# Patient Record
Sex: FEMALE | Race: WHITE | NOT HISPANIC OR LATINO | Employment: UNEMPLOYED | URBAN - METROPOLITAN AREA
[De-identification: names, ages, dates, MRNs, and addresses within clinical notes are randomized per-mention and may not be internally consistent; named-entity substitution may affect disease eponyms.]

---

## 2017-01-30 ENCOUNTER — GENERIC CONVERSION - ENCOUNTER (OUTPATIENT)
Dept: OTHER | Facility: OTHER | Age: 52
End: 2017-01-30

## 2017-02-21 ENCOUNTER — GENERIC CONVERSION - ENCOUNTER (OUTPATIENT)
Dept: OTHER | Facility: OTHER | Age: 52
End: 2017-02-21

## 2017-05-03 ENCOUNTER — HOSPITAL ENCOUNTER (EMERGENCY)
Facility: HOSPITAL | Age: 52
Discharge: LEFT BEFORE DISCHARGE | End: 2017-05-03
Payer: COMMERCIAL

## 2017-05-03 VITALS
HEART RATE: 99 BPM | OXYGEN SATURATION: 94 % | TEMPERATURE: 97.8 F | RESPIRATION RATE: 18 BRPM | DIASTOLIC BLOOD PRESSURE: 75 MMHG | WEIGHT: 218 LBS | SYSTOLIC BLOOD PRESSURE: 175 MMHG

## 2017-05-03 RX ORDER — MONTELUKAST SODIUM 10 MG/1
10 TABLET ORAL
COMMUNITY
End: 2018-12-13 | Stop reason: SDUPTHER

## 2017-05-03 RX ORDER — SULFASALAZINE 500 MG/1
1000 TABLET ORAL 2 TIMES DAILY
Status: ON HOLD | COMMUNITY
End: 2018-01-03

## 2017-05-03 RX ORDER — ACETAMINOPHEN 325 MG/1
650 TABLET ORAL ONCE
Status: COMPLETED | OUTPATIENT
Start: 2017-05-03 | End: 2017-05-03

## 2017-05-03 RX ORDER — PREDNISONE 10 MG/1
5 TABLET ORAL 2 TIMES DAILY
COMMUNITY
End: 2018-04-18

## 2017-05-03 RX ADMIN — ACETAMINOPHEN 650 MG: 325 TABLET, FILM COATED ORAL at 12:58

## 2017-05-05 ENCOUNTER — ALLSCRIPTS OFFICE VISIT (OUTPATIENT)
Dept: OTHER | Facility: OTHER | Age: 52
End: 2017-05-05

## 2017-05-05 DIAGNOSIS — Z12.31 ENCOUNTER FOR SCREENING MAMMOGRAM FOR MALIGNANT NEOPLASM OF BREAST: ICD-10-CM

## 2017-07-26 ENCOUNTER — ALLSCRIPTS OFFICE VISIT (OUTPATIENT)
Dept: OTHER | Facility: OTHER | Age: 52
End: 2017-07-26

## 2017-07-26 DIAGNOSIS — M06.4 INFLAMMATORY POLYARTHROPATHY (HCC): ICD-10-CM

## 2017-07-26 DIAGNOSIS — R63.5 ABNORMAL WEIGHT GAIN: ICD-10-CM

## 2017-10-12 ENCOUNTER — GENERIC CONVERSION - ENCOUNTER (OUTPATIENT)
Dept: OTHER | Facility: OTHER | Age: 52
End: 2017-10-12

## 2017-10-26 ENCOUNTER — TRANSCRIBE ORDERS (OUTPATIENT)
Dept: ADMINISTRATIVE | Facility: HOSPITAL | Age: 52
End: 2017-10-26

## 2017-10-26 DIAGNOSIS — N64.4 MASTODYNIA: ICD-10-CM

## 2017-10-26 DIAGNOSIS — R10.11 RIGHT UPPER QUADRANT PAIN: ICD-10-CM

## 2017-10-26 DIAGNOSIS — Z98.84 BARIATRIC SURGERY STATUS: Primary | ICD-10-CM

## 2017-10-26 DIAGNOSIS — Z98.84 BARIATRIC SURGERY STATUS: ICD-10-CM

## 2017-10-26 DIAGNOSIS — M89.8X1 OTHER SPECIFIED DISORDERS OF BONE, SHOULDER: ICD-10-CM

## 2017-10-30 ENCOUNTER — HOSPITAL ENCOUNTER (OUTPATIENT)
Dept: RADIOLOGY | Facility: HOSPITAL | Age: 52
Discharge: HOME | End: 2017-10-30
Attending: SURGERY
Payer: COMMERCIAL

## 2017-10-30 DIAGNOSIS — Z98.84 BARIATRIC SURGERY STATUS: ICD-10-CM

## 2017-10-30 PROCEDURE — 74240 X-RAY XM UPR GI TRC 1CNTRST: CPT

## 2017-11-15 ENCOUNTER — ALLSCRIPTS OFFICE VISIT (OUTPATIENT)
Dept: OTHER | Facility: OTHER | Age: 52
End: 2017-11-15

## 2017-11-16 NOTE — PROGRESS NOTES
Assessment    1  Right upper quadrant abdominal pain (789 01) (R10 11)   2  Mastalgia (611 71) (N64 4)   3  Acute bronchitis (466 0) (J20 9)   4  Clavicle pain (733 90) (M89 8X1)    Plan  Acute bronchitis    · Benzonatate 200 MG Oral Capsule; TAKE 1 CAPSULE 3 TIMES DAILY ASNEEDED  Acute bronchitis, Right upper quadrant abdominal pain    · Azithromycin 250 MG Oral Tablet; TAKE 2 TABLETS ON DAY 1 THEN TAKE 1TABLET A DAY FOR 4 DAYS   · Ipratropium-Albuterol 0 5-2 5 (3) MG/3ML Inhalation Solution; 1 unit dose qid prn  Clavicle pain    · XR CLAVICLE RIGHT; Status:Active; Requested for:04Nxe8772;   Encounter for colorectal cancer screening    · *1 - [facility]  had no showed for appt10/12 due to school issues with son-is homeschooling son now  Status: Active Requested for: 70WCS6090  Care Summary provided  : Yes  Mastalgia    · *US BREAST LEFT LIMITED (DIAGNOSTIC); Status:Hold For - Scheduling; Requestedfor:26Njs3088;    · *US BREAST RIGHT LIMITED (DIAGNOSTIC); Status:Hold For - Scheduling; Requestedfor:82Ucn4807;    · MAMMO DIAGNOSTIC BILATERAL W CAD; Status:Hold For - Scheduling; Requestedfor:92Gxx0546;   Right upper quadrant abdominal pain    · * US ABDOMEN COMPLETE; Status:Hold For - Scheduling; Requested for:09Wnz0898;     Discussion/Summary    Await xray ,ultrasound and mammo, see how pt does on meds  Possible side effects of new medications were reviewed with the patient/guardian today  The treatment plan was reviewed with the patient/guardian  The patient/guardian understands and agrees with the treatment plan      Chief Complaint  Pt here cough with phlegm, sinus pressure, headache, sore throat  Pt also collar bone right side pain  Pt states hurts to touch  Pt also states pain under breast, vomiting, heartburn and occasional diarrhea  Pt states gallbladder issue, pain and pressure        History of Present Illness  Cold Symptoms:   Phoebe Si presents with complaints of sudden onset of constant episodes of moderate cold symptoms  Her symptoms are caused by no known event Symptoms are worsening (using old nebulizer meds)  Associated symptoms include nasal congestion,-- runny nose,-- headache-- and-- wheezing  The patient presents with complaints of productive cough (yellow mucus)   Abdominal Pain:   [facility] presents with complaints of gradual onset of constant episodes of moderate right upper quadrant abdominal pain, described as aching  Symptoms are made worse by eating and drinking  Review of Systems   Constitutional: fever-- and-- feeling poorly, but-- no chills-- and-- not feeling tired  ENT: no earache-- and-- no sore throat  Cardiovascular: no chest pain  Respiratory: cough-- and-- wheezing  Breasts: breast pain  Gastrointestinal: abdominal pain  Musculoskeletal: limb pain-- and-- clavicle pain  Active Problems  1  Acute foot pain, right (729 5) (M79 671)   2  Acute pain of right knee (719 46) (M25 561)   3  Cough (786 2) (R05)   4  Degeneration of lumbar or lumbosacral intervertebral disc (722 52) (M51 37)   5  Depression (311) (F32 9)   6  Dermatitis fungal (111 9) (B36 9)   7  Edema (782 3) (R60 9)   8  Encounter for colorectal cancer screening (V76 51) (Z12 11,Z12 12)   9  Encounter for screening mammogram for breast cancer (V76 12) (Z12 31)   10  Flu vaccine need (V04 81) (Z23)   11  Foot injury (959 7) (S99 929A)   12  Hip pain (719 45) (M25 559)   13  Hypertension (401 9) (I10)   14  Knee pain (719 46) (M25 569)   15  Left foot pain (729 5) (M79 672)   16  Left knee pain (719 46) (M25 562)   17  Left shoulder pain (719 41) (M25 512)   18  Mild persistent asthma without complication (250 53) (C71 58)   19  Nausea Chronic (787 02)   20  Obesity (278 00) (E66 9)   21  Obesity surgery status (V45 86) (Z98 84)   22  Omphalitis (771 4) (P38 9)   23  Pain of left upper extremity (729 5) (M79 602)   24  Photosensitivity due to sun (661 50) (K96 8)   76  Post-concussion syndrome (310 2) (F07 81)   26  Primary localized osteoarthritis of left knee (715 16) (M17 12)   27  Seronegative erosive rheumatoid arthritis (714 89) (M06 4)   28  Sleep apnea (780 57)   29  Status post bariatric surgery (V45 86) (Z98 84)   30  Stuttering (315 35) (F80 81)   31  Toxic effect of tobacco (989 84,E980 9) (T65 291A)   32  Vomiting (787 03) (R11 10)   33  Weight gain (783 1) (R63 5)    Past Medical History  1  History of bronchitis (V12 69) (Z87 09)  Active Problems And Past Medical History Reviewed: The active problems and past medical history were reviewed and updated today  Family History  Mother    1  Family history of Congenital Heart Disease   2  Family history of Diabetes Mellitus (V18 0)   3  Family history of Stroke Syndrome (V17 1)  Father    4  Family history of Diabetes Mellitus (V18 0)   5  Family history of asthma (V17 5) (Z82 5)   6  Family history of Hypertension (V17 49)  Son    7  Family history of seizures (V19 8) (Z84 89)  Sister    6  Family history of asthma (V17 5) (Z82 5)  Brother    5  Family history of asthma (V17 5) (Z82 5)  Maternal Aunt    10  Family history of asthma (V17 5) (Z82 5)  Family History Reviewed: The family history was reviewed and updated today  Social History     · Advance directive information unavailable   · Current every day smoker (305 1) (F17 200)   · 1 ppd   · Domestic partner   · Denied: History of Drug Use   · Denied: History of domestic violence   · Housing Details: House   · Lives with domestic partner   · One child   · Stopped Drinking Alcohol   · Unemployed, looking for work  The social history was reviewed and updated today  Surgical History    1  History of Anal Fissurectomy   2  History of  Section   3  History of Laparosc Restrictive Proc Adjustable Gastric Band Placement  Surgical History Reviewed: The surgical history was reviewed and updated today  Current Meds   1   Acetaminophen-Codeine 300-60 MG Oral Tablet; 1 PO TID prn pain; Therapy: 19MDD6534 to (Evaluate:04Jun2017); Last YW:56RPI4526 Ordered   2  BuPROPion HCl ER (SR) 150 MG Oral Tablet Extended Release 12 Hour; TAKE 1 TABLET EVERY 12 HOURS DAILY; Therapy: 11YQN9752 to (Evaluate:28Apr2018)  Requested for: 96Fvm7089; Last Rx:61Vzy0319 Ordered   3  Fluticasone Propionate 50 MCG/ACT Nasal Suspension; USE 1 SPRAY IN EACH NOSTRIL ONCE DAILY; Therapy: 60Yhh6969 to (Evaluate:15May2016)  Requested for: 15Apr2016; Last Rx:15Apr2016 Ordered   4  Levalbuterol HCl - 1 25 MG/3ML Inhalation Nebulization Solution; USE 1 UNIT DOSE IN NEBULIZER EVERY 4 TO 6 HOURS AS NEEDED  Requested for: 57AMU3903; Last Rx:01Nov2016 Ordered   5  Losartan Potassium-HCTZ 100-25 MG Oral Tablet; Take 1 tablet daily; Therapy: 61AGJ9721 to (Evaluate:22Nov2017)  Requested for: 97INL8491; Last Rx:76Pft6502 Ordered   6  Montelukast Sodium 10 MG Oral Tablet; take 1 tablet by mouth once daily  Requested for: 04NBH4320; Last Rx:67Ced7575 Ordered   7  Oxaprozin 600 MG Oral Tablet; take 2 tablets daily; Therapy: 57DGU4340 to (Evaluate:31Gsr9722)  Requested for: 23ELI9394; Last Rx:20Oct2017 Ordered   8  Spiriva Respimat 1 25 MCG/ACT Inhalation Aerosol Solution; INHALE 2 INHALATIONS BY MOUTH DAILY; Therapy: 53HZV4862 to (Evaluate:22Jan2018)  Requested for: 54Iwf1619; Last Rx:58Rao4458 Ordered   9  Spironolactone 25 MG Oral Tablet; 1 po day prn edema; Therapy: 70RBR3154 to (Evaluate:22Jan2018)  Requested for: 38Vrq4479; Last Rx:98Sow6691 Ordered   10  Symbicort 160-4 5 MCG/ACT Inhalation Aerosol; INHALE 2 PUFFS TWICE DAILY  RINSE MOUTH AFTER USE; Therapy: 20Vte2757 to (Evaluate:15Oct2016)  Requested for: 69Adm1793; Last  Rx:00Xry2719 Ordered   11  Ventolin  (90 Base) MCG/ACT Inhalation Aerosol Solution; INHALE 2 PUFFS  FOUR TIMES DAILY AS DIRECTED;   Therapy: 02Sep2016 to (Tu Gastelum)  Requested for: 36LDA8721; Last  Rx:46Epd1296 Ordered    The medication list was reviewed and updated today  Allergies  1  No Known Drug Allergies    Vitals   Recorded: 45BIF1077 02:36PM   Temperature 98 8 F, Temporal   Heart Rate 92   Respiration 18   Systolic 621   Diastolic 928   Height 5 ft    Weight 214 lb 8 oz   BMI Calculated 41 89   BSA Calculated 1 92   Pain Scale 10       Physical Exam   Constitutional  General appearance: Abnormal   acutely ill,-- uncomfortable-- and-- obese  Results/Data  PHQ-9 Adult Depression Screening 61SGJ9409 02:41PM User, Ahs     Test Name Result Flag Reference   PHQ-9 Adult Depression Score 19       Over the last two weeks, how often have you been bothered by any of the following problems? Little interest or pleasure in doing things: Nearly every day - 3 Feeling down, depressed, or hopeless: More than half the days - 2 Trouble falling or staying asleep, or sleeping too much: Nearly every day - 3 Feeling tired or having little energy: Nearly every day - 3 Poor appetite or over eating: Nearly every day - 3 Feeling bad about yourself - or that you are a failure or have let yourself or your family down: Nearly every day - 3 Trouble concentrating on things, such as reading the newspaper or watching television: More than half the days - 2 Moving or speaking so slowly that other people could have noticed  Or the opposite -  being so fidgety or restless that you have been moving around a lot more than usual: Not at all - 0 Thoughts that you would be better off dead, or of hurting yourself in some way: Not at all - 0   PHQ-9 Adult Depression Screening Positive     PHQ-9 Difficulty Level Very difficult     PHQ-9 Severity      Moderately Severe Depression       Future Appointments    Date/Time Provider Specialty Site   12/21/2017 10:30 AM CHERELLE Morrissey  General Surgery [facility]       Signatures   Electronically signed by :  Ambar Robertson MD; Nov 15 2017  3:08PM EST                       (Author)

## 2017-11-17 ENCOUNTER — HOSPITAL ENCOUNTER (OUTPATIENT)
Dept: RADIOLOGY | Age: 52
Discharge: HOME | End: 2017-11-17
Payer: COMMERCIAL

## 2017-11-17 DIAGNOSIS — R10.11 RIGHT UPPER QUADRANT PAIN: ICD-10-CM

## 2017-11-17 PROCEDURE — 76700 US EXAM ABDOM COMPLETE: CPT

## 2017-11-20 ENCOUNTER — HOSPITAL ENCOUNTER (OUTPATIENT)
Dept: ULTRASOUND IMAGING | Facility: CLINIC | Age: 52
Discharge: HOME | End: 2017-11-20
Payer: COMMERCIAL

## 2017-11-20 ENCOUNTER — HOSPITAL ENCOUNTER (OUTPATIENT)
Dept: MAMMOGRAPHY | Facility: CLINIC | Age: 52
Discharge: HOME | End: 2017-11-20
Payer: COMMERCIAL

## 2017-11-20 DIAGNOSIS — N64.4 MASTODYNIA: ICD-10-CM

## 2017-11-20 DIAGNOSIS — Z12.39 ENCOUNTER FOR SCREENING FOR MALIGNANT NEOPLASM OF BREAST: ICD-10-CM

## 2017-11-20 PROCEDURE — 77063 BREAST TOMOSYNTHESIS BI: CPT

## 2017-11-20 PROCEDURE — G0202 SCR MAMMO BI INCL CAD: HCPCS

## 2017-11-22 ENCOUNTER — GENERIC CONVERSION - ENCOUNTER (OUTPATIENT)
Dept: OTHER | Facility: OTHER | Age: 52
End: 2017-11-22

## 2017-12-21 ENCOUNTER — GENERIC CONVERSION - ENCOUNTER (OUTPATIENT)
Dept: OTHER | Facility: OTHER | Age: 52
End: 2017-12-21

## 2017-12-28 RX ORDER — ALBUTEROL SULFATE 90 UG/1
2 AEROSOL, METERED RESPIRATORY (INHALATION) 4 TIMES DAILY
Status: ON HOLD | COMMUNITY
End: 2018-01-03 | Stop reason: ALTCHOICE

## 2017-12-28 RX ORDER — ACETAMINOPHEN AND CODEINE PHOSPHATE 300; 30 MG/1; MG/1
1 TABLET ORAL EVERY 4 HOURS PRN
Status: ON HOLD | COMMUNITY
End: 2018-01-03 | Stop reason: ALTCHOICE

## 2017-12-28 RX ORDER — FLUTICASONE PROPIONATE 50 MCG
1 SPRAY, SUSPENSION (ML) NASAL AS NEEDED
COMMUNITY

## 2017-12-28 RX ORDER — BUDESONIDE AND FORMOTEROL FUMARATE DIHYDRATE 160; 4.5 UG/1; UG/1
2 AEROSOL RESPIRATORY (INHALATION) 2 TIMES DAILY
COMMUNITY

## 2017-12-28 RX ORDER — BENZONATATE 200 MG/1
200 CAPSULE ORAL 3 TIMES DAILY PRN
Status: ON HOLD | COMMUNITY
End: 2018-01-03 | Stop reason: ALTCHOICE

## 2017-12-28 RX ORDER — SPIRONOLACTONE 25 MG/1
25 TABLET ORAL AS NEEDED
COMMUNITY
End: 2022-03-14 | Stop reason: ALTCHOICE

## 2017-12-28 RX ORDER — ALBUTEROL SULFATE 2.5 MG/3ML
2.5 SOLUTION RESPIRATORY (INHALATION) EVERY 6 HOURS PRN
COMMUNITY
End: 2018-10-29 | Stop reason: SDUPTHER

## 2018-01-02 ENCOUNTER — ANESTHESIA EVENT (OUTPATIENT)
Dept: GASTROENTEROLOGY | Facility: HOSPITAL | Age: 53
End: 2018-01-02
Payer: COMMERCIAL

## 2018-01-03 ENCOUNTER — ANESTHESIA (OUTPATIENT)
Dept: GASTROENTEROLOGY | Facility: HOSPITAL | Age: 53
End: 2018-01-03
Payer: COMMERCIAL

## 2018-01-03 ENCOUNTER — HOSPITAL ENCOUNTER (OUTPATIENT)
Facility: HOSPITAL | Age: 53
Setting detail: OUTPATIENT SURGERY
Discharge: HOME | End: 2018-01-03
Attending: SURGERY | Admitting: SURGERY
Payer: COMMERCIAL

## 2018-01-03 VITALS
SYSTOLIC BLOOD PRESSURE: 145 MMHG | WEIGHT: 211 LBS | HEIGHT: 61 IN | BODY MASS INDEX: 39.84 KG/M2 | TEMPERATURE: 97 F | DIASTOLIC BLOOD PRESSURE: 72 MMHG | RESPIRATION RATE: 16 BRPM | OXYGEN SATURATION: 98 % | HEART RATE: 68 BPM

## 2018-01-03 DIAGNOSIS — Z98.84 BARIATRIC SURGERY STATUS: ICD-10-CM

## 2018-01-03 PROCEDURE — 88342 IMHCHEM/IMCYTCHM 1ST ANTB: CPT | Performed by: SURGERY

## 2018-01-03 PROCEDURE — 88305 TISSUE EXAM BY PATHOLOGIST: CPT | Performed by: SURGERY

## 2018-01-03 RX ORDER — SODIUM CHLORIDE 9 MG/ML
125 INJECTION, SOLUTION INTRAVENOUS CONTINUOUS
Status: DISCONTINUED | OUTPATIENT
Start: 2018-01-03 | End: 2018-01-03 | Stop reason: HOSPADM

## 2018-01-03 RX ORDER — PROPOFOL 10 MG/ML
INJECTION, EMULSION INTRAVENOUS AS NEEDED
Status: DISCONTINUED | OUTPATIENT
Start: 2018-01-03 | End: 2018-01-03 | Stop reason: SURG

## 2018-01-03 RX ADMIN — SODIUM CHLORIDE 125 ML/HR: 0.9 INJECTION, SOLUTION INTRAVENOUS at 06:46

## 2018-01-03 RX ADMIN — PROPOFOL 50 MG: 10 INJECTION, EMULSION INTRAVENOUS at 07:53

## 2018-01-03 RX ADMIN — PROPOFOL 150 MG: 10 INJECTION, EMULSION INTRAVENOUS at 07:51

## 2018-01-03 NOTE — OP NOTE
OPERATIVE REPORT  PATIENT NAME: Alecia Alarcon    :  1965  MRN: [id number]  Pt Location: [facility] ROOM 01    SURGERY DATE: 1/3/2018    Surgeon(s) and Role:     * Prosper Jerez MD - Primary     * Silviano Olsen MD - Assistant    Preop Diagnosis:  Bariatric surgery status [Z98 84]    Post-Op Diagnosis Codes: * Bariatric surgery status [Z98 84]    Procedure(s) (LRB):  ESOPHAGOGASTRODUODENOSCOPY (EGD) with biopsy (N/A)    Specimen(s):  ID Type Source Tests Collected by Time Destination   1 : Gastric biopsy r/o h pylori Tissue Stomach TISSUE EXAM Prosper Jerez MD 1/3/2018 8153        Estimated Blood Loss:   Minimal    Drains:       Anesthesia Type:   IV Sedation with Anesthesia    Operative Indications:  Bariatric surgery status [Z98 84]      Operative Findings:  Hiatal hernia  Esophageal hiatus LA grade A  No band erosion     Complications:   None    Procedure and Technique:  Upper endoscopy and biopsy   I was present for the entire procedure    Patient Disposition:  hemodynamically stable             Indication:   Patient suffers from morbid obesity and associated co-morbidities  and failed to achieve any meaningful or sustainable weight loss  Patient presented for a bariatric procedure and was consented for a preoperative upper endoscopy and biopsy to rule out H  Pylori  Description of the procedure: The patient was brought to the endoscopy suite and was placed in  left lateral decubitus position  A time-out was called and the patient was identified by name and by armband  The patient received IV  Propofol under closed monitoring  by the anesthesiologist and nurse anesthesist     Upper endoscopy was performed under direct visualization     Esophagus was visualized and showed normal findings   The GE junction showed a hiatal hernia   The stomach was then inspected and showed normal findings    First and second portion of duodenum were inspected and appeared to be normal  Biopsy was taken with a punch biopsy forceps from the antrum and sent to pathology to rule out H  Pylori  Retroflex view of the GE junction was obtained and showed no evidence of band erosion      The patient tolerated the procedure well and was transferred to the recovery unit in stable condition           I    SIGNATURE: Francis Webb MD  DATE: January 3, 2018  TIME: 7:56 AM

## 2018-01-03 NOTE — ANESTHESIA PREPROCEDURE EVALUATION
Review of Systems/Medical History  Patient summary reviewed  Chart reviewed  No history of anesthetic complications     Cardiovascular  Hypertension controlled,    Pulmonary  Asthma: well controlled/ stable Last rescue: today Asthma type of rescue: PRN inhaler, Sleep apnea CPAP, ,        GI/Hepatic    Bariatric surgery,             Endo/Other  Arthritis     GYN  Negative gynecology ROS          Hematology  Negative hematology ROS      Musculoskeletal  Obesity  morbid obesity, Rheumatoid arthritis ,        Neurology  Negative neurology ROS      Psychology   Anxiety, Depression , being treated for depression,            Physical Exam    Airway    Mallampati score: II  TM Distance: >3 FB  Neck ROM: full     Dental   No notable dental hx     Cardiovascular  Rhythm: regular, Rate: normal, Cardiovascular exam normal    Pulmonary  Pulmonary exam normal Breath sounds clear to auscultation,     Other Findings        Anesthesia Plan  ASA Score- 3     Anesthesia Type- IV sedation with anesthesia with ASA Monitors  Additional Monitors:   Airway Plan:         Plan Factors-Patient not instructed to abstain from smoking on day of procedure  Patient did not smoke on day of surgery  Induction- intravenous  Postoperative Plan-     Informed Consent- Anesthetic plan and risks discussed with patient  I personally reviewed this patient with the CRNA  Discussed and agreed on the Anesthesia Plan with the CRNA  Brady Zarco

## 2018-01-03 NOTE — H&P
H&P EXAM - Outpatient Endoscopy  [address] GI LAB INTRA   Shereen Govea 47 y o  female MRN: [id number]  Unit/Bed#: ENDO POOL Encounter: [id number]        Impression: Morbid obesity s/p band placement    Plan:Upper endoscopy and a biopsy to rule out H  Pylori    Chief Complaint: Morbid obesity and preoperative endoscopy    Physical Exam: Normal not in acute distress   Chest: Clear to auscultation   Heart: Normal S1 and S2

## 2018-01-03 NOTE — DISCHARGE INSTRUCTIONS
Upper Endoscopy   WHAT YOU NEED TO KNOW:   An upper endoscopy is also called an upper gastrointestinal (GI) endoscopy, or an esophagogastroduodenoscopy (EGD)  You may feel bloated, gassy, or have some abdominal discomfort after your procedure  Your throat may be sore for 24 to 36 hours  You may burp or pass gas from air that is still inside your body  DISCHARGE INSTRUCTIONS:   Call 911 for any of the following:   · You have sudden chest pain or trouble breathing  Seek care immediately if:   · You feel dizzy or faint  · You have trouble swallowing  · Your bowel movements are very dark or black  · Your abdomen is hard and firm and you have severe pain  · You vomit blood  Contact your healthcare provider if:   · You feel full or bloated and cannot burp or pass gas  · You have not had a bowel movement for 3 days after your procedure  · You have neck pain  · You have a fever or chills  · You have nausea or are vomiting  · You have a rash or hives  · You have questions or concerns about your endoscopy  Relieve a sore throat:  Suck on throat lozenges or crushed ice  Gargle with a small amount of warm salt water  Mix 1 teaspoon of salt and 1 cup of warm water to make salt water  Relieve gas and discomfort from bloating:  Lie on your right side with a heating pad on your abdomen  Take short walks to help pass gas  Eat small meals until bloating is relieved  Rest after your procedure: You have been given medicine to relax you  Do not  drive or make important decisions until the day after your procedure  Return to your normal activity as directed  You can usually return to work the day after your procedure  Follow up with your healthcare provider as directed:  Write down your questions so you remember to ask them during your visits     © 2017 [address] is for End User's use only and may not be sold, redistributed or otherwise used for commercial purposes  All illustrations and images included in CareNotes® are the copyrighted property of [email or web address]  or Azeem Dutton  The above information is an  only  It is not intended as medical advice for individual conditions or treatments  Talk to your doctor, nurse or pharmacist before following any medical regimen to see if it is safe and effective for you  Esophagitis   WHAT YOU NEED TO KNOW:   What is esophagitis? Esophagitis is inflammation or irritation of the lining of the esophagus  What causes esophagitis? The most common cause is acid reflux  This means stomach acid backs up into your esophagus  The following can also cause esophagitis:  · An infection from bacteria, a virus, or a fungus    · Vomiting or a hiatal hernia    · Medicines such as aspirin or NSAIDs    · Large pills taken without enough water or right before you go to bed    · Cancer treatment, such as radiation    · A toxic object you swallowed, such as a button battery, that gets stuck in your esophagus    · Too much caffeine or acidic or spicy foods    · Cigarette smoking  What are the signs and symptoms of esophagitis? Signs and symptoms depend on the cause of your esophagitis  You may have any of the following:  · Pain in the middle of your chest that may spread to your back    · Burning or pain in your esophagus, abdominal pain, or indigestion    · Trouble swallowing, or pain when you swallow    · A feeling that something is stuck in your esophagus    · Sore throat, a cough, or hoarseness    · Gagging, drooling, or wheezing    · Mouth sores (white patches), or a bad taste in your mouth or bad breath    · Nausea or vomiting    · Feeding problems or failure to thrive (young children)  How is esophagitis diagnosed? Your healthcare provider will ask about your symptoms and when they started  Tell him if anything makes your symptoms worse or better   You may need any of the following:  · An endoscopy  is a procedure used to look at your esophagus and stomach  Your healthcare provider will use an endoscope (tube with a camera and light on the end)  He may also take a tissue sample during the procedure  The sample may show if your esophagus was damaged by what is causing your esophagitis  · A barium swallow  is done to show if your esophagus was damaged and how badly it was damaged  X-rays are taken after you swallow barium liquid  Barium liquid is used to help damage show up on the x-ray  How is esophagitis treated? The goal of treatment is to help the lining of your esophagus heal and to prevent serious complications  Treatment will depend on what is causing your esophagitis  Symptoms caused by a toxic object such as a button battery need immediate treatment  Less severe causes may not need treatment  You may need any of the following if symptoms continue or get worse:  · Medicines  may be given to fight infection or to control stomach acid  Your healthcare provider may make changes to your medicines, such as changing it to a liquid form  · An elimination diet  may help you find foods that are causing your symptoms  You will stop eating certain foods that can cause esophagitis  Your healthcare provider will tell you to start eating them again one at a time  Each time you do not have symptoms, you will start eating another food from the list  Any food that does cause symptoms may be causing your esophagitis  · Surgery  may be needed if other treatments do not work  Part of your stomach can be wrapped to cover the valve between your stomach and esophagus  This helps prevent acid from backing up into your esophagus  What can I do to manage or prevent esophagitis? · Do not smoke  Nicotine and other chemicals in cigarettes and cigars can cause blood vessel and lung damage  Ask your healthcare provider for information if you currently smoke and need help to quit   E-cigarettes or smokeless tobacco still contain nicotine  Talk to your healthcare provider before you use these products  · Do not drink alcohol  Alcohol can irritate your esophagus  Talk to your healthcare provider if you need help to stop drinking  · Limit or do not eat foods that can lead to esophagitis  Foods such as oranges and salsa can irritate your esophagus  Caffeine and chocolate can cause acid reflux  High-fat and fried foods make your stomach digest food more slowly  This increases the amount of stomach acid your esophagus is exposed to  Eat small meals, and drink water with your meals  Soft foods such as yogurt and applesauce may help soothe your throat  Do not eat for at least 3 hours before you go to bed  · Keep batteries and similar objects out of the reach of children  Babies often put items in their mouths to explore them  Button batteries are easy to swallow and can cause serious damage  Keep the battery covers of electronic devices such as remote controls taped closed  Store all batteries and toxic materials where children cannot get to them  Use childproof locks to keep children away from dangerous materials  · Drink more liquid when you take pills  Drink a full glass of water when you take your pills  Ask your healthcare provider if you can take your pills at least an hour before you go to bed  · Prevent acid reflux  Do not bend over unless it is necessary  Acid may back up into your esophagus when you bend over  If possible, keep the head of your bed elevated while you sleep  This will help keep acid from backing up  Manage stress  Stress can make your symptoms worse or cause stomach acid to back up  Call 911 for any of the following:   · You have chest pain that does not go away within a few minutes or gets worse  When should I seek immediate care? · You feel like you have food stuck in your throat and you cannot cough it out  When should I contact my healthcare provider?    · You have new or worsening symptoms, even after treatment  · You have questions or concerns about your condition or care  CARE AGREEMENT:   You have the right to help plan your care  Learn about your health condition and how it may be treated  Discuss treatment options with your caregivers to decide what care you want to receive  You always have the right to refuse treatment  The above information is an  only  It is not intended as medical advice for individual conditions or treatments  Talk to your doctor, nurse or pharmacist before following any medical regimen to see if it is safe and effective for you  © 2017 [address] [facility] Information is for End User's use only and may not be sold, redistributed or otherwise used for commercial purposes  All illustrations and images included in CareNotes® are the copyrighted property of [employer]  or Azeem Dutton  Hiatal Hernia   WHAT YOU NEED TO KNOW:   What is a hiatal hernia? A hiatal hernia is a condition that causes part of your stomach to bulge through the hiatus (small opening) in your diaphragm  The part of the stomach may move up and down, or it may get trapped above the diaphragm  What increases my risk for a hiatal hernia? The exact cause of a hiatal hernia is not known  You may have been born with a large hiatus  The following may increase your risk of a hiatal hernia:  · Obesity    · Older age    · Medical conditions such as diverticulosis or esophagitis    · Previous surgery of the esophagus or stomach or trauma such as from a motor vehicle accident  What are the types of hiatal hernia? · Type I (sliding hiatal hernia): A portion of the stomach slides in and out of the hiatus  This type is the most common and usually causes gastroesophageal reflux disease (GERD)  GERD occurs when the esophageal sphincter does not close properly and causes acid reflux  The esophageal sphincter is the lower muscle of the esophagus       · Type II (paraesophageal hiatal hernia):  Type II hiatal hernia forms when a part of the stomach squeezes through the hiatus and lies next to the esophagus  · Type III (combined):  Type III hiatal hernia is a combination of a sliding and a paraesophageal hiatal hernia  · Type IV (complex paraesophageal hiatal hernia): The whole stomach, the small and large bowels, spleen, pancreas, or liver is pushed up into the chest   What are the signs and symptoms of a hiatal hernia? The most common symptom is heartburn  This usually occurs after meals and spreads to your neck, jaw, or shoulder  You may have no signs or symptoms, or you may have any of the following:  · Abdominal pain, especially in the area just above your navel    · Bitter or acid taste in your mouth    · Trouble swallowing    · Coughing or hoarseness    · Chest pain or shortness of breath that occurs after eating    · Frequent burping or hiccups    · Uncomfortable feeling of fullness after eating  How is a hiatal hernia diagnosed? · An upper GI series test  includes x-rays of your esophagus, stomach, and your small intestines  It is also called a barium swallow test  You will be given barium (a chalky liquid) to drink before the pictures are taken  This liquid helps your stomach and intestines show up better on the x-rays  An upper GI series can show if you have an ulcer, a blocked intestine, or other problems  · An endoscopy  uses a scope to see the inside of your digestive tract  A scope is a long, bendable tube with a light on the end of it  A camera may be hooked to the scope to take pictures  How is a hiatal hernia treated? Treatment depends on the type of hiatal hernia you have and on your symptoms  You may not need any treatment  You may need any of the following:  · Medicines  may be given to relieve heartburn symptoms  These medicines help to decrease or block stomach acid   You may also be given medicines that help to tighten the esophageal sphincter  · Surgery  may be done when medicines cannot control your symptoms, or other problems are present  Your healthcare provider may also suggest surgery depending on the type of hernia you have  Your healthcare provider can put your stomach back into its normal location  He may make the hiatus (hole) smaller and anchor your stomach in your abdomen  Fundoplication is a surgery that wraps the upper part of the stomach around the esophageal sphincter to strengthen it  How can I manage symptoms? The following nutrition and lifestyle changes may be recommended to relieve symptoms of heartburn  · Avoid foods that make your symptoms worse  These may include spicy foods, fruit juices, alcohol, caffeine, chocolate, and mint  · Eat several small meals during the day  Small meals give your stomach less food to digest     · Avoid lying down and bending forward after you eat  Do not eat meals 2 to 3 hours before bedtime  This decreases your risk for reflux  · Maintain a healthy weight  If you are overweight, weight loss may help relieve your symptoms  · Sleep with your head elevated  at least 6 inches  · Do not smoke  Smoking can increase your symptoms of heartburn  When should I seek immediate care? · You have severe abdominal pain  · You try to vomit but nothing comes out (retching)  · You have severe chest pain and sudden trouble breathing  · Your bowel movements are black or bloody  · Your vomit looks like coffee grounds or has blood in it  When should I contact my healthcare provider? · Your symptoms are getting worse  · You have nausea, and you are vomiting  · You are losing weight without trying  · You have questions or concerns about your condition or care  CARE AGREEMENT:   You have the right to help plan your care  Learn about your health condition and how it may be treated   Discuss treatment options with your caregivers to decide what care you want to receive  You always have the right to refuse treatment  The above information is an  only  It is not intended as medical advice for individual conditions or treatments  Talk to your doctor, nurse or pharmacist before following any medical regimen to see if it is safe and effective for you  © 2017 [address] Information is for End User's use only and may not be sold, redistributed or otherwise used for commercial purposes  All illustrations and images included in CareNotes® are the copyrighted property of [employer]  or Azeem Dutton

## 2018-01-11 NOTE — RESULT NOTES
Verified Results  (1) URINE CULTURE [id number] 05:15PM Princess Marie     Test Name Result Flag Reference   CLINICAL REPORT (Report)     Test:        Urine culture  Specimen Type:   Urine  Specimen Date:   6/22/2016 5:15 PM  Result Date:    6/24/2016 8:31 AM  Result Status:   Final result  Resulting Lab:   [facility] [address] [facility] [address]            Tel: [phone]      CULTURE                                       ------------------                                   >100,000 cfu/ml Escherichia coli      SUSCEPTIBILITY                                   ------------------                                                       Escherichia coli  METHOD                 SORAYA  -------------------------------------  --------------------------  AMPICILLIN ($$)             >16 00 ug/ml Resistant  AMPICILLIN + SULBACTAM ($)       16/8 ug/ml  Intermediate  AZTREONAM ($$$)             <=8 ug/ml   Susceptible  CEFAZOLIN ($)              <=8 00 ug/ml Susceptible  CIPROFLOXACIN ($)            <=1 00 ug/ml Susceptible  GENTAMICIN ($$)             <=4 ug/ml   Susceptible  LEVOFLOXACIN ($)            <=2 00 ug/ml Susceptible  NITROFURANTOIN             <=32 ug/ml  Susceptible  PIPERACILLIN + TAZOBACTAM ($$$)     <=16 ug/ml  Susceptible  TETRACYCLINE              <=4 ug/ml   Susceptible  TOBRAMYCIN ($)             <=4 ug/ml   Susceptible  TRIMETHOPRIM + SULFAMETHOXAZOLE ($$$)  <=2/38 ug/ml Susceptible

## 2018-01-11 NOTE — MISCELLANEOUS
Message   Recorded as Task   Date: 2016 10:48 AM, Created By: Juan Webster   Task Name: Intake   Assigned To: [facility],Team   Regarding Patient: Tosha Newman, Status: In Progress   Brooks Pulliam - 30 Dec 2016 10:48 AM     TASK CREATED  Please review patient has [facility]     Date: 16    Patient Name: Claudine Zurita   : 1965  Address: [address]  Home Phone: [phone]  Cell Phone: [  ]    Insurance: James Schmitt   Referral Required? No     PCP: Vinita Grijalva   Phone: [phone]    Date of Injury: [  ]  Employer: [  ]  Co: [  ]  Claim #: [  ]  Address: [  ]  Contact Name: [  ]  Phone #: [  ]    Reviewed: [  ]    Ref Phys: Vinita Grijalva   Phone #: [phone]    Reason for Appointment: DEGENERATION OF LUMBAR LUMBOSACRAL INTERVERTEBRAL [address]   Studies: Yes  XRAY   Where: [facility]   When: 16    Have you seen a pain specialist in the past?  No  (needed records) Who, Where and When? [   ]    Notes: [  ]  Appointment: Date [  ] Time: [  ]      Dr Sanjiv Soto:    Printed Forms: date [  ]  Reviewed by: [  ]   Alex Quiroz - 2017 4:49 PM     TASK REPLIED TO: Previously Assigned To Alex Quiroz                      ok to schedule consult with me   Alicia Keane - 2017 10:30 AM     TASK COMPLETED   Alicia Keane - 2017 10:31 AM     TASK REACTIVATED   Alicia Keane - 2017 10:31 AM     TASK REASSIGNED: Previously Assigned To Eulalia Jaimes - 2017 11:01 AM     TASK IN PROGRESS   Ely Haq - 2017 11:03 AM     TASK REPLIED TO: Previously Assigned To [facility],Team  Lmom for pt to Юлия Mcknight - 2017 12:23 PM     TASK EDITED  Pt is scheduled for 17 at 10:30        Active Problems    1  Acute foot pain, right (729 5) (M79 671)   2  Acute pain of right knee (719 46) (M25 561)   3   Cough (786 2) (R05) 4  Degeneration of lumbar or lumbosacral intervertebral disc (722 52) (M51 37)   5  Depression (311) (F32 9)   6  Encounter for colorectal cancer screening (V76 51) (Z12 11,Z12 12)   7  Encounter for screening mammogram for breast cancer (V76 12) (Z12 31)   8  Flu vaccine need (V04 81) (Z23)   9  Foot injury (959 7) (S99 929A)   10  Hip pain (719 45) (M25 559)   11  Hypertension (401 9) (I10)   12  Knee pain (719 46) (M25 569)   13  Left foot pain (729 5) (M79 672)   14  Left knee pain (719 46) (M25 562)   15  Mild persistent asthma without complication (374 75) (Y56 04)   16  Nausea Chronic (787 02)   17  Obesity (278 00) (E66 9)   18  Obesity surgery status (V45 86) (Z98 84)   19  Omphalitis (771 4) (P38 9)   20  Post-concussion syndrome (310 2) (F07 81)   21  Primary localized osteoarthritis of left knee (715 16) (M17 12)   22  Sleep apnea (780 57)   23  Stuttering (315 35) (F80 81)   24  Toxic effect of tobacco (989 84,E980 9) (T65 291A)   25  Vomiting (787 03) (R11 10)   26  Weight gain (783 1) (R63 5)    Current Meds   1  ALPRAZolam 1 MG Oral Tablet; 1 tab  1hr prior to procedure,my repeat immediately   prior if needed; Therapy: 05TEI1963 to (Evaluate:80Yuc2478); Last Rx:64Kxw6717 Ordered   2  BuPROPion HCl ER (SR) 150 MG Oral Tablet Extended Release 12 Hour; TAKE 1   TABLET EVERY 12 HOURS DAILY; Therapy: 57OLD9063 to (Evaluate:94Pki4658)  Requested for: 29Rlo6050; Last   Rx:41Etw7142 Ordered   3  Doxycycline Hyclate 100 MG Oral Capsule; TAKE 1 CAPSULE TWICE DAILY UNTIL   GONE;   Therapy: 42BVK8855 to (Evaluate:08Jan2017)  Requested for: 00EVG7416; Last   Rx:90Xry5506 Ordered   4  Fluticasone Propionate 50 MCG/ACT Nasal Suspension (Flonase); USE 1 SPRAY IN   EACH NOSTRIL ONCE DAILY; Therapy: 15Apr2016 to (Evaluate:15May2016)  Requested for: 15Apr2016; Last   Rx:15Apr2016 Ordered   5  Indomethacin 25 MG Oral Capsule; TAKE 1 CAPSULE 3 TIMES DAILY WITH FOOD;    Therapy: 53KHL0187 to (Evaluate:76Zqf0419) Requested for: 53JEC9289; Last   Rx:55Opm2735 Ordered   6  Indomethacin ER 75 MG Oral Capsule Extended Release; TAKE 1 CAPSULE DAILY   WITH A MEAL; Therapy: 10LNX6499 to (Evaluate:04Plo4232)  Requested for: 39GHM6235; Last   Rx:29Nov2016 Ordered   7  Levalbuterol HCl - 1 25 MG/3ML Inhalation Nebulization Solution (Xopenex); USE 1   UNIT DOSE IN NEBULIZER EVERY 4 TO 6 HOURS AS NEEDED  Requested   for: 34FMH0332; Last Rx:01Nov2016 Ordered   8  Meloxicam 15 MG Oral Tablet; take 1 tablet by mouth every day; Therapy: 26Rgn7163 to (Evaluate:24Amm6538)  Requested for: 16Agn2939; Last   Rx:28Dec2016 Ordered   9  Montelukast Sodium 10 MG Oral Tablet (Singulair); take 1 tablet by mouth once daily    Requested for: 64AMZ4160; Last Rx:53Vaj4534 Ordered   10  Oxaprozin 600 MG Oral Tablet; TAKE 2 TABLETS DAILY; Therapy: 48LKA6604 to (Reg Hopkins)  Requested for: 97FHK7313; Last    Rx:06Jan2017 Ordered   11  PredniSONE 10 MG Oral Tablet; 5/d for 3 days, 4/d for 3 days, 3d for 3 days, 2/d for 3    days, 1/d for 3 day; Therapy: 79Rny7474 to (Evaluate:06Jan2017)  Requested for: 64CSH4629; Last    Rx:23Tfo8596; Status: ACTIVE - Renewal Denied Ordered   12  Proventil  (90 Base) MCG/ACT Inhalation Aerosol Solution; 2 PUFFS INH PRN; Therapy: 06LNS1337 to (Evaluate:05Ryy7186)  Requested for: 11BJJ2914; Last    Rx:82Hht5218 Ordered   13  Symbicort 160-4 5 MCG/ACT Inhalation Aerosol; INHALE 2 PUFFS TWICE DAILY  RINSE MOUTH AFTER USE; Therapy: 67Zai2790 to (Evaluate:15Oct2016)  Requested for: 89Odc8566; Last    Rx:81Srk0067 Ordered   14  TraMADol HCl - 50 MG Oral Tablet; TAKE 1 TABLET BY MOUTH 2 TO  3 TIMES A DAY AS    NEEDED FOR PAIN;    Therapy: 99Pkf7689 to (Evaluate:76Aid9576); Last Rx:12Oct2016 Ordered   15  Ventolin  (90 Base) MCG/ACT Inhalation Aerosol Solution; INHALE 2 PUFFS BY    MOUTH AS NEEDED;     Therapy: 02Sep2016 to (Evaluate:25Mar2017)  Requested for: 28AKD6998; Last    Rx:26Sep2016 Ordered   16  Zolpidem Tartrate 5 MG Oral Tablet; Therapy: 68Gmf2172 to (Evaluate:19Oct2014) Recorded    Allergies    1  No Known Drug Allergies    Signatures   Electronically signed by :  Jovi Neff, ; Jan 30 2017 12:23PM EST                       (Author)

## 2018-01-11 NOTE — RESULT NOTES
Verified Results  [address] [facility] [address] & CAD [id number] 10:03AM Abigail Patino Order Number: [id number]    - Patient Instructions: To schedule this appointment, please contact Central Scheduling at [phone]  Do not wear any perfume, powder, lotion or deodorant on breast or underarm area  Please bring your doctors order, referral (if needed) and insurance information with you on the day of the test  Failure to bring this information may result in this test being rescheduled  Arrive 15 minutes prior to your appointment time to register  On the day of your test, please bring any prior mammogram or breast studies with you that were not performed at a [facility]  Failure to bring prior exams may result in your test needing to be rescheduled  Test Name Result Flag Reference   MAMMO SCREENING BILATERAL W 3D & CAD (Report)     Patient History:   Patient is postmenopausal and had first child at age 36  No known family history of cancer  Patient is an every day smoker, and has smoked for 1 year  Patient's BMI is 40 4  Reason for exam: screening, asymptomatic  Mammo Screening Bilateral W DBT and CAD: November 20, 2017 -    Check In #: [de-identified]   2D/3D Procedure   3D views: Bilateral MLO and CC view(s) were taken  2D views: Bilateral MLO and CC view(s) were taken  Technologist: RT South(R)(M)   The breast tissue is almost entirely fat  Bilateral digital mammography was performed as a baseline study  No dominant soft tissue mass, architectural distortion or    suspicious calcifications are noted in either breast   The skin    and nipple contours are within normal limits  ACR BI-RADSï¾® Assessments: BiRad:1 - Negative     Recommendation:   Routine screening mammogram of both breasts in 1 year  A    reminder letter will be scheduled  The patient is scheduled in a reminder system for screening    mammography       8-10% of cancers will be missed on mammography  Management of a    palpable abnormality must be based on clinical grounds  Patients    will be notified of their results via letter from our facility  Accredited by [employer] and FDA  Transcription Location: KELLY Lerma 98: [id number]     Risk Value(s):   Tyrer-Cuzick 10 Year: 2 900%, Tyrer-Cuzick Lifetime: 11 100%,    Myriad Table: 1 5%, STEVEN 5 Year: 1 5%, NCI Lifetime: 11 8%     * US ABDOMEN COMPLETE [id number] 10:06AM Michelle Quick Order Number: [id number]    - Patient Instructions: To schedule this appointment, please contact Central Scheduling at [phone]  Test Name Result Flag Reference   US ABDOMEN COMPLETE (Report)     ABDOMEN ULTRASOUND, COMPLETE WITH DOPPLER     INDICATION: Upper abdominal pain  COMPARISON: None  TECHNIQUE:  Real-time ultrasound of the abdomen was performed with a curvilinear transducer with both volumetric sweeps and still imaging techniques  FINDINGS:     PANCREAS: Visualized portions of the pancreas are within normal limits  AORTA AND IVC: Visualized portions are normal for patient age  LIVER:   Size: Within normal range  The liver measures 15 1 cm in the midclavicular line  Contour: Surface contour is smooth  Parenchyma: There is mild to moderate diffuse increased echogenicity with smooth echotexture, without significant beam attenuation or loss of periportal echogenicity  Most consistent with mild to moderate hepatic steatosis  No evidence of suspicious mass  Limited imaging of the main portal vein shows it to be patent and hepatopetal      BILIARY:   The gallbladder is normal in caliber  No wall thickening or pericholecystic fluid  No stones or sludge identified  Sonographic Candice Bruins sign is negative  No intrahepatic biliary dilatation  CBD measures 3 5 mm  No choledocholithiasis  KIDNEY:    Right kidney normal size  Within normal limits  Left kidney normal size     Within normal limits  SPLEEN:    Measures 9 3 cm  Within normal limits  ASCITES: None  IMPRESSION:     Hepatic steatosis         Workstation performed: [id number]     Signed by:   Azar Bernal MD   11/21/17

## 2018-01-12 ENCOUNTER — GENERIC CONVERSION - ENCOUNTER (OUTPATIENT)
Dept: OTHER | Facility: OTHER | Age: 53
End: 2018-01-12

## 2018-01-12 ENCOUNTER — ALLSCRIPTS OFFICE VISIT (OUTPATIENT)
Dept: OTHER | Facility: OTHER | Age: 53
End: 2018-01-12

## 2018-01-12 ENCOUNTER — APPOINTMENT (OUTPATIENT)
Dept: RADIOLOGY | Facility: CLINIC | Age: 53
End: 2018-01-12
Payer: COMMERCIAL

## 2018-01-12 DIAGNOSIS — M54.50 LOW BACK PAIN: ICD-10-CM

## 2018-01-12 DIAGNOSIS — M25.562 LEFT KNEE PAIN: ICD-10-CM

## 2018-01-12 DIAGNOSIS — M25.561 RIGHT KNEE PAIN: ICD-10-CM

## 2018-01-12 PROCEDURE — 73564 X-RAY EXAM KNEE 4 OR MORE: CPT

## 2018-01-12 NOTE — MISCELLANEOUS
Provider Comments  Provider Comments:   Dear Dawn Mcintosh,     You missed your new patient appointment with Dr Dwain Go on 10/12/2017; please contact our office to reschedule at [phone]  We understand that many situations arise that occasionally prevents patients from keeping scheduled appointments  It is the policy of [facility] that patients notify us 24 hours in advance if unable to keep a scheduled appointment  Missed appointments jeopardize strong physician-patient relationships  The appointment you missed could have easily been made available to another patient if you had contacted us to cancel  We like to accommodate all of our patients, but when patients miss an appointment it prevents us from being able to help everyone  In the future, we request at least 24 hours' notice of cancellation so we can make your appointment available to someone else in need  Sincerely,    The Physicians and Staff of [address] [place] [address] [facility]        Signatures   Electronically signed by :  Mckay Hodge, ; Oct 12 2017  3:28PM EST                       (Author)

## 2018-01-12 NOTE — MISCELLANEOUS
Signatures   Electronically signed by : Katja Roth DO; Feb 21 2017 10:56AM EST                       (Author)

## 2018-01-13 VITALS
SYSTOLIC BLOOD PRESSURE: 138 MMHG | HEART RATE: 92 BPM | RESPIRATION RATE: 18 BRPM | HEIGHT: 60 IN | BODY MASS INDEX: 42.11 KG/M2 | DIASTOLIC BLOOD PRESSURE: 100 MMHG | WEIGHT: 214.5 LBS | TEMPERATURE: 98.8 F

## 2018-01-13 VITALS
RESPIRATION RATE: 18 BRPM | HEART RATE: 86 BPM | HEIGHT: 60 IN | BODY MASS INDEX: 44.05 KG/M2 | TEMPERATURE: 98.4 F | WEIGHT: 224.38 LBS | DIASTOLIC BLOOD PRESSURE: 90 MMHG | SYSTOLIC BLOOD PRESSURE: 130 MMHG

## 2018-01-13 VITALS
HEART RATE: 84 BPM | SYSTOLIC BLOOD PRESSURE: 140 MMHG | BODY MASS INDEX: 43.04 KG/M2 | RESPIRATION RATE: 18 BRPM | DIASTOLIC BLOOD PRESSURE: 100 MMHG | HEIGHT: 60 IN | WEIGHT: 219.25 LBS

## 2018-01-13 NOTE — PROGRESS NOTES
Assessment   1  Primary localized osteoarthritis of left knee (715 16) (M17 12)   2  Primary localized osteoarthritis of right knee (715 16) (M17 11)    Plan   Knee pain    · 1 - Sara Islas DO (Orthopedic Surgery) Co-Management  *  Status: Active     Requested for: 01RGF9326  Care Summary provided  : Yes  Lumbar pain    · * XR SPINE LUMBAR 2 OR 3 VIEWS INJURY; Status:Active - Retrospective By Protocol    Authorization; Requested CFV:[id number]; Discussion/Summary      Ms Alessia De Luna has bilateral knee moderate arthritis with her left knee being worse than her right  Her right knee is more symptomatic than her left  We will obtain bilateral Synvisc injections and call her when these are available for administration  I am hoping this helps with the pain that she has from her arthritis  She will continue to work with her rheumatologist and continue to take medications as prescribed by her rheumatologist  She will follow up sooner if needed  I answered all of the patient's questions during the visit and provided education of the patient's condition during the visit  The patient verbalized understanding of the information given and agrees with the plan  This note was dictated using Arts[employer] software  It may contain errors including improperly dictated words  Please contact physician directly for any questions  Chief Complaint   presents with bilateral knee pain      History of Present Illness   HPI: 50-year-old female presents for follow-up bilateral knee pain  Since she was last seen she has been working with the rheumatologist  per the patient I think she has rheumatoid arthritis and has been treated with prednisone  She states she needs to take prednisone in order to function  At this time she has bilateral knee pain  Her right knee is worse than her left knee  Most her right knee pain is medial and lateral  She wears a brace at night which helps   She admits instability of her right knee which is worse than her left knee  She has had increased pain in her both of her knees over the past couple months  She has a fall but is unsure if it is related  Stairs are especially difficult  Her left knee is also painful but not as bad  Her left knee pain is generalized  She also has instability but not as bad as the right knee  She has steroid injection in the past but thinks only briefly help  She has a history of gastric bypass surgery is going in for further surgery in the near future  Review of Systems        Constitutional: not feeling poorly  Musculoskeletal: as noted in HPI  Active Problems   1  Acute bronchitis (466 0) (J20 9)   2  Acute foot pain, right (729 5) (M79 671)   3  Acute pain of right knee (719 46) (M25 561)   4  Clavicle pain (733 90) (M89 8X1)   5  Cough (786 2) (R05)   6  Degeneration of lumbar or lumbosacral intervertebral disc (722 52) (M51 37)   7  Depression (311) (F32 9)   8  Dermatitis fungal (111 9) (B36 9)   9  Edema (782 3) (R60 9)   10  Encounter for colorectal cancer screening (V76 51) (Z12 11,Z12 12)   11  Encounter for screening mammogram for breast cancer (V76 12) (Z12 31)   12  Flu vaccine need (V04 81) (Z23)   13  Foot injury (959 7) (S99 929A)   14  Hip pain (719 45) (M25 559)   15  Hypertension (401 9) (I10)   16  Knee pain (719 46) (M25 569)   17  Left foot pain (729 5) (M79 672)   18  Left knee pain (719 46) (M25 562)   19  Left shoulder pain (719 41) (M25 512)   20  Lumbar pain (724 2) (M54 5)   21  Mastalgia (611 71) (N64 4)   22  Mild persistent asthma without complication (440 56) (F19 63)   23  Nausea Chronic (787 02)   24  Obesity (278 00) (E66 9)   25  Obesity surgery status (V45 86) (Z98 84)   26  Omphalitis (771 4) (P38 9)   27  Pain of left upper extremity (729 5) (M79 602)   28  Photosensitivity due to sun (692 72) (L56 8)   29  Post-concussion syndrome (310 2) (F07 81)   30  Primary localized osteoarthritis of left knee (715 16) (M17 12)   31   Right upper quadrant abdominal pain (789 01) (R10 11)   32  Seronegative erosive rheumatoid arthritis (714 89) (M06 4)   33  Sleep apnea (780 57)   34  Status post bariatric surgery (V45 86) (Z98 84)   35  Stuttering (315 35) (F80 81)   36  Toxic effect of tobacco (989 84,E980 9) (T65 291A)   37  Vomiting (787 03) (R11 10)   38  Weight gain (783 1) (R63 5)    Past Medical History    · History of bronchitis (V12 69) (Z87 09)     The active problems and past medical history were reviewed and updated today  Surgical History    · History of Anal Fissurectomy   · History of  Section   · History of Laparosc Restrictive Proc Adjustable Gastric Band Placement     The surgical history was reviewed and updated today  Family History   Mother    · Family history of Congenital Heart Disease   · Family history of Diabetes Mellitus (V18 0)   · Family history of Stroke Syndrome (V17 1)  Father    · Family history of Diabetes Mellitus (V18 0)   · Family history of asthma (V17 5) (Z82 5)   · Family history of Hypertension (V17 49)  Son    · Family history of seizures (V19 8) (Z80 80)  Sister    · Family history of asthma (V17 5) (Z82 5)  Brother    · Family history of asthma (V17 5) (Z82 5)  Maternal Aunt    · Family history of asthma (V17 5) (Z82 5)     The family history was reviewed and updated today  Social History    · Advance directive information unavailable   · Current every day smoker (305 1) (F17 200)   · 1 ppd   · Domestic partner   · Denied: History of Drug Use   · Denied: History of domestic violence   · Housing Details: House   · Lives with domestic partner   · One child   · Stopped Drinking Alcohol   · Unemployed, looking for work  The social history was reviewed and updated today  Current Meds    1  Albuterol Sulfate (2 5 MG/3ML) 0 083% Inhalation Nebulization Solution; USE 1 UNIT     DOSE EVERY 4-6 HOURS AS NEEDED FOR WHEEZING ;      Therapy: 33XBJ9962 to (Evaluate:33Jsa4431)  Requested for: 31OWM3758; Last     Rx:16Nov2017 Ordered   2  Azithromycin 250 MG Oral Tablet; TAKE 2 TABLETS ON DAY 1 THEN TAKE 1 TABLET A     DAY FOR 4 DAYS; Therapy: 99OVG6377 to (Evaluate:20Nov2017)  Requested for: 79LLH2134; Last     Rx:15Nov2017 Ordered   3  Benzonatate 200 MG Oral Capsule; TAKE 1 CAPSULE 3 TIMES DAILY AS NEEDED; Therapy: 18HHJ4161 to (Evaluate:22Nov2017)  Requested for: 73AEP4633; Last     Rx:82Toi6531 Ordered   4  BuPROPion HCl ER (SR) 150 MG Oral Tablet Extended Release 12 Hour; TAKE 1     TABLET EVERY 12 HOURS DAILY; Therapy: 47NMF1961 to (Evaluate:28Apr2018)  Requested for: 94Jsd1144; Last     Rx:15Bar1594 Ordered   5  Levalbuterol HCl - 1 25 MG/3ML Inhalation Nebulization Solution; USE 1 UNIT DOSE IN     NEBULIZER EVERY 4 TO 6 HOURS AS NEEDED  Requested for: 71FML3370; Last     Rx:01Nov2016 Ordered   6  Losartan Potassium-HCTZ 100-25 MG Oral Tablet; Take 1 tablet daily; Therapy: 36RNM3664 to (Evaluate:22Nov2017)  Requested for: 21TTO0541; Last     Rx:70Yxg1324 Ordered   7  Montelukast Sodium 10 MG Oral Tablet; take 1 tablet by mouth once daily  Requested     for: 68FHF7896; Last Rx:99Lsd2462 Ordered   8  Oxaprozin 600 MG Oral Tablet; take 2 tablets daily; Therapy: 91NYX1123 to (Evaluate:76Bgl8273)  Requested for: 39AOX6044; Last     Rx:20Oct2017 Ordered   9  Spiriva Respimat 1 25 MCG/ACT Inhalation Aerosol Solution; INHALE 2 INHALATIONS     BY MOUTH DAILY; Therapy: 11RTQ9966 to (Evaluate:22Jan2018)  Requested for: 43Nlt2373; Last     Rx:98Ziu4508 Ordered   10  Symbicort 160-4 5 MCG/ACT Inhalation Aerosol; INHALE 2 PUFFS TWICE DAILY  RINSE MOUTH AFTER USE; Therapy: 17Osc2874 to (Evaluate:15Oct2016)  Requested for: 32Dae0683; Last      Rx:38Ccs0640 Ordered   11  Ventolin  (90 Base) MCG/ACT Inhalation Aerosol Solution; INHALE 2 PUFFS      FOUR TIMES DAILY AS DIRECTED;       Therapy: 04Age1030 to (Nam Frank)  Requested for: 80ZMF8499; Last      9827 1330 Ordered     The medication list was reviewed and updated today  Allergies   1  No Known Drug Allergies    Vitals   Signs   Heart Rate: 85  Systolic: 882  Diastolic: 88  Height: 5 ft 1 5 in  Weight: 211 lb 6 oz  BMI Calculated: 39 29  BSA Calculated: 1 95    Physical Exam   hearing intact    Right Knee: Appearance: Normal  Tenderness: medial joint line, but-- not over the lateral joint line,-- not the inferior pole patella,-- not on the distal patellar tendon,-- not the midportion of the patella tendon,-- not the proximal patella tendon-- and-- not the superior pole patella  Palpatory findings include crepitus-- and-- no warmth  AROM: 5-100, PROM: 3-125  Motor: Normal  Special Test: positive lateral Apley's Grind test,-- positive medial Apley's Grind test,-- positive medial Tanmay test-- and-- positive lateral Tanmay test, but-- negative patellar grind,-- negative Anterior Drawer sign,-- negative Lachman's test,-- negative Posterior Drawer sign,-- no laxity on Valgus Stress-- and-- no laxity on Varus Stress  Left Knee: Appearance: Normal  Tenderness: not over the lateral joint line,-- not over the medial joint line,-- not the inferior pole patella,-- not on the distal patellar tendon,-- not the midportion of the patella tendon,-- not the proximal patella tendon,-- not the superior pole patella-- and-- not the lateral patellar retinaculum  Palpatory findings include crepitus-- and-- no warmth  AROM: 5-100, PROM: 3-125  Motor: Normal  Special Test: positive lateral Apley's Grind test,-- positive medial Apley's Grind test-- and-- positive lateral Tanmay test, but-- negative patellar grind,-- negative medial Tanmay test,-- negative Anterior Drawer sign,-- negative Lachman test,-- negative Posterior Drawer sign,-- no laxity on Valgus Stress-- and-- no laxity on Varus Stress        Constitutional - General appearance: Normal       Musculoskeletal - Lower extremity compartments: Normal       Neurologic - Lower extremity peripheral neuro exam: Normal  Neuromuscular strength examination findings: normal bilateral foot eversion, inversion, and great toe extension  Psychiatric - Orientation to person, place, and time: Normal -- Mood and affect: Normal       Results/Data   I personally reviewed the films/images/results in the office today  My interpretation follows  X-ray Review X-ray right knee: Moderate DJD   x-ray left knee: Moderate DJD, left knee is worse than right        Future Appointments      Date/Time Provider Specialty Site   01/17/2018 11:00 AM Yvonne Pedro DO Gastroenterology Adult [facility] GASTROENTEROLOGY  [facility]   01/24/2018 08:00 AM ARSEN Rucker, [address] [facility]     Signatures    Electronically signed by : Zahra Duval DO; Jan 12 2018 10:38AM EST                       (Author)

## 2018-01-15 NOTE — RESULT NOTES
Verified Results  (1) CBC/ PLT (NO DIFF) 19Oct2016 11:43AM Denia Lenz    Order Number: [id number]     Test Name Result Flag Reference   HEMATOCRIT 38 8 %  34 8-46 1   HEMOGLOBIN 12 6 g/dL  11 5-15 4   MCHC 32 5 g/dL  31 4-37 4   MCH 29 0 pg  26 8-34 3   MCV 89 fL  82-98   PLATELET COUNT 146 Thousands/uL  149-390   RBC COUNT 4 34 Million/uL  3 81-5 12   RDW 14 6 %  11 6-15 1   WBC COUNT 8 67 Thousand/uL  4 31-10 16   MPV 10 1 fL  8 9-12 7     (1) C-REACTIVE PROTEIN 19Oct2016 11:43AM [place] Cat Order Number: [id number]     Test Name Result Flag Reference   C-REACT PROTEIN 10 1 mg/L H <3 0     (1) [facility] [id number] [facility]: [id number]     Test Name Result Flag Reference   SED RATE 39 mm/hour H 0-20

## 2018-01-17 ENCOUNTER — GENERIC CONVERSION - ENCOUNTER (OUTPATIENT)
Dept: OTHER | Facility: OTHER | Age: 53
End: 2018-01-17

## 2018-01-22 VITALS
DIASTOLIC BLOOD PRESSURE: 88 MMHG | WEIGHT: 211.38 LBS | SYSTOLIC BLOOD PRESSURE: 137 MMHG | HEART RATE: 85 BPM | BODY MASS INDEX: 38.9 KG/M2 | HEIGHT: 62 IN

## 2018-01-23 NOTE — MISCELLANEOUS
Provider Comments  Provider Comments:   Dear Marbella Kelley,    You missed your appointment with Dr Albaro Vegas on 1/17/2018; please contact our office to reschedule at [phone]  We understand that many situations arise that occasionally prevents patients from keeping scheduled appointments  It is the policy of [facility] that patients notify us 24 hours in advance if unable to keep a scheduled appointment  Missed appointments jeopardize strong physician-patient relationships  The appointment you missed could have easily been made available to another patient if you had contacted us to cancel  We like to accommodate all of our patients, but when patients miss an appointment it prevents us from being able to help everyone  In the future, we request at least 24 hours' notice of cancellation so we can make your appointment available to someone else in need       Sincerely,    The Physicians and Staff of [facility]          Signatures   Electronically signed by : Urban Pappas, ; Jan 17 2018  2:14PM EST                       (Author)    Electronically signed by : Urban Pappas, ; Jan 17 2018  2:24PM EST                       (Author)

## 2018-01-24 ENCOUNTER — OFFICE VISIT (OUTPATIENT)
Dept: BARIATRICS | Facility: CLINIC | Age: 53
End: 2018-01-24

## 2018-01-24 ENCOUNTER — TRANSCRIBE ORDERS (OUTPATIENT)
Dept: BARIATRICS | Facility: CLINIC | Age: 53
End: 2018-01-24

## 2018-01-24 VITALS
HEART RATE: 94 BPM | WEIGHT: 211 LBS | RESPIRATION RATE: 16 BRPM | SYSTOLIC BLOOD PRESSURE: 122 MMHG | DIASTOLIC BLOOD PRESSURE: 90 MMHG | BODY MASS INDEX: 38.83 KG/M2 | HEIGHT: 62 IN | TEMPERATURE: 99.6 F

## 2018-01-24 VITALS
SYSTOLIC BLOOD PRESSURE: 132 MMHG | DIASTOLIC BLOOD PRESSURE: 74 MMHG | HEART RATE: 78 BPM | TEMPERATURE: 98.2 F | BODY MASS INDEX: 39.42 KG/M2 | RESPIRATION RATE: 18 BRPM | HEIGHT: 61 IN | WEIGHT: 208.8 LBS

## 2018-01-24 DIAGNOSIS — E66.01 MORBID (SEVERE) OBESITY DUE TO EXCESS CALORIES (HCC): ICD-10-CM

## 2018-01-24 DIAGNOSIS — E66.9 ADULT-ONSET OBESITY: Primary | ICD-10-CM

## 2018-01-24 DIAGNOSIS — Z98.84 BARIATRIC SURGERY STATUS: Primary | ICD-10-CM

## 2018-01-24 PROCEDURE — 99999 PR OFFICE/OUTPT VISIT,PROCEDURE ONLY: CPT

## 2018-01-24 NOTE — PROGRESS NOTES
Bariatric Behavioral Health Evaluation    Presenting Problem    Is the patient seeking Bariatric Surgery Eval? Yes  If yes how long have you researched this surgery option  Realizes Post- Op Requirements? Yes     Pre-morbid level of function and history of present illness:     Psychiatric/Psychological Treatment Diagnosis: Patient treated for depression and anxiety by PCP  Patient had a work related injury and suffered a brain injury  Patient meet with therapist at [facility] and [facility]     Outpatient Counselor Yes  Counselor Phone    Psychiatrist No Dr Nemo Cuadra Phone None     Have you had Inpatient Treatment? Yes     Family Constellation (include relationship with each and Psych/Med HX)None reported      Mother: 68years old, Father 78years old, 2 sisters and 2 brothers and 1 child     Domestic Violence No  The patient is the:  Are they currently in the situation? No    Additional comments/stressors related to family/relationships/peer support  Positive family relationships  Stressors; current challenges with Lap band, recovery from brain injury  Physical/Psychological Assessment:     Appearance: appropriate  Sociability: average  Affect: appropriate  Mood: calm  Thought Process: coherent  Speech: normal  Content: no impairment  Orientation: person  Yes   Insight: emotional  good    Risk Assessment:     none    Recommendations: Recommended for surgery  yes    Risk of Harm to Self or Others:  No suicidal  Ideations       Observation:       Access to weapons no     Weapons secured by None     Based on the previous information, the client presents the following risk of harm to self or others: low     Note

## 2018-01-24 NOTE — PROGRESS NOTES
Pablo Ross,   1965    Bariatric Nutrition Assessment Note    Type of surgery    Adjustable gastric band  Surgery Date:   8 years  post-op  Surgeon: Dr Abdoulaye Martínez  47 y o   female   208 lbs, 12 8 oz  5'1"  Wt with BMI of 25: 134 1  Body mass index is 39 45 kg/m²  There were no vitals filed for this visit  Weight History   Onset of Obesity: Adult  Family history of obesity: Yes  Wt Loss Attempts: FAD Diets (Cabbage soup, Grapefruit, Cleanse, etc )  OTC meds/supplements      Review of History and Medications   Past Medical History:   Diagnosis Date    Anxiety     Arthritis     o a  left knee    Asthma     Brain injury ([address] )     CPAP (continuous positive airway pressure) dependence     Depression     Hypertension     Obese     Sleep apnea      Past Surgical History:   Procedure Laterality Date    ANAL FISSURECTOMY       SECTION      LAPAROSCOPIC GASTRIC BANDING      [place] EGD TRANSORAL BIOPSY SINGLE/MULTIPLE N/A 1/3/2018    Procedure: ESOPHAGOGASTRODUODENOSCOPY (EGD) with biopsy;  Surgeon: Leah Cardoso MD;  Location: [facility];   Service: Bariatrics     Social History     Social History    Marital status: Single     Spouse name: N/A    Number of children: N/A    Years of education: N/A     Social History Main Topics    Smoking status: Current Every Day Smoker     Packs/day: 0 20    Smokeless tobacco: Never Used    Alcohol use Yes      Comment: occasional    Drug use: No    Sexual activity: Not on file     Other Topics Concern    Not on file     Social History Narrative    No narrative on file       Current Outpatient Prescriptions:     albuterol (2 5 mg/3 mL) 0 083 % nebulizer solution, Take 2 5 mg by nebulization every 6 (six) hours as needed for wheezing, Disp: , Rfl:     budesonide-formoterol (SYMBICORT) 160-4 5 mcg/act inhaler, Inhale 2 puffs 2 (two) times a day, Disp: , Rfl:     fluticasone (FLONASE) 50 mcg/act nasal spray, 1 spray into each nostril daily, Disp: , Rfl:     levalbuterol (XOPENEX HFA) 45 mcg/act inhaler, Inhale 1-2 puffs every 4 (four) hours as needed for wheezing, Disp: , Rfl:     losartan 50 mg TABS 100 mg, hydrochlorothiazide 25 mg TABS 25 mg, Take 1 tablet by mouth daily  , Disp: , Rfl:     montelukast (SINGULAIR) 10 mg tablet, Take 10 mg by mouth daily at bedtime, Disp: , Rfl:     oxaprozin (DAYPRO) 600 MG tablet, Take 600 mg by mouth 2 (two) times a day, Disp: , Rfl:     predniSONE 10 mg tablet, Take 10 mg by mouth 2 (two) times a day, Disp: , Rfl:     spironolactone (ALDACTONE) 25 mg tablet, Take 25 mg by mouth as needed, Disp: , Rfl:     Tiotropium Bromide Monohydrate (SPIRIVA RESPIMAT) 1 25 MCG/ACT AERS, Inhale daily, Disp: , Rfl:   Food Intake and Lifestyle Assessment   Food Intake Assessment completed via 24 hour recall  Breakfast: none: two cups coffee with creamer  Snack: none   Lunch: none  Snack: none  Dinner: small piece boneless chicken  Snack: none  Beverage intake: water  Protein supplement: none  Estimated fluid intake per day: more than one gallon daily  Typical meal pattern: 1 meals per day and 0 snacks per day  Eating Behaviors: Emotional eating  Food allergies or intolerances: Allergies   Allergen Reactions    Other      Adhesive tape    Sulfasalazine Rash     Cultural or Christian considerations: none    Physical Assessment  Physical Activity  Types of exercise: Walking  Current physical limitations: rheumatoid arthritis causing significant pain per pt    Psychosocial Assessment   Support systems: significant other children    Nutrition Diagnosis  Diagnosis: Overweight / Obesity (NC-3 3)  Related to: Physical inactivity  As Evidenced by: BMI >25 and Unintentional weight gain     Nutrition Prescription: Recommend the following diet  Low fat, Low sugar, High protein and Soft    Interventions and Teaching   Discussed pre-op and post-op nutrition guidelines         Patient educated and handouts provided  Surgical changes to stomach / GI  Capacity of post-surgery stomach  Diet progression  Adequate hydration  Sugar and fat restriction to decrease "dumping syndrome"  Exercise  Suggestions for pre-op diet  Nutrition considerations after surgery  Protein supplements  Meal planning and preparation  Appropriate carbohydrate, protein, and fat intake, and food/fluid choices to maximize safe weight loss, nutrient intake, and tolerance   Dietary and lifestyle changes  Possible problems with poor eating habits  Techniques for self monitoring and keeping daily food journal  Potential for food intolerance after surgery, and ways to deal with them including: lactose intolerance, nausea, reflux, vomiting, diarrhea, food intolerance, appetite changes, gas  Vitamin / Mineral supplementation of pt not currently taking any vitamins or supplements  Education provided to: patient    Barriers to learning: pt has inappropriate adaptive eating behaviors due to excessive GERD/Reflux, nausea, vomitting    Readiness to change: preparation    Prior research on procedure: discussed with provider and previous wt  loss surgery    Comprehension: needs reinforcement and verbalizes understanding     Expected Compliance: good  Recommendations  Pt is an appropriate candidate for surgery   Yes    Evaluation / Monitoring  Dietitian to Monitor: Eating pattern as discussed Tolerance of nutrition prescription Body weight Lab values Physical activity Bowel pattern    Goals  Food journal, Exercise 30 minutes 5 times per week, Complete lession plans 1-6 and Eat 3 meals per day    Time Spent:   1 Hour

## 2018-01-24 NOTE — PROGRESS NOTES
Bariatric Behavioral Health Evaluation    Presenting Problem; Improve health     Is the patient seeking Bariatric Surgery Eval? Yes  If yes how long have you researched this surgery option  Patient had lap band placed in  at [facility]  She is being considered for a revision  Realizes Post- Op Requirements? 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 No    Family Constellation (include relationship with each and Psych/Med HX)    Domestic Violence No    Are they currently in the situation? No    Additional comments/stressors related to family/relationships/peer support; patient is a single mother of an autistic child, unemployed due to a work related injury and recently niece  suddenly ( 2018)  Appearance: appropriate  Sociability: average  Affect:   Mood: Thought Process:   Speech:   Content:   Orientation: person  Yes   Insight: emotional  good    Risk Assessment: No history of suicidal ideations or attempts    Recommendations: Recommended for surgery  yes    Risk of Harm to Self or Others:     Observation:       Access to weapons no     Weapons secured by  None reported     Based on the previous information, the client presents the following risk of harm to self or others: low     Note;  Met with patient to complete Behavioral Health Assessment  Patient admits to axis 1 diagnosis of anxiety and depression; not on medication but monitored by medical provider  Patient meets criteria for [facility]ariat[facility] program and is therefore referred to surgeon

## 2018-01-31 ENCOUNTER — TELEPHONE (OUTPATIENT)
Dept: OBGYN CLINIC | Facility: HOSPITAL | Age: 53
End: 2018-01-31

## 2018-01-31 ENCOUNTER — TELEPHONE (OUTPATIENT)
Dept: FAMILY MEDICINE CLINIC | Facility: CLINIC | Age: 53
End: 2018-01-31

## 2018-01-31 NOTE — TELEPHONE ENCOUNTER
Pt would like to discuss potential MRI   Please note that pt was denied Euflexxa by insurance 1/16/18

## 2018-01-31 NOTE — TELEPHONE ENCOUNTER
[facility] Dr Emani Carlson patient  - [phone]  Patient last saw dr Pooja Jack on 1/12/18  She calls today to request an MRI of the right knee  She only had xrays done and she really thinks she needs an MRI to know what is wrong with it

## 2018-02-01 DIAGNOSIS — M17.11 PRIMARY OSTEOARTHRITIS OF RIGHT KNEE: Primary | ICD-10-CM

## 2018-02-01 NOTE — TELEPHONE ENCOUNTER
I called and talked to the patient this morning  She states that she wants an MRI of her right knee done  I informed her that she has moderate arthritis in her knee so even if she has a meniscal tear we may recommend conservative treatment  She understands and agrees    She also would not like me to do a peer to peer for the [facility] approval because she wants the MRI first

## 2018-02-10 ENCOUNTER — HOSPITAL ENCOUNTER (OUTPATIENT)
Dept: MRI IMAGING | Facility: HOSPITAL | Age: 53
Discharge: HOME | End: 2018-02-10
Payer: COMMERCIAL

## 2018-02-10 DIAGNOSIS — M17.11 PRIMARY OSTEOARTHRITIS OF RIGHT KNEE: ICD-10-CM

## 2018-02-10 PROCEDURE — 73721 MRI JNT OF LWR EXTRE W/O DYE: CPT

## 2018-02-16 ENCOUNTER — OFFICE VISIT (OUTPATIENT)
Dept: OBGYN CLINIC | Facility: MEDICAL CENTER | Age: 53
End: 2018-02-16
Payer: COMMERCIAL

## 2018-02-16 VITALS
HEART RATE: 92 BPM | WEIGHT: 208.4 LBS | BODY MASS INDEX: 39.38 KG/M2 | DIASTOLIC BLOOD PRESSURE: 85 MMHG | SYSTOLIC BLOOD PRESSURE: 142 MMHG

## 2018-02-16 DIAGNOSIS — S83.241A ACUTE MENISCAL TEAR, MEDIAL, RIGHT, INITIAL ENCOUNTER: Primary | ICD-10-CM

## 2018-02-16 DIAGNOSIS — M17.11 PRIMARY OSTEOARTHRITIS OF RIGHT KNEE: ICD-10-CM

## 2018-02-16 DIAGNOSIS — S83.281A ACUTE MENISCAL TEAR, LATERAL, RIGHT, INITIAL ENCOUNTER: ICD-10-CM

## 2018-02-16 PROCEDURE — 99214 OFFICE O/P EST MOD 30 MIN: CPT | Performed by: ORTHOPAEDIC SURGERY

## 2018-02-16 NOTE — PROGRESS NOTES
Assessment/Plan:  1  Acute meniscal tear, medial, right, initial encounter    2  Acute meniscal tear, lateral, right, initial encounter    3  Primary osteoarthritis of right knee      Orders Placed This Encounter   Procedures    Brace     Ms  Ave Patino has medial and lateral meniscal tears as well as arthritis in her knee  She has a history of rheumatoid arthritis  We discussed her treatment options today  We discussed the risks and benefits of her treatment options  She has elected to move forward the surgical arthroscopy of the right knee  The risks of surgery include, but are not limited to infection, blood clot, wound healing problems, blood loss, damage to blood vessels and nerves, persistent pain and stiffness, need for additional surgery, heart attack, stroke, death  The patient understood and agreed to by oral and written consent  I answered all questions regarding surgery  She understands and knee arthroscopy may not relieve all of her symptoms  She understands she is at increased risk for wound healing problems due to being on prednisone and being a smoker  She is working on quitting smoking and has an appointment with her primary care physician regarding this  She will see her primary care physician for clearance  She will follow up 1 week from the date of surgery  She will work on range-of-motion exercises on her own  She declined physical therapy  She will wear a knee brace for comfort in the meantime  She will continue on her current pain regimen as per her rheumatologist      Return for 1 week after sx  Subjective   Chief Complaint:   Chief Complaint   Patient presents with    Right Knee - Follow-up       Freddie Perez is a 47 y o  female who presents for follow up for right knee pain  Since her last visit she obtain an MRI  She continues to have medial and lateral right knee pain  She admits instability  Swelling is also problem  Stairs are specially difficult    She is currently on prednisone and oxaprozin for her rheumatoid arthritis  She does not think the oxaprozin is helping  She admits episodes of instability with her right knee  She is having a revision of her gastric bypass surgery and repair of a hernia in mid April  Review of Systems  ROS:    See HPI for musculoskeletal review  All other systems reviewed are negative     History:  Past Medical History:   Diagnosis Date    Anxiety     Arthritis     o a  left knee    Asthma     Brain injury ([address] )     CPAP (continuous positive airway pressure) dependence     Depression     Hypertension     Obese     Sleep apnea      Past Surgical History:   Procedure Laterality Date    ANAL FISSURECTOMY       SECTION      LAPAROSCOPIC GASTRIC BANDING      [place] EGD TRANSORAL BIOPSY SINGLE/MULTIPLE N/A 1/3/2018    Procedure: ESOPHAGOGASTRODUODENOSCOPY (EGD) with biopsy;  Surgeon: Sahara Gregorio MD;  Location: [facility];   Service: Bariatrics     Social History   History   Alcohol Use    Yes     Comment: occasional     History   Drug Use No     History   Smoking Status    Current Every Day Smoker    Packs/day: 0 20   Smokeless Tobacco    Never Used     Family History:   Family History   Problem Relation Age of Onset    No Known Problems Mother     No Known Problems Father        Meds/Allergies     (Not in a hospital admission)  Allergies   Allergen Reactions    Other      Adhesive tape    Sulfasalazine Rash          Objective     /85   Pulse 92   Wt 94 5 kg (208 lb 6 4 oz)   BMI 39 38 kg/m²      PE:  AAOx 3  WDWN  Hearing intact, no drainage from eyes  no audible wheezing  Regular rate  no abdominal distension  LE compartments soft, skin intact    Ortho Exam:  right Knee:   No erythema  + swelling  + effusion  no warmth  AROM:   PROM: 5-115  Stable to varus/valgus stress    Imaging Studies: I have personally reviewed pertinent films in PACS   MRI R knee:  Medial and lateral menical tears, arthritis, effusion

## 2018-02-19 ENCOUNTER — APPOINTMENT (OUTPATIENT)
Dept: LAB | Facility: HOSPITAL | Age: 53
End: 2018-02-19
Payer: COMMERCIAL

## 2018-02-19 ENCOUNTER — HOSPITAL ENCOUNTER (OUTPATIENT)
Dept: RADIOLOGY | Facility: HOSPITAL | Age: 53
Discharge: HOME | End: 2018-02-19
Payer: COMMERCIAL

## 2018-02-19 ENCOUNTER — TRANSCRIBE ORDERS (OUTPATIENT)
Dept: LAB | Facility: CLINIC | Age: 53
End: 2018-02-19

## 2018-02-19 ENCOUNTER — OFFICE VISIT (OUTPATIENT)
Dept: FAMILY MEDICINE CLINIC | Facility: CLINIC | Age: 53
End: 2018-02-19
Payer: COMMERCIAL

## 2018-02-19 ENCOUNTER — APPOINTMENT (OUTPATIENT)
Dept: LAB | Facility: CLINIC | Age: 53
End: 2018-02-19
Payer: COMMERCIAL

## 2018-02-19 ENCOUNTER — TELEPHONE (OUTPATIENT)
Dept: FAMILY MEDICINE CLINIC | Facility: CLINIC | Age: 53
End: 2018-02-19

## 2018-02-19 VITALS
HEIGHT: 61 IN | HEART RATE: 78 BPM | RESPIRATION RATE: 16 BRPM | SYSTOLIC BLOOD PRESSURE: 130 MMHG | DIASTOLIC BLOOD PRESSURE: 80 MMHG | WEIGHT: 209 LBS | BODY MASS INDEX: 39.46 KG/M2

## 2018-02-19 DIAGNOSIS — D72.829 LEUKOCYTOSIS, UNSPECIFIED TYPE: Primary | ICD-10-CM

## 2018-02-19 DIAGNOSIS — Z01.818 PRE-OPERATIVE CLEARANCE: Primary | ICD-10-CM

## 2018-02-19 DIAGNOSIS — R94.31 ABNORMAL EKG: ICD-10-CM

## 2018-02-19 DIAGNOSIS — Z01.818 PRE-OPERATIVE CLEARANCE: ICD-10-CM

## 2018-02-19 DIAGNOSIS — I10 ESSENTIAL HYPERTENSION: ICD-10-CM

## 2018-02-19 DIAGNOSIS — J45.30 MILD PERSISTENT ASTHMA WITHOUT COMPLICATION: ICD-10-CM

## 2018-02-19 DIAGNOSIS — D72.829 LEUKOCYTOSIS, UNSPECIFIED TYPE: ICD-10-CM

## 2018-02-19 PROBLEM — M17.0 OSTEOARTHRITIS OF BOTH KNEES: Status: ACTIVE | Noted: 2018-02-16

## 2018-02-19 PROBLEM — M06.00: Status: ACTIVE | Noted: 2017-07-26

## 2018-02-19 PROBLEM — M89.8X1 CLAVICLE PAIN: Status: ACTIVE | Noted: 2017-11-15

## 2018-02-19 LAB
ALBUMIN SERPL BCP-MCNC: 3.4 G/DL (ref 3.5–5)
ALP SERPL-CCNC: 61 U/L (ref 46–116)
ALT SERPL W P-5'-P-CCNC: 15 U/L (ref 12–78)
ANION GAP SERPL CALCULATED.3IONS-SCNC: 7 MMOL/L (ref 4–13)
AST SERPL W P-5'-P-CCNC: 10 U/L (ref 5–45)
BASOPHILS # BLD AUTO: 0.04 THOUSANDS/ΜL (ref 0–0.1)
BASOPHILS NFR BLD AUTO: 0 % (ref 0–1)
BILIRUB SERPL-MCNC: 0.31 MG/DL (ref 0.2–1)
BUN SERPL-MCNC: 17 MG/DL (ref 5–25)
CALCIUM SERPL-MCNC: 9 MG/DL (ref 8.3–10.1)
CHLORIDE SERPL-SCNC: 105 MMOL/L (ref 100–108)
CO2 SERPL-SCNC: 28 MMOL/L (ref 21–32)
CREAT SERPL-MCNC: 0.78 MG/DL (ref 0.6–1.3)
CRP SERPL HS-MCNC: 39.9 MG/L
EOSINOPHIL # BLD AUTO: 0.08 THOUSAND/ΜL (ref 0–0.61)
EOSINOPHIL NFR BLD AUTO: 1 % (ref 0–6)
ERYTHROCYTE [DISTWIDTH] IN BLOOD BY AUTOMATED COUNT: 15.6 % (ref 11.6–15.1)
GFR SERPL CREATININE-BSD FRML MDRD: 87 ML/MIN/1.73SQ M
GLUCOSE P FAST SERPL-MCNC: 103 MG/DL (ref 65–99)
HCT VFR BLD AUTO: 38.6 % (ref 34.8–46.1)
HGB BLD-MCNC: 12.2 G/DL (ref 11.5–15.4)
LYMPHOCYTES # BLD AUTO: 1.27 THOUSANDS/ΜL (ref 0.6–4.47)
LYMPHOCYTES NFR BLD AUTO: 8 % (ref 14–44)
MCH RBC QN AUTO: 26.7 PG (ref 26.8–34.3)
MCHC RBC AUTO-ENTMCNC: 31.6 G/DL (ref 31.4–37.4)
MCV RBC AUTO: 85 FL (ref 82–98)
MONOCYTES # BLD AUTO: 0.82 THOUSAND/ΜL (ref 0.17–1.22)
MONOCYTES NFR BLD AUTO: 5 % (ref 4–12)
NEUTROPHILS # BLD AUTO: 14.63 THOUSANDS/ΜL (ref 1.85–7.62)
NEUTS SEG NFR BLD AUTO: 86 % (ref 43–75)
NRBC BLD AUTO-RTO: 0 /100 WBCS
PLATELET # BLD AUTO: 578 THOUSANDS/UL (ref 149–390)
PMV BLD AUTO: 9.1 FL (ref 8.9–12.7)
POTASSIUM SERPL-SCNC: 3.7 MMOL/L (ref 3.5–5.3)
PROT SERPL-MCNC: 7.4 G/DL (ref 6.4–8.2)
RBC # BLD AUTO: 4.57 MILLION/UL (ref 3.81–5.12)
SODIUM SERPL-SCNC: 140 MMOL/L (ref 136–145)
WBC # BLD AUTO: 16.89 THOUSAND/UL (ref 4.31–10.16)

## 2018-02-19 PROCEDURE — 87086 URINE CULTURE/COLONY COUNT: CPT

## 2018-02-19 PROCEDURE — 3075F SYST BP GE 130 - 139MM HG: CPT | Performed by: FAMILY MEDICINE

## 2018-02-19 PROCEDURE — 80053 COMPREHEN METABOLIC PANEL: CPT

## 2018-02-19 PROCEDURE — 93000 ELECTROCARDIOGRAM COMPLETE: CPT | Performed by: FAMILY MEDICINE

## 2018-02-19 PROCEDURE — 3079F DIAST BP 80-89 MM HG: CPT | Performed by: FAMILY MEDICINE

## 2018-02-19 PROCEDURE — 99214 OFFICE O/P EST MOD 30 MIN: CPT | Performed by: FAMILY MEDICINE

## 2018-02-19 PROCEDURE — 85025 COMPLETE CBC W/AUTO DIFF WBC: CPT

## 2018-02-19 PROCEDURE — 3725F SCREEN DEPRESSION PERFORMED: CPT | Performed by: FAMILY MEDICINE

## 2018-02-19 PROCEDURE — 86141 C-REACTIVE PROTEIN HS: CPT

## 2018-02-19 PROCEDURE — 71046 X-RAY EXAM CHEST 2 VIEWS: CPT

## 2018-02-19 PROCEDURE — 36415 COLL VENOUS BLD VENIPUNCTURE: CPT

## 2018-02-19 NOTE — PROGRESS NOTES
Assessment/Plan:    No problem-specific Assessment & Plan notes found for this encounter  There are no diagnoses linked to this encounter  Subjective:      Patient ID: Tammie Roberson is a 47 y o  female  HPI    The following portions of the patient's history were reviewed and updated as appropriate: allergies, current medications, past family history, past medical history, past social history, past surgical history and problem list     Review of Systems   Constitutional: Negative for unexpected weight change  HENT: Negative for ear pain and sore throat  Respiratory: Positive for wheezing  Negative for cough  Cardiovascular: Negative for chest pain  Gastrointestinal: Negative for abdominal pain and diarrhea  Musculoskeletal: Positive for arthralgias  Knee pain   Neurological: Negative for dizziness and headaches  Objective:      /80 (BP Location: Right arm, Patient Position: Sitting, Cuff Size: Adult)   Pulse 78   Resp 16   Ht 5' 1" (1 549 m)   Wt 94 8 kg (209 lb)   BMI 39 49 kg/m²          Physical Exam   Constitutional: She appears well-developed and well-nourished  HENT:   Right Ear: External ear normal    Left Ear: External ear normal    Mouth/Throat: Oropharynx is clear and moist  No oropharyngeal exudate  Eyes: Right eye exhibits no discharge  Left eye exhibits no discharge  Neck: No thyromegaly present  Cardiovascular: Normal rate and regular rhythm  Pulmonary/Chest: Breath sounds normal    Abdominal: Soft  Bowel sounds are normal    Musculoskeletal: She exhibits no edema  Lymphadenopathy:     She has no cervical adenopathy  Pre-Op Evaluation  Tammie Roberson is a 47 y o  female who presents to the office today for a preoperative consultation at the request of surgeon Dr Nessa Mckeon who plans on performing r knee arthroscopy on March 6   This consultation is requested for the specific conditions prompting preoperative evaluation (i e  because of potential affect on operative risk):asthma and smoking  Planned anesthesia is general  The patient has the following known anesthesia issues: none Patient has a bleeding risk of: no recent abnormal bleeding  Patient does not have objections to receiving blood products if needed      The following portions of the patient's history were reviewed and updated as appropriate: allergies, current medications, past family history, past medical history, past social history, past surgical history and problem list

## 2018-02-19 NOTE — TELEPHONE ENCOUNTER
Pt notified  ----- Message from Carine Ordoñez MD sent at 2/19/2018  5:39 PM EST -----  Chest xry nl,await urine culture

## 2018-02-19 NOTE — PATIENT INSTRUCTIONS
Check and see what is covered for stopping smoking, cleared for surgery pending cardiology eval Check HS CRP

## 2018-02-20 DIAGNOSIS — D72.829 LEUKOCYTOSIS, UNSPECIFIED TYPE: Primary | ICD-10-CM

## 2018-02-20 LAB — BACTERIA UR CULT: NORMAL

## 2018-02-21 ENCOUNTER — TELEPHONE (OUTPATIENT)
Dept: FAMILY MEDICINE CLINIC | Facility: CLINIC | Age: 53
End: 2018-02-21

## 2018-02-21 NOTE — TELEPHONE ENCOUNTER
----- Message from Carol Monroy MD sent at 2/20/2018  6:21 PM EST -----  Urine nl, rec repeat CBC next week

## 2018-02-21 NOTE — TELEPHONE ENCOUNTER
Relayed message with results  Pt  Will go next week to have cbc drawn  She asked if not yet put in the system can you put it in so they can pull it up when she goes

## 2018-02-23 ENCOUNTER — OFFICE VISIT (OUTPATIENT)
Dept: CARDIOLOGY CLINIC | Facility: CLINIC | Age: 53
End: 2018-02-23
Payer: COMMERCIAL

## 2018-02-23 VITALS
DIASTOLIC BLOOD PRESSURE: 84 MMHG | HEIGHT: 61 IN | WEIGHT: 205 LBS | SYSTOLIC BLOOD PRESSURE: 122 MMHG | BODY MASS INDEX: 38.71 KG/M2 | HEART RATE: 84 BPM

## 2018-02-23 DIAGNOSIS — Z01.810 PREOP CARDIOVASCULAR EXAM: Primary | ICD-10-CM

## 2018-02-23 DIAGNOSIS — R94.31 ABNORMAL EKG: ICD-10-CM

## 2018-02-23 PROCEDURE — 99203 OFFICE O/P NEW LOW 30 MIN: CPT | Performed by: INTERNAL MEDICINE

## 2018-02-23 NOTE — PROGRESS NOTES
Cardiology Follow Up    Cristine Marvin  1965  [id number]  [address] [facility]  [address] [place] [address] [address]    1  Preop cardiovascular exam  NM myocardial perfusion spect (stress and/or rest)   2  Abnormal EKG  NM myocardial perfusion spect (stress and/or rest)       Interval History:  Cardiology consultation  21-year-old female who has no previous cardiac history  Patient scheduled to have bariatric surgery as well as knee surgery in the near future  Preoperatively EKG was borderline abnormal, personally reviewed  Normal sinus rhythm  With nonspecific ST segment changes in the inferior leads  Patient currently asymptomatic from the cardiac point of view denies any chest pain or dyspnea  Sedentary lifestyle  She does have issues with her knee as stated above  No regular exercise  Patient Active Problem List   Diagnosis    Arthritis    Osteoarthritis of both knees    Acute meniscal tear, medial, right, initial encounter    Acute meniscal tear, lateral, right, initial encounter    DDD (degenerative disc disease), lumbosacral    Clavicle pain    Depression    Hypertension    Mild persistent asthma without complication    Nausea without vomiting    Obesity    Post-concussion syndrome    Seronegative erosive rheumatoid arthritis ([address] )    Sleep apnea    Stuttering    Toxic effect of tobacco    Abnormal EKG    Preop cardiovascular exam     Past Medical History:   Diagnosis Date    Anxiety     Arthritis     o a  left knee    Asthma     Brain injury ([address] )     CPAP (continuous positive airway pressure) dependence     Depression     Hypertension     Obese     Sleep apnea      Social History     Social History    Marital status: Single     Spouse name: N/A    Number of children: N/A    Years of education: N/A     Occupational History    Not on file       Social History Main Topics    Smoking status: Current Every Day Smoker     Packs/day: 0 20    Smokeless tobacco: Never Used    Alcohol use Yes      Comment: occasional    Drug use: No    Sexual activity: Yes     Partners: Female     Other Topics Concern    Not on file     Social History Narrative    One child    Lives in house with partner    disabled      Family History   Problem Relation Age of Onset    Diabetes type II Mother     Hypertension Mother     Arthritis Mother     Diabetes type II Father     Hypertension Father     Sudden death Other 28     niece     Past Surgical History:   Procedure Laterality Date    ANAL FISSURECTOMY       SECTION      LAPAROSCOPIC GASTRIC BANDING      AK EGD TRANSORAL BIOPSY SINGLE/MULTIPLE N/A 1/3/2018    Procedure: ESOPHAGOGASTRODUODENOSCOPY (EGD) with biopsy;  Surgeon: Lobito White MD;  Location: [facility];   Service: Bariatrics       Current Outpatient Prescriptions:     albuterol (2 5 mg/3 mL) 0 083 % nebulizer solution, Take 2 5 mg by nebulization every 6 (six) hours as needed for wheezing, Disp: , Rfl:     budesonide-formoterol (SYMBICORT) 160-4 5 mcg/act inhaler, Inhale 2 puffs 2 (two) times a day, Disp: , Rfl:     fluticasone (FLONASE) 50 mcg/act nasal spray, 1 spray into each nostril daily, Disp: , Rfl:     levalbuterol (XOPENEX HFA) 45 mcg/act inhaler, Inhale 1-2 puffs every 4 (four) hours as needed for wheezing, Disp: , Rfl:     losartan 50 mg TABS 100 mg, hydrochlorothiazide 25 mg TABS 25 mg, Take 1 tablet by mouth daily  , Disp: , Rfl:     montelukast (SINGULAIR) 10 mg tablet, Take 10 mg by mouth daily at bedtime, Disp: , Rfl:     oxaprozin (DAYPRO) 600 MG tablet, Take 600 mg by mouth 2 (two) times a day, Disp: , Rfl:     predniSONE 10 mg tablet, Take 10 mg by mouth 2 (two) times a day, Disp: , Rfl:     spironolactone (ALDACTONE) 25 mg tablet, Take 25 mg by mouth as needed, Disp: , Rfl:     Tiotropium Bromide Monohydrate (SPIRIVA RESPIMAT) 1 25 MCG/ACT AERS, Inhale daily, Disp: , Rfl:   Allergies   Allergen Reactions    Other      Adhesive tape    Sulfasalazine Rash       Labs:  Appointment on 02/19/2018   Component Date Value    Urine Culture 02/19/2018 10,000-19,000 cfu/ml     Appointment on 02/19/2018   Component Date Value    WBC 02/19/2018 16 89*    RBC 02/19/2018 4 57     Hemoglobin 02/19/2018 12 2     Hematocrit 02/19/2018 38 6     MCV 02/19/2018 85     MCH 02/19/2018 26 7*    MCHC 02/19/2018 31 6     RDW 02/19/2018 15 6*    MPV 02/19/2018 9 1     Platelets 92/73/7897 578*    nRBC 02/19/2018 0     Neutrophils Relative 02/19/2018 86*    Lymphocytes Relative 02/19/2018 8*    Monocytes Relative 02/19/2018 5     Eosinophils Relative 02/19/2018 1     Basophils Relative 02/19/2018 0     Neutrophils Absolute 02/19/2018 14 63*    Lymphocytes Absolute 02/19/2018 1 27     Monocytes Absolute 02/19/2018 0 82     Eosinophils Absolute 02/19/2018 0 08     Basophils Absolute 02/19/2018 0 04     Sodium 02/19/2018 140     Potassium 02/19/2018 3 7     Chloride 02/19/2018 105     CO2 02/19/2018 28     Anion Gap 02/19/2018 7     BUN 02/19/2018 17     Creatinine 02/19/2018 0 78     Glucose, Fasting 02/19/2018 103*    Calcium 02/19/2018 9 0     AST 02/19/2018 10     ALT 02/19/2018 15     Alkaline Phosphatase 02/19/2018 61     Total Protein 02/19/2018 7 4     Albumin 02/19/2018 3 4*    Total Bilirubin 02/19/2018 0 31     eGFR 02/19/2018 87     CRP, High Sensitivity 02/19/2018 39 90    Admission on 01/03/2018, Discharged on 01/03/2018   Component Date Value    Case Report 01/03/2018                      Value:Surgical Pathology Report                         Case: [id number]                                   Authorizing Provider:  Sahara Gregorio MD         Collected:           01/03/2018 0752              Ordering Location:     [facility]        Received:            01/03/2018 [address] [facility] Endoscopy Pathologist:           Veronica Abdi MD                                                                Specimen:    Stomach, Gastric biopsy r/o h pylori                                                       Final Diagnosis 01/03/2018                      Value: This result contains rich text formatting which cannot be displayed here   Note 01/03/2018                      Value: This result contains rich text formatting which cannot be displayed here   Additional Information 01/03/2018                      Value: This result contains rich text formatting which cannot be displayed here  Nellie Kumar Gross Description 01/03/2018                      Value: This result contains rich text formatting which cannot be displayed here   Clinical Information 01/03/2018                      Value:Bariatric surgery status     Imaging: Xr Chest Pa & Lateral    Result Date: 2/19/2018  Narrative: CHEST INDICATION: 48-year-old female, leukocytosis COMPARISON: None VIEWS:  Frontal and lateral projections; 2 images FINDINGS: The lungs are clear  No pleural effusions  The cardiomediastinal silhouette is unremarkable  Bony thorax unremarkable  Left epigastric catheter     Impression: No acute cardiopulmonary disease Workstation performed: [id number]     Mri Knee Right  Wo Contrast    Addendum Date: 2/12/2018 Addendum:   Differential for posterolateral lobulated T2 hyperintense signal includes age-indeterminate proximal plantaris rupture, chronic hematoma, or pericapsular ganglion  Result Date: 2/12/2018  Narrative: MRI RIGHT KNEE INDICATION:  M17 11: Unilateral primary osteoarthritis, right knee  History taken directly from the electronic ordering system  COMPARISON: Right knee radiographs 1/12/2018 TECHNIQUE:    MR sequences were obtained of the right knee including:  Localizer, axial T2 fat sat, coronal T1/T2 fat sat, sagittal PD/T2 fat sat    Images were acquired on a 1 5 Meenu unit   Gadolinium was not used  FINDINGS: SUBCUTANEOUS TISSUES: Normal JOINT EFFUSION: There is a moderate joint effusion with debris  Minimal capsular thickening and synovial hypertrophy  BAKER'S CYST: Trace fluid in the semimembranosus medial gastrocnemius bursa  No distended Baker's cyst  MENISCI: Longitudinal tear tibial surface posterior horn and body lateral meniscus  Complex tear posterior horn and body medial meniscus extending to the posterior root attachment and anterior junction with the anterior horn  CRUCIATE LIGAMENTS: Intact  EXTENSOR APPARATUS: Minimal distal quadriceps enthesopathy  Otherwise intact  COLLATERAL LIGAMENTS: Mild sprain medial collateral ligament  No full thickness rupture  Lateral collateral ligament complex is intact  ARTICULAR SURFACES: Mild to moderate partial-thickness chondral thinning in the medial and lateral compartments  Small focal full-thickness chondral deficiency overlying the mid to upper lateral patellar facet  Tiny trochlear chondral fissure just lateral to midline  7 x 6 x 5 mm probable degenerative osteochondral lesion median eminence of the patella  BONE MARROW: Moderate edema posterior medial and lateral tibial plateaus and anteromedial proximal fibula  Minimal edema posterior annular medial femoral condyle  No fracture or dislocation  Punctate subcortical cysts inner anterior lateral tibial plateau  Minimal and edema peripheral medial tibial plateau  MUSCULATURE:  3 3 x 1 x 3 4 cm posterior lateral lobulated T2 hyperintensity posterior lateral soft tissues lateral to the popliteal vessels  Etiology uncertain  It may represent chronic hematoma or pericapsular ganglion  Musculature otherwise intact  Impression: Complex tear posterior horn and body medial meniscus  Longitudinal tear posterior horn and body lateral meniscus  Tricompartmental arthropathy out of proportion to bony hypertrophy  This may be sequela of chronic inflammatory arthropathy  Moderate reactive edema in the posterior medial tibial plateaus, proximal fibula, and posterior medial femoral condyle  7 x 6 x 5 mm probable chronic degenerative osteochondral lesion median eminence of the patella  No secondary features of instability  Moderate joint effusion with debris, synovial hypertrophy, and capsular thickening  Mild sprain medial collateral ligament  Cruciate and collateral ligaments otherwise intact  Workstation performed: [id number]       Review of Systems:  Review of Systems   Constitutional: Negative for activity change and unexpected weight change  Respiratory: Negative for chest tightness, shortness of breath, wheezing and stridor  Cardiovascular: Negative for chest pain, palpitations and leg swelling  Gastrointestinal: Negative for abdominal pain  Musculoskeletal: Positive for arthralgias  Negative for neck pain  Skin: Negative for pallor  Allergic/Immunologic: Negative for immunocompromised state  Neurological: Negative for dizziness, syncope and weakness  Hematological: Does not bruise/bleed easily  Physical Exam:  Physical Exam   Constitutional: She is oriented to person, place, and time  She appears well-developed  No distress  HENT:   Head: Normocephalic  Eyes: No scleral icterus  Neck: No JVD present  Cardiovascular: Normal rate, regular rhythm, normal heart sounds and intact distal pulses  Exam reveals no gallop and no friction rub  No murmur heard  Pulmonary/Chest: Effort normal and breath sounds normal  No respiratory distress  She has no wheezes  She has no rales  She exhibits no tenderness  Neurological: She is alert and oriented to person, place, and time  Skin: Skin is warm and dry  She is not diaphoretic  Psychiatric: She has a normal mood and affect  Discussion/Summary:  Preoperative cardiac clearance, borderline abnormal EKG  Nonspecific  Favor stress test   The patient thinks he can do the treadmill    Clearance pending the results of the testing  Anticipate to be unrevealing

## 2018-02-27 ENCOUNTER — ANESTHESIA EVENT (OUTPATIENT)
Dept: PERIOP | Facility: HOSPITAL | Age: 53
End: 2018-02-27

## 2018-02-27 RX ORDER — SODIUM CHLORIDE 9 MG/ML
125 INJECTION, SOLUTION INTRAVENOUS CONTINUOUS
Status: CANCELLED | OUTPATIENT
Start: 2018-02-27

## 2018-02-28 ENCOUNTER — HOSPITAL ENCOUNTER (OUTPATIENT)
Dept: NON INVASIVE DIAGNOSTICS | Facility: CLINIC | Age: 53
Discharge: HOME | End: 2018-02-28
Payer: COMMERCIAL

## 2018-02-28 ENCOUNTER — LAB (OUTPATIENT)
Dept: LAB | Facility: HOSPITAL | Age: 53
End: 2018-02-28
Payer: COMMERCIAL

## 2018-02-28 ENCOUNTER — TELEPHONE (OUTPATIENT)
Dept: FAMILY MEDICINE CLINIC | Facility: CLINIC | Age: 53
End: 2018-02-28

## 2018-02-28 ENCOUNTER — APPOINTMENT (OUTPATIENT)
Dept: PREADMISSION TESTING | Facility: HOSPITAL | Age: 53
End: 2018-02-28
Payer: COMMERCIAL

## 2018-02-28 DIAGNOSIS — R94.31 ABNORMAL EKG: ICD-10-CM

## 2018-02-28 DIAGNOSIS — Z01.810 PREOP CARDIOVASCULAR EXAM: ICD-10-CM

## 2018-02-28 DIAGNOSIS — D72.829 LEUKOCYTOSIS, UNSPECIFIED TYPE: ICD-10-CM

## 2018-02-28 LAB
BASOPHILS # BLD AUTO: 0.07 THOUSANDS/ΜL (ref 0–0.1)
BASOPHILS NFR BLD AUTO: 1 % (ref 0–1)
EOSINOPHIL # BLD AUTO: 0.02 THOUSAND/ΜL (ref 0–0.61)
EOSINOPHIL NFR BLD AUTO: 0 % (ref 0–6)
ERYTHROCYTE [DISTWIDTH] IN BLOOD BY AUTOMATED COUNT: 15.3 % (ref 11.6–15.1)
HCT VFR BLD AUTO: 41.2 % (ref 34.8–46.1)
HGB BLD-MCNC: 13.6 G/DL (ref 11.5–15.4)
LYMPHOCYTES # BLD AUTO: 1.68 THOUSANDS/ΜL (ref 0.6–4.47)
LYMPHOCYTES NFR BLD AUTO: 11 % (ref 14–44)
MCH RBC QN AUTO: 27.6 PG (ref 26.8–34.3)
MCHC RBC AUTO-ENTMCNC: 33 G/DL (ref 31.4–37.4)
MCV RBC AUTO: 84 FL (ref 82–98)
MONOCYTES # BLD AUTO: 0.51 THOUSAND/ΜL (ref 0.17–1.22)
MONOCYTES NFR BLD AUTO: 3 % (ref 4–12)
NEUTROPHILS # BLD AUTO: 12.85 THOUSANDS/ΜL (ref 1.85–7.62)
NEUTS SEG NFR BLD AUTO: 85 % (ref 43–75)
NRBC BLD AUTO-RTO: 0 /100 WBCS
PLATELET # BLD AUTO: 591 THOUSANDS/UL (ref 149–390)
PMV BLD AUTO: 9.9 FL (ref 8.9–12.7)
RBC # BLD AUTO: 4.92 MILLION/UL (ref 3.81–5.12)
WBC # BLD AUTO: 15.13 THOUSAND/UL (ref 4.31–10.16)

## 2018-02-28 PROCEDURE — 93018 CV STRESS TEST I&R ONLY: CPT | Performed by: INTERNAL MEDICINE

## 2018-02-28 PROCEDURE — 85025 COMPLETE CBC W/AUTO DIFF WBC: CPT

## 2018-02-28 PROCEDURE — 93016 CV STRESS TEST SUPVJ ONLY: CPT | Performed by: INTERNAL MEDICINE

## 2018-02-28 PROCEDURE — 93017 CV STRESS TEST TRACING ONLY: CPT

## 2018-02-28 PROCEDURE — 36415 COLL VENOUS BLD VENIPUNCTURE: CPT

## 2018-02-28 RX ORDER — SODIUM CHLORIDE 9 MG/ML
125 INJECTION, SOLUTION INTRAVENOUS CONTINUOUS
Status: CANCELLED | OUTPATIENT
Start: 2018-02-28

## 2018-02-28 NOTE — ANESTHESIA PREPROCEDURE EVALUATION
Review of Systems/Medical History  Patient summary reviewed  Chart reviewed  No history of anesthetic complications     Cardiovascular  Hypertension controlled,    Pulmonary  Asthma: well controlled/ stable Last rescue: today Asthma type of rescue: PRN inhaler, Sleep apnea CPAP,        GI/Hepatic    GERD , Bariatric surgery,             Endo/Other    Obesity  morbid obesity   GYN  Negative gynecology ROS          Hematology  Negative hematology ROS      Musculoskeletal  Rheumatoid arthritis ,   Arthritis     Neurology  Negative neurology ROS      Psychology   Anxiety, Depression , being treated for depression,     Comment: 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         Physical Exam    Airway    Mallampati score: II  TM Distance: >3 FB  Neck ROM: full     Dental   No notable dental hx     Cardiovascular  Rhythm: regular, Rate: normal, Cardiovascular exam normal    Pulmonary  Pulmonary exam normal Breath sounds clear to auscultation,     Other Findings        Anesthesia Plan  ASA Score- 3     Anesthesia Type- general with ASA Monitors  Additional Monitors:   Airway Plan: ETT  Plan Factors-Patient not instructed to abstain from smoking on day of procedure  Patient did not smoke on day of surgery  Induction- intravenous  Postoperative Plan- Plan for postoperative opioid use  Informed Consent- Anesthetic plan and risks discussed with patient  I personally reviewed this patient with the CRNA  Discussed and agreed on the Anesthesia Plan with the CRNA  Omar Lind

## 2018-02-28 NOTE — TELEPHONE ENCOUNTER
Patient will like to know why her cbc is up and why endo needs to be contacted   Patient will like Dr Kiera Lassiter to give her a call at number on file       ----- Message from Boo Jones MD sent at 2/28/2018  5:44 PM EST -----  Cbc improving but still up-will contact ortho tomorrow

## 2018-03-01 ENCOUNTER — TELEPHONE (OUTPATIENT)
Dept: OBGYN CLINIC | Facility: MEDICAL CENTER | Age: 53
End: 2018-03-01

## 2018-03-01 DIAGNOSIS — D72.819 LEUKOPENIA, UNSPECIFIED TYPE: Primary | ICD-10-CM

## 2018-03-01 LAB
ARRHY DURING EX: NORMAL
CHEST PAIN STATEMENT: NORMAL
MAX DIASTOLIC BP: 90 MMHG
MAX HEART RATE: 160 BPM
MAX PREDICTED HEART RATE: 167 BPM
MAX. SYSTOLIC BP: 170 MMHG
PROTOCOL NAME: NORMAL
REASON FOR TERMINATION: NORMAL
TARGET HR FORMULA: NORMAL
TEST INDICATION: NORMAL
TIME IN EXERCISE PHASE: NORMAL

## 2018-03-01 NOTE — TELEPHONE ENCOUNTER
Notified rx was ready for   Patient stated she did not need the rx since she was going to get the labs done at a [facility] facility  I told her just to be on the safe side I suggested to it  pick  up and she said she had done this before and she never had any problems

## 2018-03-02 ENCOUNTER — TELEPHONE (OUTPATIENT)
Dept: FAMILY MEDICINE CLINIC | Facility: CLINIC | Age: 53
End: 2018-03-02

## 2018-03-02 ENCOUNTER — TELEPHONE (OUTPATIENT)
Dept: CARDIOLOGY CLINIC | Facility: CLINIC | Age: 53
End: 2018-03-02

## 2018-03-02 ENCOUNTER — APPOINTMENT (OUTPATIENT)
Dept: LAB | Facility: HOSPITAL | Age: 53
End: 2018-03-02
Payer: COMMERCIAL

## 2018-03-02 DIAGNOSIS — D72.819 LEUKOPENIA, UNSPECIFIED TYPE: ICD-10-CM

## 2018-03-02 LAB
BASOPHILS # BLD AUTO: 0.03 THOUSANDS/ΜL (ref 0–0.1)
BASOPHILS NFR BLD AUTO: 0 % (ref 0–1)
EOSINOPHIL # BLD AUTO: 0.01 THOUSAND/ΜL (ref 0–0.61)
EOSINOPHIL NFR BLD AUTO: 0 % (ref 0–6)
ERYTHROCYTE [DISTWIDTH] IN BLOOD BY AUTOMATED COUNT: 15.1 % (ref 11.6–15.1)
HCT VFR BLD AUTO: 39.3 % (ref 34.8–46.1)
HGB BLD-MCNC: 13.1 G/DL (ref 11.5–15.4)
LYMPHOCYTES # BLD AUTO: 0.87 THOUSANDS/ΜL (ref 0.6–4.47)
LYMPHOCYTES NFR BLD AUTO: 8 % (ref 14–44)
MCH RBC QN AUTO: 27.6 PG (ref 26.8–34.3)
MCHC RBC AUTO-ENTMCNC: 33.3 G/DL (ref 31.4–37.4)
MCV RBC AUTO: 83 FL (ref 82–98)
MONOCYTES # BLD AUTO: 0.62 THOUSAND/ΜL (ref 0.17–1.22)
MONOCYTES NFR BLD AUTO: 5 % (ref 4–12)
NEUTROPHILS # BLD AUTO: 10.01 THOUSANDS/ΜL (ref 1.85–7.62)
NEUTS SEG NFR BLD AUTO: 87 % (ref 43–75)
NRBC BLD AUTO-RTO: 0 /100 WBCS
PLATELET # BLD AUTO: 453 THOUSANDS/UL (ref 149–390)
PMV BLD AUTO: 9.6 FL (ref 8.9–12.7)
RBC # BLD AUTO: 4.74 MILLION/UL (ref 3.81–5.12)
WBC # BLD AUTO: 11.54 THOUSAND/UL (ref 4.31–10.16)

## 2018-03-02 PROCEDURE — 85025 COMPLETE CBC W/AUTO DIFF WBC: CPT

## 2018-03-02 PROCEDURE — 36415 COLL VENOUS BLD VENIPUNCTURE: CPT

## 2018-03-02 NOTE — TELEPHONE ENCOUNTER
NOTIFIED Pt     ----- Message from Jt Gaona MD sent at 3/2/2018  1:41 PM EST -----  Blood count almost nl but not totally-ortho might cancel surgery, she should talk to them, I would be inclined to repeat blood count Monday am and see if back to normal by then but surgery is Tuesday so I defer to ortho

## 2018-03-05 ENCOUNTER — APPOINTMENT (OUTPATIENT)
Dept: LAB | Facility: HOSPITAL | Age: 53
End: 2018-03-05
Payer: COMMERCIAL

## 2018-03-05 ENCOUNTER — TELEPHONE (OUTPATIENT)
Dept: FAMILY MEDICINE CLINIC | Facility: CLINIC | Age: 53
End: 2018-03-05

## 2018-03-05 DIAGNOSIS — D72.829 LEUKOCYTOSIS, UNSPECIFIED TYPE: Primary | ICD-10-CM

## 2018-03-05 LAB
BASOPHILS # BLD AUTO: 0.03 THOUSANDS/ΜL (ref 0–0.1)
BASOPHILS NFR BLD AUTO: 0 % (ref 0–1)
EOSINOPHIL # BLD AUTO: 0.01 THOUSAND/ΜL (ref 0–0.61)
EOSINOPHIL NFR BLD AUTO: 0 % (ref 0–6)
ERYTHROCYTE [DISTWIDTH] IN BLOOD BY AUTOMATED COUNT: 15.6 % (ref 11.6–15.1)
HCT VFR BLD AUTO: 38.2 % (ref 34.8–46.1)
HGB BLD-MCNC: 12.3 G/DL (ref 11.5–15.4)
LYMPHOCYTES # BLD AUTO: 1.43 THOUSANDS/ΜL (ref 0.6–4.47)
LYMPHOCYTES NFR BLD AUTO: 11 % (ref 14–44)
MCH RBC QN AUTO: 27.2 PG (ref 26.8–34.3)
MCHC RBC AUTO-ENTMCNC: 32.2 G/DL (ref 31.4–37.4)
MCV RBC AUTO: 84 FL (ref 82–98)
MONOCYTES # BLD AUTO: 0.4 THOUSAND/ΜL (ref 0.17–1.22)
MONOCYTES NFR BLD AUTO: 3 % (ref 4–12)
NEUTROPHILS # BLD AUTO: 11.32 THOUSANDS/ΜL (ref 1.85–7.62)
NEUTS SEG NFR BLD AUTO: 86 % (ref 43–75)
NRBC BLD AUTO-RTO: 0 /100 WBCS
PLATELET # BLD AUTO: 437 THOUSANDS/UL (ref 149–390)
PMV BLD AUTO: 9.8 FL (ref 8.9–12.7)
RBC # BLD AUTO: 4.53 MILLION/UL (ref 3.81–5.12)
WBC # BLD AUTO: 13.19 THOUSAND/UL (ref 4.31–10.16)

## 2018-03-05 PROCEDURE — 85025 COMPLETE CBC W/AUTO DIFF WBC: CPT | Performed by: FAMILY MEDICINE

## 2018-03-05 PROCEDURE — 36415 COLL VENOUS BLD VENIPUNCTURE: CPT | Performed by: FAMILY MEDICINE

## 2018-03-05 NOTE — TELEPHONE ENCOUNTER
Called susan to inform her Dr Jose Lawrence submitted the order and Kolby Colon was already notified  Patient is currently on her way back to the hospital to get her labs done

## 2018-03-05 NOTE — TELEPHONE ENCOUNTER
Kristen from [facility] called in asking if Castro Forget had her CBC rechecked  Patient is scheduled for surgery tomorrow in the morning and she needs to get this lab done  I informed her we are waiting for Dr Juanpablo Cheung to put orders in  Then we will be calling patient to ask her is she is still doing her lab today  Chucho Johns will like us to keep her posted on patient's lab

## 2018-03-06 ENCOUNTER — TELEPHONE (OUTPATIENT)
Dept: FAMILY MEDICINE CLINIC | Facility: CLINIC | Age: 53
End: 2018-03-06

## 2018-03-06 ENCOUNTER — ANESTHESIA (OUTPATIENT)
Dept: PERIOP | Facility: HOSPITAL | Age: 53
End: 2018-03-06

## 2018-03-08 ENCOUNTER — TELEPHONE (OUTPATIENT)
Dept: OBGYN CLINIC | Facility: MEDICAL CENTER | Age: 53
End: 2018-03-08

## 2018-03-08 ENCOUNTER — OFFICE VISIT (OUTPATIENT)
Dept: FAMILY MEDICINE CLINIC | Facility: CLINIC | Age: 53
End: 2018-03-08
Payer: COMMERCIAL

## 2018-03-08 VITALS
RESPIRATION RATE: 18 BRPM | HEIGHT: 61 IN | TEMPERATURE: 99.7 F | WEIGHT: 213.13 LBS | SYSTOLIC BLOOD PRESSURE: 152 MMHG | DIASTOLIC BLOOD PRESSURE: 98 MMHG | BODY MASS INDEX: 40.24 KG/M2 | HEART RATE: 108 BPM

## 2018-03-08 DIAGNOSIS — M25.512 ARTHRALGIA OF BOTH ACROMIOCLAVICULAR JOINTS: Primary | ICD-10-CM

## 2018-03-08 DIAGNOSIS — D72.829 LEUKOCYTOSIS, UNSPECIFIED TYPE: ICD-10-CM

## 2018-03-08 DIAGNOSIS — I88.9 LYMPHADENITIS: ICD-10-CM

## 2018-03-08 DIAGNOSIS — M25.511 ARTHRALGIA OF BOTH ACROMIOCLAVICULAR JOINTS: Primary | ICD-10-CM

## 2018-03-08 PROBLEM — S83.281A: Status: RESOLVED | Noted: 2018-02-16 | Resolved: 2018-03-08

## 2018-03-08 PROBLEM — M89.8X1 CLAVICLE PAIN: Status: RESOLVED | Noted: 2017-11-15 | Resolved: 2018-03-08

## 2018-03-08 PROCEDURE — 99214 OFFICE O/P EST MOD 30 MIN: CPT | Performed by: FAMILY MEDICINE

## 2018-03-08 PROCEDURE — 87070 CULTURE OTHR SPECIMN AEROBIC: CPT | Performed by: FAMILY MEDICINE

## 2018-03-08 RX ORDER — AMOXICILLIN AND CLAVULANATE POTASSIUM 875; 125 MG/1; MG/1
1 TABLET, FILM COATED ORAL EVERY 12 HOURS SCHEDULED
Qty: 20 TABLET | Refills: 0 | Status: SHIPPED | OUTPATIENT
Start: 2018-03-08 | End: 2018-03-18

## 2018-03-08 NOTE — ASSESSMENT & PLAN NOTE
Bone scan due to clavicular pain, could be prednisone effect but unable to get off meds due to pain,repeat CBC  1week

## 2018-03-08 NOTE — PROGRESS NOTES
Assessment/Plan:    Leukocytosis  Bone scan due to clavicular pain, could be prednisone effect but unable to get off meds due to pain,repeat CBC  1week       Diagnoses and all orders for this visit:    Arthralgia of both acromioclavicular joints    Lymphadenitis    Leukocytosis, unspecified type          Subjective:      Patient ID: Alex Cantu is a 47 y o  female  URI    This is a new problem  The current episode started in the past 7 days  The problem has been waxing and waning  The maximum temperature recorded prior to her arrival was 100 4 - 100 9 F  The fever has been present for 5 days or more  Associated symptoms include congestion, joint pain, rhinorrhea and sinus pain  Pertinent negatives include no chest pain or swollen glands  She has tried acetaminophen for the symptoms  The treatment provided mild relief  The following portions of the patient's history were reviewed and updated as appropriate: allergies, current medications, past family history, past medical history, past social history, past surgical history and problem list     Review of Systems   HENT: Positive for congestion, rhinorrhea and sinus pain  Respiratory: Negative for shortness of breath  Cardiovascular: Negative for chest pain  Musculoskeletal: Positive for arthralgias and joint pain  Pain in collarbones   Hematological: Negative for adenopathy  Objective:      /98 (BP Location: Right arm, Patient Position: Sitting, Cuff Size: Adult)   Pulse (!) 108   Temp 99 7 °F (37 6 °C) (Temporal)   Resp 18   Ht 5' 1" (1 549 m)   Wt 96 7 kg (213 lb 2 oz)   BMI 40 27 kg/m²          Physical Exam   Constitutional: She appears well-developed and well-nourished  HENT:   Right Ear: Tympanic membrane normal    Left Ear: Tympanic membrane normal    Nose: No mucosal edema or rhinorrhea  Right sinus exhibits no maxillary sinus tenderness and no frontal sinus tenderness   Left sinus exhibits no maxillary sinus tenderness and no frontal sinus tenderness  Mouth/Throat: Oropharyngeal exudate and posterior oropharyngeal edema present  Cardiovascular: Normal rate and regular rhythm  Pulmonary/Chest: Breath sounds normal    Lymphadenopathy:     She has cervical adenopathy

## 2018-03-08 NOTE — TELEPHONE ENCOUNTER
I spoke with the patient's PCP today  He states the patient is on 10 mg of prednisone twice a day  She states this is contributing to her increase in WBC  She states that she may not be able to get them back to normal for surgery  The might always the 11 or 12  She states the patient is also having a low-grade temperature and some swollen lymph nodes  She is doing a throat culture and putting the patient on Augmentin  She will repeat a WBC on Monday  If the patient is feeling better and afebrile she is okay with her pursuing surgery

## 2018-03-09 ENCOUNTER — TELEPHONE (OUTPATIENT)
Dept: FAMILY MEDICINE CLINIC | Facility: CLINIC | Age: 53
End: 2018-03-09

## 2018-03-10 LAB — BACTERIA THROAT CULT: NORMAL

## 2018-03-12 ENCOUNTER — TELEPHONE (OUTPATIENT)
Dept: FAMILY MEDICINE CLINIC | Facility: CLINIC | Age: 53
End: 2018-03-12

## 2018-03-12 ENCOUNTER — APPOINTMENT (OUTPATIENT)
Dept: LAB | Facility: HOSPITAL | Age: 53
End: 2018-03-12
Payer: COMMERCIAL

## 2018-03-12 ENCOUNTER — HOSPITAL ENCOUNTER (OUTPATIENT)
Dept: NUCLEAR MEDICINE | Facility: HOSPITAL | Age: 53
Discharge: HOME | End: 2018-03-12
Payer: COMMERCIAL

## 2018-03-12 DIAGNOSIS — M25.512 ARTHRALGIA OF BOTH ACROMIOCLAVICULAR JOINTS: ICD-10-CM

## 2018-03-12 DIAGNOSIS — D72.829 LEUKOCYTOSIS, UNSPECIFIED TYPE: Primary | ICD-10-CM

## 2018-03-12 DIAGNOSIS — D72.829 LEUKOCYTOSIS, UNSPECIFIED TYPE: ICD-10-CM

## 2018-03-12 DIAGNOSIS — M25.511 ARTHRALGIA OF BOTH ACROMIOCLAVICULAR JOINTS: ICD-10-CM

## 2018-03-12 DIAGNOSIS — I88.9 LYMPHADENITIS: ICD-10-CM

## 2018-03-12 LAB
BASOPHILS # BLD AUTO: 0.04 THOUSANDS/ΜL (ref 0–0.1)
BASOPHILS NFR BLD AUTO: 0 % (ref 0–1)
EOSINOPHIL # BLD AUTO: 0.07 THOUSAND/ΜL (ref 0–0.61)
EOSINOPHIL NFR BLD AUTO: 0 % (ref 0–6)
ERYTHROCYTE [DISTWIDTH] IN BLOOD BY AUTOMATED COUNT: 16 % (ref 11.6–15.1)
HCT VFR BLD AUTO: 39.6 % (ref 34.8–46.1)
HGB BLD-MCNC: 12.9 G/DL (ref 11.5–15.4)
LYMPHOCYTES # BLD AUTO: 1.03 THOUSANDS/ΜL (ref 0.6–4.47)
LYMPHOCYTES NFR BLD AUTO: 7 % (ref 14–44)
MCH RBC QN AUTO: 27.6 PG (ref 26.8–34.3)
MCHC RBC AUTO-ENTMCNC: 32.6 G/DL (ref 31.4–37.4)
MCV RBC AUTO: 85 FL (ref 82–98)
MONOCYTES # BLD AUTO: 0.6 THOUSAND/ΜL (ref 0.17–1.22)
MONOCYTES NFR BLD AUTO: 4 % (ref 4–12)
NEUTROPHILS # BLD AUTO: 13.84 THOUSANDS/ΜL (ref 1.85–7.62)
NEUTS SEG NFR BLD AUTO: 89 % (ref 43–75)
NRBC BLD AUTO-RTO: 0 /100 WBCS
PLATELET # BLD AUTO: 448 THOUSANDS/UL (ref 149–390)
PMV BLD AUTO: 9.2 FL (ref 8.9–12.7)
RBC # BLD AUTO: 4.68 MILLION/UL (ref 3.81–5.12)
WBC # BLD AUTO: 15.58 THOUSAND/UL (ref 4.31–10.16)

## 2018-03-12 PROCEDURE — 85025 COMPLETE CBC W/AUTO DIFF WBC: CPT

## 2018-03-12 PROCEDURE — A9503 TC99M MEDRONATE: HCPCS

## 2018-03-12 PROCEDURE — 36415 COLL VENOUS BLD VENIPUNCTURE: CPT

## 2018-03-12 PROCEDURE — 78320 HB BONE IMAGING (3D): CPT

## 2018-03-12 NOTE — TELEPHONE ENCOUNTER
Pt notified of results and recommendations     ----- Message from Darin Evans MD sent at 3/12/2018  5:36 PM EDT -----  No bone infection seen at [facility] or elsewhere, await hematology eval

## 2018-03-12 NOTE — TELEPHONE ENCOUNTER
NOTIFIED PT    ----- Message from Jt Gaona MD sent at 3/12/2018  1:45 PM EDT -----  White cells higher--rec hematology eval

## 2018-03-12 NOTE — TELEPHONE ENCOUNTER
----- Message from Reji Bolaños MD sent at 3/12/2018  1:45 PM EDT -----  White cells higher--rec hematology eval

## 2018-03-12 NOTE — TELEPHONE ENCOUNTER
NOTIFIED PT    ----- Message from Alvaro Dumont MD sent at 3/11/2018  2:16 PM EDT -----  Throat cx nl

## 2018-03-22 ENCOUNTER — OFFICE VISIT (OUTPATIENT)
Dept: HEMATOLOGY ONCOLOGY | Facility: CLINIC | Age: 53
End: 2018-03-22
Payer: COMMERCIAL

## 2018-03-22 VITALS
SYSTOLIC BLOOD PRESSURE: 122 MMHG | OXYGEN SATURATION: 100 % | RESPIRATION RATE: 18 BRPM | WEIGHT: 207.8 LBS | HEART RATE: 97 BPM | HEIGHT: 61 IN | DIASTOLIC BLOOD PRESSURE: 88 MMHG | BODY MASS INDEX: 39.23 KG/M2 | TEMPERATURE: 98.7 F

## 2018-03-22 DIAGNOSIS — D47.1 MYELOPROLIFERATIVE DISEASE (HCC): ICD-10-CM

## 2018-03-22 DIAGNOSIS — D72.829 LEUKOCYTOSIS, UNSPECIFIED TYPE: Primary | ICD-10-CM

## 2018-03-22 PROCEDURE — 99244 OFF/OP CNSLTJ NEW/EST MOD 40: CPT | Performed by: PHYSICIAN ASSISTANT

## 2018-03-22 NOTE — PROGRESS NOTES
Hematology/Oncology Outpatient Consult  Seb Armstrong 47 y o  female 1965 [id number]    Date:  3/22/2018      Assessment and Plan:  1  Leukocytosis, unspecified type  Will rule out underlying myeloproliferative disorder due to elevated WBC and platelets  Could also be related to inflammatory disorder and prednisone use  - CBC and differential; Future  - US abdomen complete; Future    2  Myeloproliferative disease ([address] )  Due to elevated WBC and platelets, need to rule out underlying myeloproliferative disorder  She will have labs below tomorrow and follow up in 2 weeks  - BCR/ABL, PCR; Future  - Leukemia/Lymphoma flow cytometry; Future  - LD,Blood; Future  - Uric acid; Future  - Comprehensive metabolic panel; Future  - CHARITY 2; Future      HPI:  47year old female presents for consultation for leukocytosis  This has been present since Feb 2018  Please refer to labs at end of the note  She has rheumatoid arthritis  She follows with Dr Luiz Daniel at [facility]  Mostly affects the shoulders and knees  She is on prednisone 10 mg BID for the past 2 weeks  She has been on prednisone for about a year  She was diagnosed with RA about a year ago  She saw Dr Luiz Daniel today  He plans to decrease to 5 mg PO BID, starting tomorrow  After on lower dose of prednisone, for 4 weeks he plans to recheck sed rate and CRP  He is also on Daypro for right knee pain  She has 2 meniscus tears  She was scheduled for arthroscopy in March 2018 with Dr Marla Mendes was but cancelled due to leukocytosis  She has lap band that was placed in 2010/2011  She is planning to have gastric bypass, removal of lap band, and correction of hernia with Dr Steff Lucio  Smoking history- smokes on average 2 cigarettes per day for the past 2 years  Social alcohol use      3/8/18 at PCP weight was 213, today 207  She has not been eating as much  Interval history:    Increase in fatigue for the past 1 year   Decrease in appetite for about 1 year    ROS: Review of Systems   Constitutional: Positive for fatigue  Negative for appetite change, chills, fever and unexpected weight change  Denies night sweats    HENT: Negative for mouth sores and nosebleeds  Respiratory: Negative for cough, chest tightness, shortness of breath and wheezing  Cardiovascular: Negative for chest pain, palpitations and leg swelling  Gastrointestinal: Negative for abdominal pain, blood in stool, constipation, diarrhea, nausea and vomiting  Genitourinary: Negative for difficulty urinating, dysuria and hematuria  Musculoskeletal: Positive for arthralgias (knees, shoulders)  Negative for joint swelling and myalgias  Skin: Negative  Neurological: Negative for dizziness, weakness, light-headedness, numbness and headaches  Hematological: Negative  Psychiatric/Behavioral: Negative  Past Medical History:   Diagnosis Date    Abdominal hernia     Anxiety     Arthritis     o a  left knee    Asthma     Brain injury ([facility] )     Short term memory problems; confusion; has affected depth perception    CPAP (continuous positive airway pressure) dependence     Depression     GERD (gastroesophageal reflux disease)     History of gestational diabetes     Was on insulin    Hypertension     Knee pain, right     Obese     Pneumonia     x1    [facility]     Sleep apnea     Vomiting        Past Surgical History:   Procedure Laterality Date    ANAL FISSURECTOMY       SECTION      x1    ELBOW SURGERY Left     FOOT SURGERY Bilateral     5th toes    LAPAROSCOPIC GASTRIC BANDING  2010    Dr Sylwia Allen, Dia Arzola    [place] EGD TRANSORAL BIOPSY SINGLE/MULTIPLE N/A 1/3/2018    Procedure: ESOPHAGOGASTRODUODENOSCOPY (EGD) with biopsy;  Surgeon: Baldemar Jacobson MD;  Location: [facility];   Service: Bariatrics       Social History     Social History    Marital status: Single     Spouse name: N/A    Number of children: 1    Years of education: N/A     Occupational History    Unemployed "Looking for work"     Disabled      Social History Main Topics    Smoking status: Current Every Day Smoker     Packs/day: 0 15     Years: 3 00     Types: Cigarettes    Smokeless tobacco: Never Used      Comment: 1 ppd as per allscripts    Alcohol use Yes      Comment: Holidays  // stopped drinking alcohol as per allscripts    Drug use: No    Sexual activity: Yes     Partners: Female     Other Topics Concern    Not on file     Social History Narrative    One child    Lives in house with partner    Disabled    Advanced directive information unavailable    Domestic partner    House           Family History   Problem Relation Age of Onset    Diabetes type II Mother     Hypertension Mother     Arthritis Mother     Congenital heart disease Mother     Stroke Mother     Diabetes type II Father     Hypertension Father     Asthma Father     Sudden death Other 28     niece    Asthma Sister     Asthma Brother     Seizures Son     Asthma Maternal Aunt        Allergies   Allergen Reactions    Sulfasalazine Rash    Other Rash     Adhesive tape         Current Outpatient Prescriptions:     albuterol (2 5 mg/3 mL) 0 083 % nebulizer solution, Take 2 5 mg by nebulization every 6 (six) hours as needed for wheezing, Disp: , Rfl:     albuterol (PROVENTIL HFA,VENTOLIN HFA) 90 mcg/act inhaler, Inhale 2 puffs every 6 (six) hours as needed for wheezing, Disp: , Rfl:     budesonide-formoterol (SYMBICORT) 160-4 5 mcg/act inhaler, Inhale 2 puffs 2 (two) times a day, Disp: , Rfl:     buPROPion (WELLBUTRIN SR) 150 mg 12 hr tablet, Take 150 mg by mouth 2 (two) times a day, Disp: , Rfl:     fluticasone (FLONASE) 50 mcg/act nasal spray, 1 spray into each nostril as needed  , Disp: , Rfl:     losartan 50 mg TABS 100 mg, hydrochlorothiazide 25 mg TABS 25 mg, Take 1 tablet by mouth every morning  , Disp: , Rfl:     montelukast (SINGULAIR) 10 mg tablet, Take 10 mg by mouth daily at bedtime, Disp: , Rfl:     oxaprozin (DAYPRO) 600 MG tablet, Take 600 mg by mouth 2 (two) times a day, Disp: , Rfl:     predniSONE 10 mg tablet, Take 10 mg by mouth 2 (two) times a day, Disp: , Rfl:     spironolactone (ALDACTONE) 25 mg tablet, Take 25 mg by mouth as needed, Disp: , Rfl:     Tiotropium Bromide Monohydrate (SPIRIVA RESPIMAT) 1 25 MCG/ACT AERS, Inhale every morning  , Disp: , Rfl:       Physical Exam:  There were no vitals taken for this visit  Physical Exam   Constitutional: She is oriented to person, place, and time  She appears well-developed and well-nourished  No distress  Overweight      HENT:   Head: Normocephalic and atraumatic  Eyes: Conjunctivae are normal  No scleral icterus  Neck: Normal range of motion  Neck supple  Cardiovascular: Normal rate, regular rhythm and normal heart sounds  No murmur heard  Pulmonary/Chest: Effort normal and breath sounds normal  No respiratory distress  Abdominal: Soft  There is no tenderness  Palpation of port for lap band in left upper quadrant    Musculoskeletal: Normal range of motion  She exhibits no edema or tenderness  Lymphadenopathy:     She has no cervical adenopathy  Neurological: She is alert and oriented to person, place, and time  No cranial nerve deficit  Skin: Skin is warm and dry  Psychiatric: She has a normal mood and affect  Vitals reviewed          Labs:  Component      Latest Ref Rng & Units 2/19/2018 2/28/2018 3/2/2018 3/5/2018   WBC      4 31 - 10 16 Thousand/uL 16 89 (H) 15 13 (H) 11 54 (H) 13 19 (H)   RBC      3 81 - 5 12 Million/uL 4 57 4 92 4 74 4 53   Hemoglobin      11 5 - 15 4 g/dL 12 2 13 6 13 1 12 3   Hematocrit      34 8 - 46 1 % 38 6 41 2 39 3 38 2   MCV      82 - 98 fL 85 84 83 84   MCH      26 8 - 34 3 pg 26 7 (L) 27 6 27 6 27 2   MCHC      31 4 - 37 4 g/dL 31 6 33 0 33 3 32 2   RDW      11 6 - 15 1 % 15 6 (H) 15 3 (H) 15 1 15 6 (H)   MPV      8 9 - 12 7 fL 9 1 9 9 9 6 9 8 Platelets      632 - 390 Thousands/uL 578 (H) 591 (H) 453 (H) 437 (H)   nRBC      /100 WBCs 0 0 0 0   Neutrophils Relative      43 - 75 % 86 (H) 85 (H) 87 (H) 86 (H)   Lymphocytes Relative      14 - 44 % 8 (L) 11 (L) 8 (L) 11 (L)   Monocytes Relative      4 - 12 % 5 3 (L) 5 3 (L)   Eosinophils Relative      0 - 6 % 1 0 0 0   Basophils Relative      0 - 1 % 0 1 0 0   Neutrophils Absolute      1 85 - 7 62 Thousands/µL 14 63 (H) 12 85 (H) 10 01 (H) 11 32 (H)   Lymphocytes Absolute      0 60 - 4 47 Thousands/µL 1 27 1 68 0 87 1 43   Monocytes Absolute      0 17 - 1 22 Thousand/µL 0 82 0 51 0 62 0 40   Eosinophils Absolute      0 00 - 0 61 Thousand/µL 0 08 0 02 0 01 0 01   Basophils Absolute      0 00 - 0 10 Thousands/µL 0 04 0 07 0 03 0 03     Component      Latest Ref Rng & Units 3/12/2018   WBC      4 31 - 10 16 Thousand/uL 15 58 (H)   RBC      3 81 - 5 12 Million/uL 4 68   Hemoglobin      11 5 - 15 4 g/dL 12 9   Hematocrit      34 8 - 46 1 % 39 6   MCV      82 - 98 fL 85   MCH      26 8 - 34 3 pg 27 6   MCHC      31 4 - 37 4 g/dL 32 6   RDW      11 6 - 15 1 % 16 0 (H)   MPV      8 9 - 12 7 fL 9 2   Platelets      364 - 390 Thousands/uL 448 (H)   nRBC      /100 WBCs 0   Neutrophils Relative      43 - 75 % 89 (H)   Lymphocytes Relative      14 - 44 % 7 (L)   Monocytes Relative      4 - 12 % 4   Eosinophils Relative      0 - 6 % 0   Basophils Relative      0 - 1 % 0   Neutrophils Absolute      1 85 - 7 62 Thousands/µL 13 84 (H)   Lymphocytes Absolute      0 60 - 4 47 Thousands/µL 1 03   Monocytes Absolute      0 17 - 1 22 Thousand/µL 0 60   Eosinophils Absolute      0 00 - 0 61 Thousand/µL 0 07   Basophils Absolute      0 00 - 0 10 Thousands/µL 0 04     2/19/18  CRP high sensitivity, 39 9 (elevated)  CMP normal     12/21/17  WBC 8 9,   Hemoglobin 12 6,  Platelets 632    0/38/40  WBC 8 0,   Hemoglobin 12 9,    Platelets 291    3/15/58  WBC 11 7, ANC 9 3,  Hemoglobin 13 0 ,   Platelets 336    3/71/36  WBC 8 8, Hemoglobin 13 1,   Platelets 573       4/98/64  WBC 9 3,  Hemoglobin  12 6,  Platelets 549      23/33/02   WBC 9 6,  Hemoglobin 12 7,   Platelets 456      Patient voiced understanding and agreement in the above discussion  Aware to contact our office with questions/symptoms in the interim

## 2018-03-22 NOTE — LETTER
March 22, 2018     Louisa Fulton, [address] [address]    Patient: Denisa Simms   YOB: 1965   Date of Visit: 3/22/2018       Dear Dr Blanca Case:    Thank you for referring Kiya Durham to me for evaluation  Below are my notes for this consultation  If you have questions, please do not hesitate to call me  I look forward to following your patient along with you  Sincerely,        Francisca Finney PA-C        CC: DO Hannah Carlisle MD Dollie Lean, PA-C  3/22/2018  4:43 PM  Sign at close encounter  Hematology/Oncology Outpatient [address] [facility] 47 y o  female 1965 [id number]    Date:  3/22/2018      Assessment and Plan:  1  Leukocytosis, unspecified type  Will rule out underlying myeloproliferative disorder due to elevated WBC and platelets  Could also be related to inflammatory disorder and prednisone use  - CBC and differential; Future  - US abdomen complete; Future    2  Myeloproliferative disease ([facility] )  Due to elevated WBC and platelets, need to rule out underlying myeloproliferative disorder  She will have labs below tomorrow and follow up in 2 weeks  - BCR/ABL, PCR; Future  - Leukemia/Lymphoma flow cytometry; Future  - LD,Blood; Future  - Uric acid; Future  - Comprehensive metabolic panel; Future  - CHARITY 2; Future      HPI:  47year old female presents for consultation for leukocytosis  This has been present since Feb 2018  Please refer to labs at end of the note  She has rheumatoid arthritis  She follows with Dr Merari Bagley at [facility]  Mostly affects the shoulders and knees  She is on prednisone 10 mg BID for the past 2 weeks  She has been on prednisone for about a year  She was diagnosed with RA about a year ago  She saw Dr Merari Bagley today  He plans to decrease to 5 mg PO BID, starting tomorrow  After on lower dose of prednisone, for 4 weeks he plans to recheck sed rate and CRP       He is also on Daypro for right knee pain  She has 2 meniscus tears  She was scheduled for arthroscopy in March 2018 with Dr Edwige Lee was but cancelled due to leukocytosis  She has lap band that was placed in 2010/2011  She is planning to have gastric bypass, removal of lap band, and correction of hernia with Dr Leila Long  Smoking history- smokes on average 2 cigarettes per day for the past 2 years  Social alcohol use      3/8/18 at PCP weight was 213, today 207  She has not been eating as much  Interval history:    Increase in fatigue for the past 1 year  Decrease in appetite for about 1 year    ROS: Review of Systems   Constitutional: Positive for fatigue  Negative for appetite change, chills, fever and unexpected weight change  Denies night sweats    HENT: Negative for mouth sores and nosebleeds  Respiratory: Negative for cough, chest tightness, shortness of breath and wheezing  Cardiovascular: Negative for chest pain, palpitations and leg swelling  Gastrointestinal: Negative for abdominal pain, blood in stool, constipation, diarrhea, nausea and vomiting  Genitourinary: Negative for difficulty urinating, dysuria and hematuria  Musculoskeletal: Positive for arthralgias (knees, shoulders)  Negative for joint swelling and myalgias  Skin: Negative  Neurological: Negative for dizziness, weakness, light-headedness, numbness and headaches  Hematological: Negative  Psychiatric/Behavioral: Negative          Past Medical History:   Diagnosis Date    Abdominal hernia     Anxiety     Arthritis     o a  left knee    Asthma     Brain injury ([facility] ) 2014    Short term memory problems; confusion; has affected depth perception    CPAP (continuous positive airway pressure) dependence     Depression     GERD (gastroesophageal reflux disease)     History of gestational diabetes     Was on insulin    Hypertension     Knee pain, right     Obese     Pneumonia     x1    [facility]     Sleep apnea     Vomiting        Past Surgical History:   Procedure Laterality Date    ANAL FISSURECTOMY       SECTION      x1    ELBOW SURGERY Left     FOOT SURGERY Bilateral     5th toes    LAPAROSCOPIC GASTRIC BANDING  2010    Amanda Chandler EGD TRANSORAL BIOPSY SINGLE/MULTIPLE N/A 1/3/2018    Procedure: ESOPHAGOGASTRODUODENOSCOPY (EGD) with biopsy;  Surgeon: COCO CARBAJAL MD;  Location: [facility];   Service: Bariatrics       Social History     Social History    Marital status: Single     Spouse name: N/A    Number of children: 1    Years of education: N/A     Occupational History    Unemployed "Looking for work"     Disabled      Social History Main Topics    Smoking status: Current Every Day Smoker     Packs/day: 0 15     Years: 3 00     Types: Cigarettes    Smokeless tobacco: Never Used      Comment: 1 ppd as per allscripts    Alcohol use Yes      Comment: Holidays  // stopped drinking alcohol as per allscripts    Drug use: No    Sexual activity: Yes     Partners: Female     Other Topics Concern    Not on file     Social History Narrative    One child    Lives in house with partner    Disabled    Advanced directive information unavailable    Domestic partner    House           Family History   Problem Relation Age of Onset    Diabetes type II Mother     Hypertension Mother     Arthritis Mother     Congenital heart disease Mother     Stroke Mother     Diabetes type II Father     Hypertension Father     Asthma Father     Sudden death Other 28     niece    Asthma Sister     Asthma Brother     Seizures Son     Asthma Maternal Aunt        Allergies   Allergen Reactions    Sulfasalazine Rash    Other Rash     Adhesive tape         Current Outpatient Prescriptions:     albuterol (2 5 mg/3 mL) 0 083 % nebulizer solution, Take 2 5 mg by nebulization every 6 (six) hours as needed for wheezing, Disp: , Rfl:     albuterol (PROVENTIL HFA,VENTOLIN HFA) 90 mcg/act inhaler, Inhale 2 puffs every 6 (six) hours as needed for wheezing, Disp: , Rfl:     budesonide-formoterol (SYMBICORT) 160-4 5 mcg/act inhaler, Inhale 2 puffs 2 (two) times a day, Disp: , Rfl:     buPROPion (WELLBUTRIN SR) 150 mg 12 hr tablet, Take 150 mg by mouth 2 (two) times a day, Disp: , Rfl:     fluticasone (FLONASE) 50 mcg/act nasal spray, 1 spray into each nostril as needed  , Disp: , Rfl:     losartan 50 mg TABS 100 mg, hydrochlorothiazide 25 mg TABS 25 mg, Take 1 tablet by mouth every morning  , Disp: , Rfl:     montelukast (SINGULAIR) 10 mg tablet, Take 10 mg by mouth daily at bedtime, Disp: , Rfl:     oxaprozin (DAYPRO) 600 MG tablet, Take 600 mg by mouth 2 (two) times a day, Disp: , Rfl:     predniSONE 10 mg tablet, Take 10 mg by mouth 2 (two) times a day, Disp: , Rfl:     spironolactone (ALDACTONE) 25 mg tablet, Take 25 mg by mouth as needed, Disp: , Rfl:     Tiotropium Bromide Monohydrate (SPIRIVA RESPIMAT) 1 25 MCG/ACT AERS, Inhale every morning  , Disp: , Rfl:       Physical Exam:  There were no vitals taken for this visit  Physical Exam   Constitutional: She is oriented to person, place, and time  She appears well-developed and well-nourished  No distress  Overweight      HENT:   Head: Normocephalic and atraumatic  Eyes: Conjunctivae are normal  No scleral icterus  Neck: Normal range of motion  Neck supple  Cardiovascular: Normal rate, regular rhythm and normal heart sounds  No murmur heard  Pulmonary/Chest: Effort normal and breath sounds normal  No respiratory distress  Abdominal: Soft  There is no tenderness  Palpation of port for lap band in left upper quadrant    Musculoskeletal: Normal range of motion  She exhibits no edema or tenderness  Lymphadenopathy:     She has no cervical adenopathy  Neurological: She is alert and oriented to person, place, and time  No cranial nerve deficit  Skin: Skin is warm and dry  Psychiatric: She has a normal mood and affect  Vitals reviewed          Labs:  Component      Latest Ref Rng & Units 2/19/2018 2/28/2018 3/2/2018 3/5/2018   WBC      4 31 - 10 16 Thousand/uL 16 89 (H) 15 13 (H) 11 54 (H) 13 19 (H)   RBC      3 81 - 5 12 Million/uL 4 57 4 92 4 74 4 53   Hemoglobin      11 5 - 15 4 g/dL 12 2 13 6 13 1 12 3   Hematocrit      34 8 - 46 1 % 38 6 41 2 39 3 38 2   MCV      82 - 98 fL 85 84 83 84   MCH      26 8 - 34 3 pg 26 7 (L) 27 6 27 6 27 2   MCHC      31 4 - 37 4 g/dL 31 6 33 0 33 3 32 2   RDW      11 6 - 15 1 % 15 6 (H) 15 3 (H) 15 1 15 6 (H)   MPV      8 9 - 12 7 fL 9 1 9 9 9 6 9 8   Platelets      194 - 390 Thousands/uL 578 (H) 591 (H) 453 (H) 437 (H)   nRBC      /100 WBCs 0 0 0 0   Neutrophils Relative      43 - 75 % 86 (H) 85 (H) 87 (H) 86 (H)   Lymphocytes Relative      14 - 44 % 8 (L) 11 (L) 8 (L) 11 (L)   Monocytes Relative      4 - 12 % 5 3 (L) 5 3 (L)   Eosinophils Relative      0 - 6 % 1 0 0 0   Basophils Relative      0 - 1 % 0 1 0 0   Neutrophils Absolute      1 85 - 7 62 Thousands/µL 14 63 (H) 12 85 (H) 10 01 (H) 11 32 (H)   Lymphocytes Absolute      0 60 - 4 47 Thousands/µL 1 27 1 68 0 87 1 43   Monocytes Absolute      0 17 - 1 22 Thousand/µL 0 82 0 51 0 62 0 40   Eosinophils Absolute      0 00 - 0 61 Thousand/µL 0 08 0 02 0 01 0 01   Basophils Absolute      0 00 - 0 10 Thousands/µL 0 04 0 07 0 03 0 03     Component      Latest Ref Rng & Units 3/12/2018   WBC      4 31 - 10 16 Thousand/uL 15 58 (H)   RBC      3 81 - 5 12 Million/uL 4 68   Hemoglobin      11 5 - 15 4 g/dL 12 9   Hematocrit      34 8 - 46 1 % 39 6   MCV      82 - 98 fL 85   MCH      26 8 - 34 3 pg 27 6   MCHC      31 4 - 37 4 g/dL 32 6   RDW      11 6 - 15 1 % 16 0 (H)   MPV      8 9 - 12 7 fL 9 2   Platelets      455 - 390 Thousands/uL 448 (H)   nRBC      /100 WBCs 0   Neutrophils Relative      43 - 75 % 89 (H)   Lymphocytes Relative      14 - 44 % 7 (L)   Monocytes Relative      4 - 12 % 4   Eosinophils Relative      0 - 6 % 0   Basophils Relative      0 - 1 % 0   Neutrophils Absolute      1 85 - 7 62 Thousands/µL 13 84 (H)   Lymphocytes Absolute      0 60 - 4 47 Thousands/µL 1 03   Monocytes Absolute      0 17 - 1 22 Thousand/µL 0 60   Eosinophils Absolute      0 00 - 0 61 Thousand/µL 0 07   Basophils Absolute      0 00 - 0 10 Thousands/µL 0 04     2/19/18  CRP high sensitivity, 39 9 (elevated)  CMP normal     12/21/17  WBC 8 9,   Hemoglobin 12 6,  Platelets 648    2/60/79  WBC 8 0,   Hemoglobin 12 9,    Platelets 676    3/88/62  WBC 11 7, ANC 9 3,  Hemoglobin 13 0 ,   Platelets 201    5/56/20  WBC 8 8,   Hemoglobin 13 1,   Platelets 804       3/95/05  WBC 9 3,  Hemoglobin  12 6,  Platelets 724      47/65/97   WBC 9 6,  Hemoglobin 12 7,   Platelets 490      Patient voiced understanding and agreement in the above discussion  Aware to contact our office with questions/symptoms in the interim

## 2018-03-23 ENCOUNTER — APPOINTMENT (OUTPATIENT)
Dept: LAB | Facility: HOSPITAL | Age: 53
End: 2018-03-23
Payer: COMMERCIAL

## 2018-03-23 DIAGNOSIS — D72.829 LEUKOCYTOSIS, UNSPECIFIED TYPE: ICD-10-CM

## 2018-03-23 DIAGNOSIS — D47.1 MYELOPROLIFERATIVE DISEASE (HCC): ICD-10-CM

## 2018-03-23 LAB
ALBUMIN SERPL BCP-MCNC: 3.3 G/DL (ref 3.5–5)
ALP SERPL-CCNC: 62 U/L (ref 46–116)
ALT SERPL W P-5'-P-CCNC: 17 U/L (ref 12–78)
ANION GAP SERPL CALCULATED.3IONS-SCNC: 12 MMOL/L (ref 4–13)
AST SERPL W P-5'-P-CCNC: 15 U/L (ref 5–45)
BASOPHILS # BLD AUTO: 0.03 THOUSANDS/ΜL (ref 0–0.1)
BASOPHILS NFR BLD AUTO: 0 % (ref 0–1)
BILIRUB SERPL-MCNC: 0.26 MG/DL (ref 0.2–1)
BUN SERPL-MCNC: 16 MG/DL (ref 5–25)
CALCIUM SERPL-MCNC: 9.2 MG/DL (ref 8.3–10.1)
CHLORIDE SERPL-SCNC: 104 MMOL/L (ref 100–108)
CO2 SERPL-SCNC: 27 MMOL/L (ref 21–32)
CREAT SERPL-MCNC: 0.8 MG/DL (ref 0.6–1.3)
EOSINOPHIL # BLD AUTO: 0.08 THOUSAND/ΜL (ref 0–0.61)
EOSINOPHIL NFR BLD AUTO: 1 % (ref 0–6)
ERYTHROCYTE [DISTWIDTH] IN BLOOD BY AUTOMATED COUNT: 16.1 % (ref 11.6–15.1)
GFR SERPL CREATININE-BSD FRML MDRD: 84 ML/MIN/1.73SQ M
GLUCOSE P FAST SERPL-MCNC: 76 MG/DL (ref 65–99)
HCT VFR BLD AUTO: 38.6 % (ref 34.8–46.1)
HGB BLD-MCNC: 12.3 G/DL (ref 11.5–15.4)
LDH SERPL-CCNC: 215 U/L (ref 81–234)
LYMPHOCYTES # BLD AUTO: 2.27 THOUSANDS/ΜL (ref 0.6–4.47)
LYMPHOCYTES NFR BLD AUTO: 22 % (ref 14–44)
MCH RBC QN AUTO: 27.1 PG (ref 26.8–34.3)
MCHC RBC AUTO-ENTMCNC: 31.9 G/DL (ref 31.4–37.4)
MCV RBC AUTO: 85 FL (ref 82–98)
MONOCYTES # BLD AUTO: 0.95 THOUSAND/ΜL (ref 0.17–1.22)
MONOCYTES NFR BLD AUTO: 9 % (ref 4–12)
NEUTROPHILS # BLD AUTO: 6.8 THOUSANDS/ΜL (ref 1.85–7.62)
NEUTS SEG NFR BLD AUTO: 68 % (ref 43–75)
NRBC BLD AUTO-RTO: 0 /100 WBCS
PLATELET # BLD AUTO: 462 THOUSANDS/UL (ref 149–390)
PMV BLD AUTO: 9.5 FL (ref 8.9–12.7)
POTASSIUM SERPL-SCNC: 3.5 MMOL/L (ref 3.5–5.3)
PROT SERPL-MCNC: 7.1 G/DL (ref 6.4–8.2)
RBC # BLD AUTO: 4.54 MILLION/UL (ref 3.81–5.12)
SODIUM SERPL-SCNC: 143 MMOL/L (ref 136–145)
URATE SERPL-MCNC: 3.2 MG/DL (ref 2–6.8)
WBC # BLD AUTO: 10.13 THOUSAND/UL (ref 4.31–10.16)

## 2018-03-23 PROCEDURE — 36415 COLL VENOUS BLD VENIPUNCTURE: CPT

## 2018-03-23 PROCEDURE — 88185 FLOWCYTOMETRY/TC ADD-ON: CPT

## 2018-03-23 PROCEDURE — 80053 COMPREHEN METABOLIC PANEL: CPT

## 2018-03-23 PROCEDURE — 84550 ASSAY OF BLOOD/URIC ACID: CPT

## 2018-03-23 PROCEDURE — 88374 M/PHMTRC ALYS ISHQUANT/SEMIQ: CPT

## 2018-03-23 PROCEDURE — 83615 LACTATE (LD) (LDH) ENZYME: CPT

## 2018-03-23 PROCEDURE — 81270 JAK2 GENE: CPT

## 2018-03-23 PROCEDURE — 88184 FLOWCYTOMETRY/ TC 1 MARKER: CPT

## 2018-03-23 PROCEDURE — 85025 COMPLETE CBC W/AUTO DIFF WBC: CPT

## 2018-03-29 ENCOUNTER — HOSPITAL ENCOUNTER (OUTPATIENT)
Dept: ULTRASOUND IMAGING | Facility: HOSPITAL | Age: 53
Discharge: HOME | End: 2018-03-29
Payer: COMMERCIAL

## 2018-03-29 DIAGNOSIS — D72.829 LEUKOCYTOSIS, UNSPECIFIED TYPE: ICD-10-CM

## 2018-03-29 LAB — SCAN RESULT: NORMAL

## 2018-03-29 PROCEDURE — 76700 US EXAM ABDOM COMPLETE: CPT

## 2018-04-03 LAB
SCAN RESULT: NORMAL
SCAN RESULT: NORMAL

## 2018-04-05 ENCOUNTER — OFFICE VISIT (OUTPATIENT)
Dept: HEMATOLOGY ONCOLOGY | Facility: CLINIC | Age: 53
End: 2018-04-05
Payer: COMMERCIAL

## 2018-04-05 VITALS
DIASTOLIC BLOOD PRESSURE: 72 MMHG | WEIGHT: 206.6 LBS | HEIGHT: 62 IN | BODY MASS INDEX: 38.02 KG/M2 | SYSTOLIC BLOOD PRESSURE: 124 MMHG | RESPIRATION RATE: 18 BRPM | TEMPERATURE: 97.9 F | HEART RATE: 98 BPM

## 2018-04-05 DIAGNOSIS — D72.829 LEUKOCYTOSIS, UNSPECIFIED TYPE: Primary | ICD-10-CM

## 2018-04-05 DIAGNOSIS — J45.30 MILD PERSISTENT ASTHMA WITHOUT COMPLICATION: Primary | ICD-10-CM

## 2018-04-05 DIAGNOSIS — D75.839 THROMBOCYTOSIS: ICD-10-CM

## 2018-04-05 PROCEDURE — 99214 OFFICE O/P EST MOD 30 MIN: CPT | Performed by: PHYSICIAN ASSISTANT

## 2018-04-05 RX ORDER — SENNOSIDES 8.6 MG
650 CAPSULE ORAL EVERY 8 HOURS PRN
COMMUNITY

## 2018-04-05 NOTE — LETTER
April 5, 2018     Gerri Bras, [address]  [address]    Patient: Naomi Chino   YOB: 1965   Date of Visit: 4/5/2018       Dear Dr Mitchell Aguayo:    Thank you for referring Javier Guajardo to me for evaluation  Below are my notes for this consultation  If you have questions, please do not hesitate to call me  I look forward to following your patient along with you  Sincerely,        Clayton Salinas PA-C        CC: DO Nahum Wilder MD [place], [place]  4/5/2018  2:43 PM  Sign at close encounter  Hematology/Oncology Outpatient Follow-up  Naomi Chino 47 y o  female 1965 [id number]    Date:  4/5/2018      Assessment and Plan:  1  Leukocytosis, unspecified type  - Patient was seen in consultation regarding leukocytosis and thrombocytosis  She has RA and follows with Rheumatology at [facility]  She was on 10 mg BID  Since 3/22/18 visit with Rhuematology, she decreased prednisone to 5 mg BID  - Work up from our service    CHARITY -2 negative   BCR/ABL negative    Flow cytometry for lymphoproliferative disorder is negative   LDH   215    Uric acid   3 2    CMP normal except for albumin 3 3    Repeat CBC on 3/23/18     WBC 10 13, hemoglobin 12 3, platelets 770, ANC 6 8, remainder of differential also normal   US of the abdomen shows fatty liver, spleen is normal size 8 0 x 3 0 x 10 cm   - Work up was negative  WBC returned to normal range  Discussed that previously elevated WBC was likely related to RA and chronic prednisone use  -With pre-op testing, please obtain PT, INR, PTT  If this is normal, patient is hematologically clear for surgery  If not normal, please contact us for recommendations  2  Thrombocytosis ([address] )  -CHARITY-2 negative  -Likely secondary to underlying inflammatory state with RA  - Recommend DVT prophylaxis post-op, per surgical protocol for the specific procedure       - CBC and differential       HPI:  47year old female presents for consultation for leukocytosis  This has been present since Feb 2018  Please refer to labs at end of the note       She has rheumatoid arthritis  She follows with Dr Jose Gracia at [facility]  Mostly affects the shoulders and knees  She is on prednisone 10 mg BID for the past 2 weeks       She has been on prednisone for about a year  She was diagnosed with RA about a year ago  She saw Dr Jose Gracia today  He plans to decrease to 5 mg PO BID, starting tomorrow  After on lower dose of prednisone, for 4 weeks he plans to recheck sed rate and CRP       He is also on Daypro for right knee pain  She has 2 meniscus tears  She was scheduled for arthroscopy in March 2018 with Dr Huang Alonso was but cancelled due to leukocytosis       She has lap band that was placed in 2010/2011  She is planning to have gastric bypass, removal of lap band, and correction of hernia with Dr Crista Jarrell       Smoking history- smokes on average 2 cigarettes per day for the past 2 years  Social alcohol use      3/8/18 at PCP weight was 213, today 207  She has not been eating as much  Interval history:    ROS: Review of Systems   Constitutional: Positive for fatigue  Negative for appetite change, chills, fever and unexpected weight change  HENT: Negative for mouth sores and nosebleeds  Respiratory: Negative for cough, chest tightness, shortness of breath and wheezing  Cardiovascular: Negative for chest pain, palpitations and leg swelling  Gastrointestinal: Negative for abdominal pain, blood in stool, constipation, diarrhea, nausea and vomiting  Genitourinary: Negative for difficulty urinating, dysuria and hematuria  Musculoskeletal: Positive for arthralgias  Negative for joint swelling and myalgias  Skin: Negative  Neurological: Negative for dizziness, weakness, light-headedness, numbness and headaches  Hematological: Negative  Psychiatric/Behavioral: Negative          Past Medical History:   Diagnosis Date    Abdominal hernia     Anxiety     Arthritis     o a  left knee    Asthma     [facility]     Short term memory problems; confusion; has affected depth perception    CPAP (continuous positive airway pressure) dependence     Depression     GERD (gastroesophageal reflux disease)     History of gestational diabetes     Was on insulin    Hypertension     Knee pain, right     Obese     Pneumonia     x1    RA [facility]     Sleep apnea     Vomiting        Past Surgical History:   Procedure Laterality Date    ANAL FISSURECTOMY       SECTION      x1    ELBOW SURGERY Left     FOOT SURGERY Bilateral     5th toes    LAPAROSCOPIC GASTRIC BANDING  2010    Dr Lawanda Chance, [place]    [facility] TRANSOR[facility] BIOPSY SINGLE/MULTIPLE N/A 1/3/2018    Procedure: ESOPHAGOGASTRODUODENOSCOPY (EGD) with biopsy;  Surgeon: Krunal Phelps MD;  Location: [facility];   Service: Bariatrics       Social History     Social History    Marital status: Single     Spouse name: N/A    Number of children: 1    Years of education: N/A     Occupational History    Unemployed "Looking for work"     Disabled      Social History Main Topics    Smoking status: Current Every Day Smoker     Packs/day: 0 15     Years: 3 00     Types: Cigarettes    Smokeless tobacco: Never Used      Comment: 1 ppd as per allscripts    Alcohol use Yes      Comment: Holidays  // stopped drinking alcohol as per allscripts    Drug use: No    Sexual activity: Yes     Partners: Female     Other Topics Concern    Not on file     Social History Narrative    One child    Lives in house with partner    Disabled    Advanced directive information unavailable    Domestic partner    House           Family History   Problem Relation Age of Onset    Diabetes type II Mother     Hypertension Mother     Arthritis Mother     Congenital heart disease Mother     Stroke Mother     Diabetes type II Father     Hypertension Father     Asthma Father  Sudden death Other 28     niece    Asthma Sister     Asthma Brother     Seizures Son     Asthma Maternal Aunt        Allergies   Allergen Reactions    Sulfasalazine Rash    Other Rash     Adhesive tape         Current Outpatient Prescriptions:     albuterol (2 5 mg/3 mL) 0 083 % nebulizer solution, Take 2 5 mg by nebulization every 6 (six) hours as needed for wheezing, Disp: , Rfl:     budesonide-formoterol (SYMBICORT) 160-4 5 mcg/act inhaler, Inhale 2 puffs 2 (two) times a day, Disp: , Rfl:     buPROPion (WELLBUTRIN SR) 150 mg 12 hr tablet, Take 150 mg by mouth 2 (two) times a day, Disp: , Rfl:     fluticasone (FLONASE) 50 mcg/act nasal spray, 1 spray into each nostril as needed  , Disp: , Rfl:     losartan 50 mg TABS 100 mg, hydrochlorothiazide 25 mg TABS 25 mg, Take 1 tablet by mouth every morning  , Disp: , Rfl:     montelukast (SINGULAIR) 10 mg tablet, Take 10 mg by mouth daily at bedtime, Disp: , Rfl:     oxaprozin (DAYPRO) 600 MG tablet, Take 600 mg by mouth 2 (two) times a day, Disp: , Rfl:     predniSONE 10 mg tablet, Take 10 mg by mouth 2 (two) times a day, Disp: , Rfl:     spironolactone (ALDACTONE) 25 mg tablet, Take 25 mg by mouth as needed, Disp: , Rfl:     Tiotropium Bromide Monohydrate (SPIRIVA RESPIMAT) 1 25 MCG/ACT AERS, Inhale every morning  , Disp: , Rfl:     VENTOLIN  (90 Base) MCG/ACT inhaler, INHALE 2 PUFFS FOUR TIMES DAILY AS DIRECTED , Disp: 18 Inhaler, Rfl: 3      Physical Exam:  There were no vitals taken for this visit  Physical Exam   Constitutional: She is oriented to person, place, and time  She appears well-developed and well-nourished  No distress  Overweight      HENT:   Head: Normocephalic and atraumatic  Eyes: Conjunctivae are normal  No scleral icterus  Neck: Normal range of motion  Neck supple  Cardiovascular: Normal rate, regular rhythm and normal heart sounds  No murmur heard    Pulmonary/Chest: Effort normal and breath sounds normal  No respiratory distress  Abdominal: Soft  There is no tenderness  Musculoskeletal: Normal range of motion  She exhibits no edema or tenderness  Lymphadenopathy:     She has no cervical adenopathy  Neurological: She is alert and oriented to person, place, and time  No cranial nerve deficit  Skin: Skin is warm and dry  Psychiatric: She has a normal mood and affect  Vitals reviewed        Labs:  Lab Results   Component Value Date    WBC 10 13 03/23/2018    HGB 12 3 03/23/2018    HCT 38 6 03/23/2018    MCV 85 03/23/2018     (H) 03/23/2018     Lab Results   Component Value Date     03/23/2018    K 3 5 03/23/2018     03/23/2018    CO2 27 03/23/2018    ANIONGAP 12 03/23/2018    BUN 16 03/23/2018    CREATININE 0 80 03/23/2018    GLUCOSE 85 08/14/2015    GLUF 76 03/23/2018    CALCIUM 9 2 03/23/2018    AST 15 03/23/2018    ALT 17 03/23/2018    ALKPHOS 62 03/23/2018    PROT 7 1 03/23/2018    BILITOT 0 26 03/23/2018    EGFR 84 03/23/2018       Component      Latest Ref Rng & Units 10/19/2016 2/19/2018 2/28/2018 3/2/2018   WBC      4 31 - 10 16 Thousand/uL 8 67 16 89 (H) 15 13 (H) 11 54 (H)   RBC      3 81 - 5 12 Million/uL 4 34 4 57 4 92 4 74   Hemoglobin      11 5 - 15 4 g/dL 12 6 12 2 13 6 13 1   Hematocrit      34 8 - 46 1 % 38 8 38 6 41 2 39 3   MCV      82 - 98 fL 89 85 84 83   MCH      26 8 - 34 3 pg 29 0 26 7 (L) 27 6 27 6   MCHC      31 4 - 37 4 g/dL 32 5 31 6 33 0 33 3   RDW      11 6 - 15 1 % 14 6 15 6 (H) 15 3 (H) 15 1   MPV      8 9 - 12 7 fL 10 1 9 1 9 9 9 6   Platelets      289 - 390 Thousands/uL 373 578 (H) 591 (H) 453 (H)   nRBC      /100 WBCs  0 0 0   Neutrophils Relative      43 - 75 %  86 (H) 85 (H) 87 (H)   Lymphocytes Relative      14 - 44 %  8 (L) 11 (L) 8 (L)   Monocytes Relative      4 - 12 %  5 3 (L) 5   Eosinophils Relative      0 - 6 %  1 0 0   Basophils Relative      0 - 1 %  0 1 0   Neutrophils Absolute      1 85 - 7 62 Thousands/µL  14 63 (H) 12 85 (H) 10 01 (H) Lymphocytes Absolute      0 60 - 4 47 Thousands/µL  1 27 1 68 0 87   Monocytes Absolute      0 17 - 1 22 Thousand/µL  0 82 0 51 0 62   Eosinophils Absolute      0 00 - 0 61 Thousand/µL  0 08 0 02 0 01   Basophils Absolute      0 00 - 0 10 Thousands/µL  0 04 0 07 0 03     Component      Latest Ref Rng & Units 3/5/2018 3/12/2018 3/23/2018   WBC      4 31 - 10 16 Thousand/uL 13 19 (H) 15 58 (H) 10 13   RBC      3 81 - 5 12 Million/uL 4 53 4 68 4 54   Hemoglobin      11 5 - 15 4 g/dL 12 3 12 9 12 3   Hematocrit      34 8 - 46 1 % 38 2 39 6 38 6   MCV      82 - 98 fL 84 85 85   MCH      26 8 - 34 3 pg 27 2 27 6 27 1   MCHC      31 4 - 37 4 g/dL 32 2 32 6 31 9   RDW      11 6 - 15 1 % 15 6 (H) 16 0 (H) 16 1 (H)   MPV      8 9 - 12 7 fL 9 8 9 2 9 5   Platelets      002 - 390 Thousands/uL 437 (H) 448 (H) 462 (H)   nRBC      /100 WBCs 0 0 0   Neutrophils Relative      43 - 75 % 86 (H) 89 (H) 68   Lymphocytes Relative      14 - 44 % 11 (L) 7 (L) 22   Monocytes Relative      4 - 12 % 3 (L) 4 9   Eosinophils Relative      0 - 6 % 0 0 1   Basophils Relative      0 - 1 % 0 0 0   Neutrophils Absolute      1 85 - 7 62 Thousands/µL 11 32 (H) 13 84 (H) 6 80   Lymphocytes Absolute      0 60 - 4 47 Thousands/µL 1 43 1 03 2 27   Monocytes Absolute      0 17 - 1 22 Thousand/µL 0 40 0 60 0 95   Eosinophils Absolute      0 00 - 0 61 Thousand/µL 0 01 0 07 0 08   Basophils Absolute      0 00 - 0 10 Thousands/µL 0 03 0 04 0 03     2/19/18  CRP high sensitivity, 39 9 (elevated)  CMP normal      12/21/17  WBC 8 9,   Hemoglobin 12 6,  Platelets 365     7/09/16  WBC 8 0,   Hemoglobin 12 9,    Platelets 319     5/53/56  WBC 11 7, ANC 9 3,  Hemoglobin 13 0 ,   Platelets 823     7/98/73  WBC 8 8,   Hemoglobin 13 1,   Platelets 649      4/99/09  WBC 9 3,  Hemoglobin  12 6,  Platelets 970     88/69/93   WBC 9 6,  Hemoglobin 12 7,   Platelets 974     Patient voiced understanding and agreement in the above discussion   Aware to contact our office with questions/symptoms in the interim

## 2018-04-05 NOTE — PROGRESS NOTES
Hematology/Oncology Outpatient Follow-up  She Hardy 47 y o  female 1965 [id number]    Date:  4/5/2018      Assessment and Plan:  1  Leukocytosis, unspecified type  - Patient was seen in consultation regarding leukocytosis and thrombocytosis  She has RA and follows with Rheumatology at [facility]  She was on 10 mg BID  Since 3/22/18 visit with Rhuematology, she decreased prednisone to 5 mg BID  - Work up from our service    CHARITY -2 negative   BCR/ABL negative    Flow cytometry for lymphoproliferative disorder is negative   LDH   215    Uric acid   3 2    CMP normal except for albumin 3 3    Repeat CBC on 3/23/18     WBC 10 13, hemoglobin 12 3, platelets 962, ANC 6 8, remainder of differential also normal   US of the abdomen shows fatty liver, spleen is normal size 8 0 x 3 0 x 10 cm   - Work up was negative  WBC returned to normal range  Discussed that previously elevated WBC was likely related to RA and chronic prednisone use  -With pre-op testing, please obtain PT, INR, PTT  If this is normal, patient is hematologically clear for surgery  If not normal, please contact us for recommendations  2  Thrombocytosis ([address] )  -CHARITY-2 negative  -Likely secondary to underlying inflammatory state with RA  - Recommend DVT prophylaxis post-op, per surgical protocol for the specific procedure  - CBC and differential       HPI:  47year old female presents for consultation for leukocytosis  This has been present since Feb 2018  Please refer to labs at end of the note       She has rheumatoid arthritis  She follows with Dr Sana Gonzalez at [facility]  Mostly affects the shoulders and knees  She is on prednisone 10 mg BID for the past 2 weeks       She has been on prednisone for about a year  She was diagnosed with RA about a year ago  She saw Dr Sana Gonzalez today  He plans to decrease to 5 mg PO BID, starting tomorrow   After on lower dose of prednisone, for 4 weeks he plans to recheck sed rate and CRP       He is also on Daypro for right knee pain  She has 2 meniscus tears  She was scheduled for arthroscopy in 2018 with Dr John Vergara was but cancelled due to leukocytosis       She has lap band that was placed in   She is planning to have gastric bypass, removal of lap band, and correction of hernia with Dr Ventura Hardin       Smoking history- smokes on average 2 cigarettes per day for the past 2 years  Social alcohol use      3/8/18 at PCP weight was 213, today 207  She has not been eating as much  Interval history:    ROS: Review of Systems   Constitutional: Positive for fatigue  Negative for appetite change, chills, fever and unexpected weight change  HENT: Negative for mouth sores and nosebleeds  Respiratory: Negative for cough, chest tightness, shortness of breath and wheezing  Cardiovascular: Negative for chest pain, palpitations and leg swelling  Gastrointestinal: Negative for abdominal pain, blood in stool, constipation, diarrhea, nausea and vomiting  Genitourinary: Negative for difficulty urinating, dysuria and hematuria  Musculoskeletal: Positive for arthralgias  Negative for joint swelling and myalgias  Skin: Negative  Neurological: Negative for dizziness, weakness, light-headedness, numbness and headaches  Hematological: Negative  Psychiatric/Behavioral: Negative          Past Medical History:   Diagnosis Date    Abdominal hernia     Anxiety     Arthritis     o a  left knee    Asthma     [facility]     Short term memory problems; confusion; has affected depth perception    CPAP (continuous positive airway pressure) dependence     Depression     GERD (gastroesophageal reflux disease)     History of gestational diabetes     Was on insulin    Hypertension     Knee pain, right     Obese     Pneumonia     x1    RA (rheumatoid [facility]) [facility] )     Sleep apnea     Vomiting        Past Surgical History:   Procedure Laterality Date    ANAL FISSURECTOMY       SECTION x1    ELBOW SURGERY Left     FOOT SURGERY Bilateral     5th toes    LAPAROSCOPIC GASTRIC BANDING  11/23/2010    Dr Ijeoma Trujillo, [place]    [facility] E[facility] TRANSORAL BIOPSY SINGLE/MULTIPLE N/A 1/3/2018    Procedure: ESOPHAGOGASTRODUODENOSCOPY (EGD) with biopsy;  Surgeon: Suma Smith MD;  Location: [facility];   Service: Bariatrics       Social History     Social History    Marital status: Single     Spouse name: N/A    Number of children: 1    Years of education: N/A     Occupational History    Unemployed "Looking for work"     Disabled      Social History Main Topics    Smoking status: Current Every Day Smoker     Packs/day: 0 15     Years: 3 00     Types: Cigarettes    Smokeless tobacco: Never Used      Comment: 1 ppd as per allscripts    Alcohol use Yes      Comment: Holidays  // stopped drinking alcohol as per allscripts    Drug use: No    Sexual activity: Yes     Partners: Female     Other Topics Concern    Not on file     Social History Narrative    One child    Lives in house with partner    Disabled    Advanced directive information unavailable    Domestic partner    House           Family History   Problem Relation Age of Onset    Diabetes type II Mother     Hypertension Mother     Arthritis Mother     Congenital heart disease Mother     Stroke Mother     Diabetes type II Father     Hypertension Father     Asthma Father     Sudden death Other 28     niece    Asthma Sister     Asthma Brother     Seizures Son     Asthma Maternal Aunt        Allergies   Allergen Reactions    Sulfasalazine Rash    Other Rash     Adhesive tape         Current Outpatient Prescriptions:     albuterol (2 5 mg/3 mL) 0 083 % nebulizer solution, Take 2 5 mg by nebulization every 6 (six) hours as needed for wheezing, Disp: , Rfl:     budesonide-formoterol (SYMBICORT) 160-4 5 mcg/act inhaler, Inhale 2 puffs 2 (two) times a day, Disp: , Rfl:     buPROPion (WELLBUTRIN SR) 150 mg 12 hr tablet, Take 150 mg by mouth 2 (two) times a day, Disp: , Rfl:     fluticasone (FLONASE) 50 mcg/act nasal spray, 1 spray into each nostril as needed  , Disp: , Rfl:     losartan 50 mg TABS 100 mg, hydrochlorothiazide 25 mg TABS 25 mg, Take 1 tablet by mouth every morning  , Disp: , Rfl:     montelukast (SINGULAIR) 10 mg tablet, Take 10 mg by mouth daily at bedtime, Disp: , Rfl:     oxaprozin (DAYPRO) 600 MG tablet, Take 600 mg by mouth 2 (two) times a day, Disp: , Rfl:     predniSONE 10 mg tablet, Take 10 mg by mouth 2 (two) times a day, Disp: , Rfl:     spironolactone (ALDACTONE) 25 mg tablet, Take 25 mg by mouth as needed, Disp: , Rfl:     Tiotropium Bromide Monohydrate (SPIRIVA RESPIMAT) 1 25 MCG/ACT AERS, Inhale every morning  , Disp: , Rfl:     VENTOLIN  (90 Base) MCG/ACT inhaler, INHALE 2 PUFFS FOUR TIMES DAILY AS DIRECTED , Disp: 18 Inhaler, Rfl: 3      Physical Exam:  There were no vitals taken for this visit  Physical Exam   Constitutional: She is oriented to person, place, and time  She appears well-developed and well-nourished  No distress  Overweight      HENT:   Head: Normocephalic and atraumatic  Eyes: Conjunctivae are normal  No scleral icterus  Neck: Normal range of motion  Neck supple  Cardiovascular: Normal rate, regular rhythm and normal heart sounds  No murmur heard  Pulmonary/Chest: Effort normal and breath sounds normal  No respiratory distress  Abdominal: Soft  There is no tenderness  Musculoskeletal: Normal range of motion  She exhibits no edema or tenderness  Lymphadenopathy:     She has no cervical adenopathy  Neurological: She is alert and oriented to person, place, and time  No cranial nerve deficit  Skin: Skin is warm and dry  Psychiatric: She has a normal mood and affect  Vitals reviewed        Labs:  Lab Results   Component Value Date    WBC 10 13 03/23/2018    HGB 12 3 03/23/2018    HCT 38 6 03/23/2018    MCV 85 03/23/2018     (H) 03/23/2018     Lab Results Component Value Date     03/23/2018    K 3 5 03/23/2018     03/23/2018    CO2 27 03/23/2018    ANIONGAP 12 03/23/2018    BUN 16 03/23/2018    CREATININE 0 80 03/23/2018    GLUCOSE 85 08/14/2015    GLUF 76 03/23/2018    CALCIUM 9 2 03/23/2018    AST 15 03/23/2018    ALT 17 03/23/2018    ALKPHOS 62 03/23/2018    PROT 7 1 03/23/2018    BILITOT 0 26 03/23/2018    EGFR 84 03/23/2018       Component      Latest Ref Rng & Units 10/19/2016 2/19/2018 2/28/2018 3/2/2018   WBC      4 31 - 10 16 Thousand/uL 8 67 16 89 (H) 15 13 (H) 11 54 (H)   RBC      3 81 - 5 12 Million/uL 4 34 4 57 4 92 4 74   Hemoglobin      11 5 - 15 4 g/dL 12 6 12 2 13 6 13 1   Hematocrit      34 8 - 46 1 % 38 8 38 6 41 2 39 3   MCV      82 - 98 fL 89 85 84 83   MCH      26 8 - 34 3 pg 29 0 26 7 (L) 27 6 27 6   MCHC      31 4 - 37 4 g/dL 32 5 31 6 33 0 33 3   RDW      11 6 - 15 1 % 14 6 15 6 (H) 15 3 (H) 15 1   MPV      8 9 - 12 7 fL 10 1 9 1 9 9 9 6   Platelets      013 - 390 Thousands/uL 373 578 (H) 591 (H) 453 (H)   nRBC      /100 WBCs  0 0 0   Neutrophils Relative      43 - 75 %  86 (H) 85 (H) 87 (H)   Lymphocytes Relative      14 - 44 %  8 (L) 11 (L) 8 (L)   Monocytes Relative      4 - 12 %  5 3 (L) 5   Eosinophils Relative      0 - 6 %  1 0 0   Basophils Relative      0 - 1 %  0 1 0   Neutrophils Absolute      1 85 - 7 62 Thousands/µL  14 63 (H) 12 85 (H) 10 01 (H)   Lymphocytes Absolute      0 60 - 4 47 Thousands/µL  1 27 1 68 0 87   Monocytes Absolute      0 17 - 1 22 Thousand/µL  0 82 0 51 0 62   Eosinophils Absolute      0 00 - 0 61 Thousand/µL  0 08 0 02 0 01   Basophils Absolute      0 00 - 0 10 Thousands/µL  0 04 0 07 0 03     Component      Latest Ref Rng & Units 3/5/2018 3/12/2018 3/23/2018   WBC      4 31 - 10 16 Thousand/uL 13 19 (H) 15 58 (H) 10 13   RBC      3 81 - 5 12 Million/uL 4 53 4 68 4 54   Hemoglobin      11 5 - 15 4 g/dL 12 3 12 9 12 3   Hematocrit      34 8 - 46 1 % 38 2 39 6 38 6   MCV      82 - 98 fL 84 85 85 MCH      26 8 - 34 3 pg 27 2 27 6 27 1   MCHC      31 4 - 37 4 g/dL 32 2 32 6 31 9   RDW      11 6 - 15 1 % 15 6 (H) 16 0 (H) 16 1 (H)   MPV      8 9 - 12 7 fL 9 8 9 2 9 5   Platelets      786 - 390 Thousands/uL 437 (H) 448 (H) 462 (H)   nRBC      /100 WBCs 0 0 0   Neutrophils Relative      43 - 75 % 86 (H) 89 (H) 68   Lymphocytes Relative      14 - 44 % 11 (L) 7 (L) 22   Monocytes Relative      4 - 12 % 3 (L) 4 9   Eosinophils Relative      0 - 6 % 0 0 1   Basophils Relative      0 - 1 % 0 0 0   Neutrophils Absolute      1 85 - 7 62 Thousands/µL 11 32 (H) 13 84 (H) 6 80   Lymphocytes Absolute      0 60 - 4 47 Thousands/µL 1 43 1 03 2 27   Monocytes Absolute      0 17 - 1 22 Thousand/µL 0 40 0 60 0 95   Eosinophils Absolute      0 00 - 0 61 Thousand/µL 0 01 0 07 0 08   Basophils Absolute      0 00 - 0 10 Thousands/µL 0 03 0 04 0 03     2/19/18  CRP high sensitivity, 39 9 (elevated)  CMP normal      12/21/17  WBC 8 9,   Hemoglobin 12 6,  Platelets 737     5/44/60  WBC 8 0,   Hemoglobin 12 9,    Platelets 224     6/40/78  WBC 11 7, ANC 9 3,  Hemoglobin 13 0 ,   Platelets 645     3/80/31  WBC 8 8,   Hemoglobin 13 1,   Platelets 783      2/05/27  WBC 9 3,  Hemoglobin  12 6,  Platelets 565     08/44/91   WBC 9 6,  Hemoglobin 12 7,   Platelets 641     Patient voiced understanding and agreement in the above discussion  Aware to contact our office with questions/symptoms in the interim

## 2018-04-13 ENCOUNTER — TELEPHONE (OUTPATIENT)
Dept: HEMATOLOGY ONCOLOGY | Facility: CLINIC | Age: 53
End: 2018-04-13

## 2018-04-13 ENCOUNTER — APPOINTMENT (OUTPATIENT)
Dept: RADIOLOGY | Facility: CLINIC | Age: 53
End: 2018-04-13
Payer: COMMERCIAL

## 2018-04-13 ENCOUNTER — OFFICE VISIT (OUTPATIENT)
Dept: OBGYN CLINIC | Facility: MEDICAL CENTER | Age: 53
End: 2018-04-13
Payer: COMMERCIAL

## 2018-04-13 VITALS
WEIGHT: 201.8 LBS | HEIGHT: 61 IN | DIASTOLIC BLOOD PRESSURE: 88 MMHG | BODY MASS INDEX: 38.1 KG/M2 | HEART RATE: 108 BPM | SYSTOLIC BLOOD PRESSURE: 125 MMHG

## 2018-04-13 DIAGNOSIS — M25.511 RIGHT SHOULDER PAIN, UNSPECIFIED CHRONICITY: Primary | ICD-10-CM

## 2018-04-13 DIAGNOSIS — M25.512 LEFT SHOULDER PAIN, UNSPECIFIED CHRONICITY: ICD-10-CM

## 2018-04-13 DIAGNOSIS — M25.511 RIGHT SHOULDER PAIN, UNSPECIFIED CHRONICITY: ICD-10-CM

## 2018-04-13 DIAGNOSIS — S83.241A ACUTE MENISCAL TEAR, MEDIAL, RIGHT, INITIAL ENCOUNTER: ICD-10-CM

## 2018-04-13 DIAGNOSIS — M25.512 ACUTE PAIN OF LEFT SHOULDER: ICD-10-CM

## 2018-04-13 DIAGNOSIS — M25.511 ACUTE PAIN OF RIGHT SHOULDER: ICD-10-CM

## 2018-04-13 PROCEDURE — 73030 X-RAY EXAM OF SHOULDER: CPT

## 2018-04-13 PROCEDURE — 99214 OFFICE O/P EST MOD 30 MIN: CPT | Performed by: ORTHOPAEDIC SURGERY

## 2018-04-13 RX ORDER — TRAMADOL HYDROCHLORIDE 50 MG/1
50 TABLET ORAL EVERY 6 HOURS PRN
Qty: 60 TABLET | Refills: 0 | Status: SHIPPED | OUTPATIENT
Start: 2018-04-13 | End: 2018-12-21 | Stop reason: ALTCHOICE

## 2018-04-13 NOTE — TELEPHONE ENCOUNTER
Called and LVM to inform Aleks Rohit that her appt that was scheduled for 07/16/18 at 2:00 pm needed to be R/S  Her appt needed to be R/S due to [address] [facility] at 2:00 pm  Please R/S her if date and/or time does not work for her scheduled

## 2018-04-13 NOTE — PROGRESS NOTES
Assessment/Plan:  1  Right shoulder pain, unspecified chronicity    2  Left shoulder pain, unspecified chronicity    3  Acute pain of right shoulder    4  Acute pain of left shoulder    5  Acute meniscal tear, medial, right, initial encounter      Orders Placed This Encounter   Procedures    XR shoulder 2+ vw left    XR shoulder 2+ vw right    MRI shoulder right wo contrast    MRI shoulder left wo contrast    Ambulatory referral to Physical Therapy     Will schedule [facility] on 5/9  Will obtain preop labs  Will obtain bilateral shoulder MRIs to rule out RTC tears  PT bilateral shoulders  Tylenol, tramadol prn pain  Return for f/u 1 week after surgery, f/u with Madison Drape after bilateral shoulder MRIs  Subjective   Chief Complaint:   Chief Complaint   Patient presents with    Right Knee - Follow-up       Paige Gramajo is a 47 y o  female who presents for follow up to reschedule a surgical arthroscopy of the right knee  She continues to have R knee pain  She is limited in her activities and has tried and failed conservative treatments  She since her last visit she has undergone further workup for her elevated white blood cell count  She would like to schedule surgery for her meniscal tear for some time in the near future  She also has surgery scheduled for gastric bypass at the end of May  She also notes having severe bilateral shoulder pain since a fall about a month ago  She fell onto outstretched hands  Her pain is over her bilateral shoulders and radiates into her upper arms  Denies radiation of pain past her elbows  She admits some intermittent numbness in her thumb, index, and middle fingers on the right but that is intermittent and infrequent  Review of Systems  ROS:    See HPI for musculoskeletal review     All other systems reviewed are negative     History:  Past Medical History:   Diagnosis Date    Abdominal hernia     Anxiety     Arthritis     o a  left knee    Asthma     Brain injury [facility])     Short term memory problems; confusion; has affected depth perception    CPAP (continuous positive airway pressure) dependence     Depression     GERD (gastroesophageal reflux disease)     History of gestational diabetes     Was on insulin    Hypertension     Knee pain, right     Obese     Pneumonia     x1    RA (rheumatoid arthritis) ([address] )     Sleep apnea     Vomiting      Past Surgical History:   Procedure Laterality Date    ANAL FISSURECTOMY       SECTION      x1    ELBOW SURGERY Left     FOOT SURGERY Bilateral     5th toes    LAPAROSCOPIC GASTRIC BANDING  2010    Dr Lawanda Chance, Raza Walters    ND EGD TRANSORAL BIOPSY SINGLE/MULTIPLE N/A 1/3/2018    Procedure: ESOPHAGOGASTRODUODENOSCOPY (EGD) with biopsy;  Surgeon: Krunal Phelps MD;  Location: [facility];   Service: Bariatrics     Social History   History   Alcohol Use    Yes     Comment: Holidays  // stopped drinking alcohol as per allscripts     History   Drug Use No     History   Smoking Status    Current Every Day Smoker    Packs/day: 0 15    Years: 3 00    Types: Cigarettes   Smokeless Tobacco    Never Used     Comment: 1 ppd as per allscripts     Family History:   Family History   Problem Relation Age of Onset    Diabetes type II Mother     Hypertension Mother     Arthritis Mother     Congenital heart disease Mother     Stroke Mother     Diabetes type II Father     Hypertension Father     Asthma Father     Sudden death Other 28     niece    Asthma Sister     Asthma Brother     Seizures Son     Asthma Maternal Aunt        Meds/Allergies     (Not in a hospital admission)  Allergies   Allergen Reactions    Sulfasalazine Rash    Other Rash     Adhesive tape          Objective     /88   Pulse (!) 108   Ht 5' 1" (1 549 m)   Wt 91 5 kg (201 lb 12 8 oz)   BMI 38 13 kg/m²      PE:  AAOx 3  WDWN  Hearing intact, no drainage from eyes  no audible wheezing  no abdominal distension  LE compartments soft, skin intact    Ortho Exam:  right Knee:   No erythema  no swelling  no effusion  no warmth  AROM: full  Stable to varus/valgus stress    Bilateral Shoulder Exam  Alignment / Posture:  n/a  Inspection:  No deformity  Palpation:  + tenderness at superior, lateral, and anterior aspect of shoulder  ROM:  Shoulder FE 0-40  Shoulder ER 0-20  Shoulder IR to iliac crest   Strength:  5/5 wrist flexors and wrist extensors  Stability:  Not tested  Tests: (+) Wheeler  (+) Neer  (+) Drop arm  (+) Painful arc  (+) Cross-body adduction  Neurovascular:  Sensation intact in all digital nerve distributions  Palpable radial pulse  B/l shoulder exams otherwise limited by pain    Imaging Studies: I have personally reviewed pertinent films in PACS  XR R shoulder:  Mild GH arthritis, no acute osseous abnormality  XR L shoulder:  No acute osseous abnormality

## 2018-04-16 ENCOUNTER — TRANSCRIBE ORDERS (OUTPATIENT)
Dept: OBGYN CLINIC | Facility: MEDICAL CENTER | Age: 53
End: 2018-04-16

## 2018-04-18 ENCOUNTER — HOSPITAL ENCOUNTER (OUTPATIENT)
Dept: NON INVASIVE DIAGNOSTICS | Facility: HOSPITAL | Age: 53
Discharge: HOME | End: 2018-04-18
Attending: ORTHOPAEDIC SURGERY
Payer: COMMERCIAL

## 2018-04-18 ENCOUNTER — APPOINTMENT (OUTPATIENT)
Dept: LAB | Facility: HOSPITAL | Age: 53
End: 2018-04-18
Attending: ORTHOPAEDIC SURGERY
Payer: COMMERCIAL

## 2018-04-18 ENCOUNTER — APPOINTMENT (OUTPATIENT)
Dept: PREADMISSION TESTING | Facility: HOSPITAL | Age: 53
End: 2018-04-18
Payer: COMMERCIAL

## 2018-04-18 ENCOUNTER — ANESTHESIA EVENT (OUTPATIENT)
Dept: PERIOP | Facility: HOSPITAL | Age: 53
End: 2018-04-18

## 2018-04-18 DIAGNOSIS — S83.241A ACUTE MENISCAL TEAR, MEDIAL, RIGHT, INITIAL ENCOUNTER: ICD-10-CM

## 2018-04-18 LAB
ANION GAP SERPL CALCULATED.3IONS-SCNC: 9 MMOL/L (ref 4–13)
APTT PPP: 41 SECONDS (ref 23–35)
ATRIAL RATE: 92 BPM
BASOPHILS # BLD AUTO: 0.03 THOUSANDS/ΜL (ref 0–0.1)
BASOPHILS NFR BLD AUTO: 0 % (ref 0–1)
BUN SERPL-MCNC: 10 MG/DL (ref 5–25)
CALCIUM SERPL-MCNC: 9.1 MG/DL (ref 8.3–10.1)
CHLORIDE SERPL-SCNC: 101 MMOL/L (ref 100–108)
CO2 SERPL-SCNC: 29 MMOL/L (ref 21–32)
CREAT SERPL-MCNC: 0.72 MG/DL (ref 0.6–1.3)
EOSINOPHIL # BLD AUTO: 0.08 THOUSAND/ΜL (ref 0–0.61)
EOSINOPHIL NFR BLD AUTO: 1 % (ref 0–6)
ERYTHROCYTE [DISTWIDTH] IN BLOOD BY AUTOMATED COUNT: 15.1 % (ref 11.6–15.1)
GFR SERPL CREATININE-BSD FRML MDRD: 96 ML/MIN/1.73SQ M
GLUCOSE SERPL-MCNC: 93 MG/DL (ref 65–140)
HCT VFR BLD AUTO: 37.2 % (ref 34.8–46.1)
HGB BLD-MCNC: 12.2 G/DL (ref 11.5–15.4)
INR PPP: 0.99 (ref 0.86–1.16)
LYMPHOCYTES # BLD AUTO: 0.9 THOUSANDS/ΜL (ref 0.6–4.47)
LYMPHOCYTES NFR BLD AUTO: 10 % (ref 14–44)
MCH RBC QN AUTO: 27.9 PG (ref 26.8–34.3)
MCHC RBC AUTO-ENTMCNC: 32.8 G/DL (ref 31.4–37.4)
MCV RBC AUTO: 85 FL (ref 82–98)
MONOCYTES # BLD AUTO: 0.74 THOUSAND/ΜL (ref 0.17–1.22)
MONOCYTES NFR BLD AUTO: 8 % (ref 4–12)
NEUTROPHILS # BLD AUTO: 7.28 THOUSANDS/ΜL (ref 1.85–7.62)
NEUTS SEG NFR BLD AUTO: 81 % (ref 43–75)
NRBC BLD AUTO-RTO: 0 /100 WBCS
P AXIS: 49 DEGREES
PLATELET # BLD AUTO: 440 THOUSANDS/UL (ref 149–390)
PMV BLD AUTO: 9.9 FL (ref 8.9–12.7)
POTASSIUM SERPL-SCNC: 4.3 MMOL/L (ref 3.5–5.3)
PR INTERVAL: 138 MS
PROTHROMBIN TIME: 13.1 SECONDS (ref 12.1–14.4)
QRS AXIS: 53 DEGREES
QRSD INTERVAL: 82 MS
QT INTERVAL: 364 MS
QTC INTERVAL: 450 MS
RBC # BLD AUTO: 4.38 MILLION/UL (ref 3.81–5.12)
SODIUM SERPL-SCNC: 139 MMOL/L (ref 136–145)
T WAVE AXIS: 36 DEGREES
VENTRICULAR RATE: 92 BPM
WBC # BLD AUTO: 9.03 THOUSAND/UL (ref 4.31–10.16)

## 2018-04-18 PROCEDURE — 93010 ELECTROCARDIOGRAM REPORT: CPT | Performed by: INTERNAL MEDICINE

## 2018-04-18 PROCEDURE — 80048 BASIC METABOLIC PNL TOTAL CA: CPT

## 2018-04-18 PROCEDURE — 93005 ELECTROCARDIOGRAM TRACING: CPT

## 2018-04-18 PROCEDURE — 36415 COLL VENOUS BLD VENIPUNCTURE: CPT | Performed by: PHYSICIAN ASSISTANT

## 2018-04-18 PROCEDURE — 85025 COMPLETE CBC W/AUTO DIFF WBC: CPT | Performed by: PHYSICIAN ASSISTANT

## 2018-04-18 PROCEDURE — 85610 PROTHROMBIN TIME: CPT

## 2018-04-18 PROCEDURE — 85730 THROMBOPLASTIN TIME PARTIAL: CPT

## 2018-04-18 RX ORDER — SODIUM CHLORIDE 9 MG/ML
125 INJECTION, SOLUTION INTRAVENOUS CONTINUOUS
Status: CANCELLED | OUTPATIENT
Start: 2018-04-18

## 2018-04-18 NOTE — ANESTHESIA PREPROCEDURE EVALUATION
Review of Systems/Medical History  Patient summary reviewed        Cardiovascular  Negative cardio ROS Hypertension controlled,    Pulmonary  Negative pulmonary ROS Smoker cigarette smoker  , Tobacco cessation counseling given Cumulative Pack Years: 3, COPD , Asthma: PRN med  controlled , Sleep apnea ,        GI/Hepatic  Negative GI/hepatic ROS   GERD , Liver disease, ,        Negative  ROS        Endo/Other  Negative endo/other ROS   Obesity    GYN  Negative gynecology ROS          Hematology  Negative hematology ROS      Musculoskeletal  Negative musculoskeletal ROS Rheumatoid arthritis ,   Arthritis     Neurology  Negative neurology ROS      Psychology   Negative psychology ROS Anxiety, Depression ,              Physical Exam    Airway    Mallampati score: II  TM Distance: >3 FB  Neck ROM: full     Dental   No notable dental hx     Cardiovascular  Comment: Negative ROS, Rhythm: regular, Rate: normal, Cardiovascular exam normal    Pulmonary  Pulmonary exam normal Breath sounds clear to auscultation,     Other Findings        Anesthesia Plan  ASA Score- 3     Anesthesia Type- general and regional with ASA Monitors  Additional Monitors:   Airway Plan:     Comment: BLOCK FOR POST OP PAIN MANAGEMENT REQUESTED BY SURGEON   Intra articular   Plan Factors-    Induction- intravenous  Postoperative Plan-     Informed Consent- Anesthetic plan and risks discussed with patient

## 2018-04-19 ENCOUNTER — TELEPHONE (OUTPATIENT)
Dept: HEMATOLOGY ONCOLOGY | Facility: CLINIC | Age: 53
End: 2018-04-19

## 2018-04-20 ENCOUNTER — APPOINTMENT (OUTPATIENT)
Dept: LAB | Facility: HOSPITAL | Age: 53
End: 2018-04-20
Payer: COMMERCIAL

## 2018-04-20 ENCOUNTER — TELEPHONE (OUTPATIENT)
Dept: FAMILY MEDICINE CLINIC | Facility: CLINIC | Age: 53
End: 2018-04-20

## 2018-04-20 DIAGNOSIS — R79.1 ELEVATED PARTIAL THROMBOPLASTIN TIME (PTT): Primary | ICD-10-CM

## 2018-04-20 DIAGNOSIS — F40.240 CLAUSTROPHOBIA: Primary | ICD-10-CM

## 2018-04-20 DIAGNOSIS — R79.1 ELEVATED PARTIAL THROMBOPLASTIN TIME (PTT): ICD-10-CM

## 2018-04-20 PROCEDURE — 85732 THROMBOPLASTIN TIME PARTIAL: CPT

## 2018-04-20 PROCEDURE — 36415 COLL VENOUS BLD VENIPUNCTURE: CPT

## 2018-04-20 RX ORDER — ALPRAZOLAM 0.5 MG/1
0.5 TABLET ORAL
Qty: 2 TABLET | Refills: 0 | Status: SHIPPED | OUTPATIENT
Start: 2018-04-20 | End: 2018-12-21 | Stop reason: ALTCHOICE

## 2018-04-20 NOTE — TELEPHONE ENCOUNTER
Pt called in and stated she is scheduled to get an MRI done on 04/24 she will like to know if you can send in a rx to help her since she is Claustrophobic  Please advise

## 2018-04-21 LAB
APTT 1H NP PPP: 41 SECONDS (ref 24–36)
APTT IMM NP PPP: 32 SECONDS (ref 24–36)
APTT PPP: 39 SECONDS (ref 23–35)

## 2018-04-23 ENCOUNTER — TELEPHONE (OUTPATIENT)
Dept: OBGYN CLINIC | Facility: HOSPITAL | Age: 53
End: 2018-04-23

## 2018-04-23 ENCOUNTER — TELEPHONE (OUTPATIENT)
Dept: HEMATOLOGY ONCOLOGY | Facility: CLINIC | Age: 53
End: 2018-04-23

## 2018-04-23 DIAGNOSIS — R79.1 ELEVATED PARTIAL THROMBOPLASTIN TIME (PTT): Primary | ICD-10-CM

## 2018-04-23 NOTE — TELEPHONE ENCOUNTER
[address] [facility]   [phone]  Patient sees Dr Rico Vaughn was asked to call us to let us know they are still filling out the paperwork, labs will be done this week & they should have an answer soon for the procedure scheduled on 5/9/18

## 2018-04-23 NOTE — TELEPHONE ENCOUNTER
Called patient and let her know information below per PA Michael Gutierrez said that she is going to get her labs done tomorrow

## 2018-04-23 NOTE — TELEPHONE ENCOUNTER
Sammi Robertson called stating that she got her labs completed on Friday and seen on MyChart that some of her results are flagged for High and Low  She wanted to know to know what her next steps are from here and what the lab mean  Please let me know what you would like me to tell her

## 2018-04-23 NOTE — TELEPHONE ENCOUNTER
Please call patient and let her know we now need to do further blood tests  There is an abnormality in the blood clotting pathway and we need to know exactly which part of the pathway is affected  Please have labs done this week at hospital  Labs are not fasting  Also, please call Dr Bill Miller office and let them know we are still completing work up for patient  We should have an answer prior to her procedure scheduled for 5/9/18

## 2018-04-24 ENCOUNTER — HOSPITAL ENCOUNTER (OUTPATIENT)
Dept: RADIOLOGY | Facility: HOSPITAL | Age: 53
Discharge: HOME | End: 2018-04-24
Attending: ORTHOPAEDIC SURGERY
Payer: COMMERCIAL

## 2018-04-24 ENCOUNTER — APPOINTMENT (OUTPATIENT)
Dept: LAB | Facility: HOSPITAL | Age: 53
End: 2018-04-24
Payer: COMMERCIAL

## 2018-04-24 ENCOUNTER — OFFICE VISIT (OUTPATIENT)
Dept: OBGYN CLINIC | Facility: MEDICAL CENTER | Age: 53
End: 2018-04-24
Payer: COMMERCIAL

## 2018-04-24 ENCOUNTER — APPOINTMENT (OUTPATIENT)
Dept: RADIOLOGY | Facility: CLINIC | Age: 53
End: 2018-04-24
Payer: COMMERCIAL

## 2018-04-24 VITALS — HEART RATE: 94 BPM | SYSTOLIC BLOOD PRESSURE: 114 MMHG | DIASTOLIC BLOOD PRESSURE: 79 MMHG

## 2018-04-24 DIAGNOSIS — M25.512 LEFT SHOULDER PAIN, UNSPECIFIED CHRONICITY: Primary | ICD-10-CM

## 2018-04-24 DIAGNOSIS — M25.512 LEFT SHOULDER PAIN, UNSPECIFIED CHRONICITY: ICD-10-CM

## 2018-04-24 DIAGNOSIS — R79.1 ELEVATED PARTIAL THROMBOPLASTIN TIME (PTT): ICD-10-CM

## 2018-04-24 DIAGNOSIS — M25.511 ACUTE PAIN OF RIGHT SHOULDER: ICD-10-CM

## 2018-04-24 DIAGNOSIS — M25.511 RIGHT SHOULDER PAIN, UNSPECIFIED CHRONICITY: ICD-10-CM

## 2018-04-24 DIAGNOSIS — M54.12 RADICULOPATHY, CERVICAL REGION: ICD-10-CM

## 2018-04-24 DIAGNOSIS — M25.512 ACUTE PAIN OF LEFT SHOULDER: ICD-10-CM

## 2018-04-24 PROCEDURE — 99214 OFFICE O/P EST MOD 30 MIN: CPT | Performed by: ORTHOPAEDIC SURGERY

## 2018-04-24 PROCEDURE — 86146 BETA-2 GLYCOPROTEIN ANTIBODY: CPT

## 2018-04-24 PROCEDURE — 86147 CARDIOLIPIN ANTIBODY EA IG: CPT

## 2018-04-24 PROCEDURE — 85705 THROMBOPLASTIN INHIBITION: CPT

## 2018-04-24 PROCEDURE — 36415 COLL VENOUS BLD VENIPUNCTURE: CPT

## 2018-04-24 PROCEDURE — 73221 MRI JOINT UPR EXTREM W/O DYE: CPT

## 2018-04-24 PROCEDURE — 85670 THROMBIN TIME PLASMA: CPT

## 2018-04-24 PROCEDURE — 72040 X-RAY EXAM NECK SPINE 2-3 VW: CPT

## 2018-04-24 PROCEDURE — 85732 THROMBOPLASTIN TIME PARTIAL: CPT

## 2018-04-24 PROCEDURE — 85240 CLOT FACTOR VIII AHG 1 STAGE: CPT

## 2018-04-24 PROCEDURE — 85613 RUSSELL VIPER VENOM DILUTED: CPT

## 2018-04-24 NOTE — PROGRESS NOTES
Orthopaedic Surgery - Office Note  Alissa Casas (58 y o  female)   : 1965   MRN: [id number]  Encounter Date: 2018    Chief Complaint   Patient presents with    Left Shoulder - Pain    Right Shoulder - Pain       Assessment / Plan  Cervical Radiculopathy     · EMG of bilateral upper  · Referral to spine specialist    · Reviewed all imaging with patient, due to B/L pain to shoulders and negative rotator cuff tear noted on MRI, pain most likely due to neck rather than shoulder   Return if symptoms worsen or fail to improve  History of Present Illness  Alissa Casas is a 47 y o  female who presents for bilateral shoulder pain  She states that about a month ago she had a fall because her knee gave out  She is being treated by Dr Vi Joyce for her knees  Bilateral xray and MRIs were ordered for possible rotator cuff tears, which were done this AM  She complains of severe pain with inability to dress/eat/raise hands  She is unable to sleep on her back or sides due to the pain  She was given a physical therapy referral but has not gone yet  She complains of pain radiating without any numbness/tingling  Review of Systems  Pertinent items are noted in HPI  All other systems were reviewed and are negative  Physical Exam  /79   Pulse 94   Cons: Appears well  No apparent distress  Psych: Alert  Oriented x3  Mood and affect normal   Eyes: PERRLA, EOMI  Resp: Normal effort  No audible wheezing or stridor  CV: Palpable pulse  No discernable arrhythmia  No LE edema  Lymph:  No palpable cervical, axillary, or inguinal lymphadenopathy  Skin: Warm  No palpable masses  No visible lesions  Neuro: Normal muscle tone  Normal and symmetric DTR's  Bilateral Shoulder Exam  Alignment / Posture:  Normal cervical alignment  Normal shoulder posture  Inspection:  No swelling  No edema  No erythema  No ecchymosis  No muscle atrophy  No deformity    Palpation:  moderate tenderness at B/L shoulder joints  No effusion  No warmth  ROM:  Shoulder FE 40  Shoulder ER 20  Shoulder IR hip  Strength:  Supraspinatus 4/5  Infraspinatus 4/5  Subscapularis 44/5  Stability:  No objective shoulder instability  Tests: (+) Wheeler  (+) Neer  (+) Drop arm  (+) Painful arc  (+) Belly press  (+) Cross-body adduction  Neurovascular:  Sensation intact in Ax/R/M/U nerve distributions  2+ radial pulse  Studies Reviewed  XR of cervical spine - C5-C6 impingement      XR of bilateral shoulder - No acute osseous abnormality  Right = Mild osteoarthritis as above  MRI of bilateral shoulder - No full-thickness rotator cuff tear  Acute nondisplaced impaction fracture posterior humeral head at the junction with the greater tuberosity  Procedures  No procedures today  Medical, Surgical, Family, and Social History  The patient's medical history, family history, and social history, were reviewed and updated as appropriate      Past Medical History:   Diagnosis Date    Abdominal hernia     Anxiety     Arthritis     o a  left knee    Asthma     [facility] [address] )     Short term memory problems; confusion; has affected depth perception," injury at work"    COPD (chronic obstructive pulmonary disease) ([address] )     CPAP (continuous positive airway pressure) dependence     Depression     Fatty liver     GERD (gastroesophageal reflux disease)     History of gestational diabetes     Was on insulin    History of laparoscopic adjustable gastric banding     History of recent fall     3/2018    Hypertension     Knee pain, right     Obese     Pneumonia     x1    RA (rheumatoid arthritis) ([address] )     Risk for falls     Shortness of breath     "sometimes with asthma"    Shoulder pain, bilateral     pain started after a fall in     Sleep apnea     Vomiting        Past Surgical History:   Procedure Laterality Date    ANAL FISSURECTOMY       SECTION      x1    ELBOW SURGERY Left     FOOT SURGERY Bilateral     5th toes    LAPAROSCOPIC GASTRIC BANDING  11/23/2010    Kiel Travis    CA EGD TRANSORAL BIOPSY SINGLE/MULTIPLE N/A 1/3/2018    Procedure: ESOPHAGOGASTRODUODENOSCOPY (EGD) with biopsy;  Surgeon: Suma Smith MD;  Location: [facility];   Service: Bariatrics       Family History   Problem Relation Age of Onset    Diabetes type II Mother     Hypertension Mother     Arthritis Mother     Congenital heart disease Mother     Stroke Mother     Diabetes type II Father     Hypertension Father     Asthma Father     Sudden death Other 28     niece    Asthma Sister     Asthma Brother     Seizures Son     Asthma Maternal Aunt        Social History     Occupational History    Unemployed "Looking for work"     Disabled      Social History Main Topics    Smoking status: Current Some Day Smoker     Packs/day: 0 15     Years: 3 00     Types: Cigarettes    Smokeless tobacco: Never Used      Comment: pt reports has cut back    Alcohol use Yes      Comment: social    Drug use: No    Sexual activity: Not on file       Allergies   Allergen Reactions    Sulfasalazine Rash    Other Rash     Adhesive tape and adhesive bandaids         Current Outpatient Prescriptions:     acetaminophen (TYLENOL) 650 mg CR tablet, Take 650 mg by mouth every 12 (twelve) hours, Disp: , Rfl:     albuterol (2 5 mg/3 mL) 0 083 % nebulizer solution, Take 2 5 mg by nebulization every 6 (six) hours as needed for wheezing, Disp: , Rfl:     ALPRAZolam (XANAX) 0 5 mg tablet, Take 1 tablet (0 5 mg total) by mouth 60 minutes pre-procedure 1 tab 1 hour before MRI, may repeat immediately prior if needed, Disp: 2 tablet, Rfl: 0    budesonide-formoterol (SYMBICORT) 160-4 5 mcg/act inhaler, Inhale 2 puffs 2 (two) times a day, Disp: , Rfl:     buPROPion (WELLBUTRIN SR) 150 mg 12 hr tablet, Take 150 mg by mouth 2 (two) times a day, Disp: , Rfl:     fluticasone (FLONASE) 50 mcg/act nasal spray, 1 spray into each nostril as needed  , Disp: , Rfl:     losartan 50 mg TABS 100 mg, hydrochlorothiazide 25 mg TABS 25 mg, Take 1 tablet by mouth every morning  , Disp: , Rfl:     montelukast (SINGULAIR) 10 mg tablet, Take 10 mg by mouth daily at bedtime, Disp: , Rfl:     spironolactone (ALDACTONE) 25 mg tablet, Take 25 mg by mouth as needed, Disp: , Rfl:     Tiotropium Bromide Monohydrate (SPIRIVA RESPIMAT) 1 25 MCG/ACT AERS, Inhale 2 Act every morning  , Disp: , Rfl:     traMADol (ULTRAM) 50 mg tablet, Take 1 tablet (50 mg total) by mouth every 6 (six) hours as needed for moderate pain, Disp: 60 tablet, Rfl: 0    VENTOLIN  (90 Base) MCG/ACT inhaler, INHALE 2 PUFFS FOUR TIMES DAILY AS DIRECTED , Disp: 18 Inhaler, Rfl: 3      Margie Ghosh DPM    Scribe Attestation    I,:    am acting as a scribe while in the presence of the attending physician :        I,:    personally performed the services described in this documentation    as scribed in my presence :

## 2018-04-25 LAB
CARDIOLIPIN IGA SER IA-ACNC: <9 APL U/ML (ref 0–11)
CARDIOLIPIN IGG SER IA-ACNC: <9 GPL U/ML (ref 0–14)
CARDIOLIPIN IGM SER IA-ACNC: 15 MPL U/ML (ref 0–12)

## 2018-04-26 ENCOUNTER — OFFICE VISIT (OUTPATIENT)
Dept: FAMILY MEDICINE CLINIC | Facility: CLINIC | Age: 53
End: 2018-04-26
Payer: COMMERCIAL

## 2018-04-26 VITALS
DIASTOLIC BLOOD PRESSURE: 110 MMHG | TEMPERATURE: 98.3 F | WEIGHT: 200.5 LBS | SYSTOLIC BLOOD PRESSURE: 130 MMHG | HEART RATE: 94 BPM | HEIGHT: 60 IN | RESPIRATION RATE: 16 BRPM | BODY MASS INDEX: 39.37 KG/M2

## 2018-04-26 DIAGNOSIS — I10 ESSENTIAL HYPERTENSION: Primary | ICD-10-CM

## 2018-04-26 DIAGNOSIS — B37.89 CANDIDIASIS OF BREAST: ICD-10-CM

## 2018-04-26 DIAGNOSIS — Z12.11 SCREENING FOR COLON CANCER: ICD-10-CM

## 2018-04-26 DIAGNOSIS — M17.0 OSTEOARTHRITIS OF BOTH KNEES, UNSPECIFIED OSTEOARTHRITIS TYPE: Primary | ICD-10-CM

## 2018-04-26 PROBLEM — Z01.810 PREOP CARDIOVASCULAR EXAM: Status: RESOLVED | Noted: 2018-02-23 | Resolved: 2018-04-26

## 2018-04-26 PROBLEM — D72.829 LEUKOCYTOSIS: Status: RESOLVED | Noted: 2018-03-08 | Resolved: 2018-04-26

## 2018-04-26 LAB
APTT SCREEN TO CONFIRM RATIO: 1.09 RATIO (ref 0–1.4)
CONFIRM APTT/NORMAL: 45.4 SEC (ref 0–55)
FACT XIIIA PPP-ACNC: 130 % (ref 57–163)
LA PPP-IMP: NORMAL
SCREEN APTT: 48.4 SEC (ref 0–51.9)
SCREEN DRVVT: 45.2 SEC (ref 0–47)
THROMBIN TIME: 16.5 SEC (ref 0–23)

## 2018-04-26 PROCEDURE — 3008F BODY MASS INDEX DOCD: CPT | Performed by: FAMILY MEDICINE

## 2018-04-26 PROCEDURE — 99214 OFFICE O/P EST MOD 30 MIN: CPT | Performed by: FAMILY MEDICINE

## 2018-04-26 RX ORDER — FOLIC ACID 1 MG/1
1000 TABLET ORAL DAILY
Refills: 3 | COMMUNITY
Start: 2018-03-30 | End: 2018-12-21 | Stop reason: ALTCHOICE

## 2018-04-26 RX ORDER — LOSARTAN POTASSIUM AND HYDROCHLOROTHIAZIDE 25; 100 MG/1; MG/1
1 TABLET ORAL DAILY
Qty: 30 TABLET | Refills: 5 | Status: SHIPPED | OUTPATIENT
Start: 2018-04-26 | End: 2018-11-25 | Stop reason: SDUPTHER

## 2018-04-26 RX ORDER — PREDNISONE 1 MG/1
5 TABLET ORAL
COMMUNITY
Start: 2018-04-11 | End: 2018-04-26 | Stop reason: CLARIF

## 2018-04-26 RX ORDER — NYSTATIN 500000 [USP'U]/1
1 TABLET, COATED ORAL EVERY 8 HOURS SCHEDULED
Qty: 30 TABLET | Refills: 0 | Status: SHIPPED | OUTPATIENT
Start: 2018-04-26 | End: 2018-05-06

## 2018-04-26 NOTE — PROGRESS NOTES
Assessment/Plan:    No problem-specific Assessment & Plan notes found for this encounter  Diagnoses and all orders for this visit:    Osteoarthritis of both knees, unspecified osteoarthritis type  -     Walker    Candidiasis of breast  -     nystatin (MYCOSTATIN) 500,000 units tablet; Take 1 tablet (500,000 Units total) by mouth every 8 (eight) hours for 10 days  -     econazole nitrate 1 % cream; Apply topically daily    Screening for colon cancer  -     Occult Bloood,Fecal Immunochemical; Future          Subjective:      Patient ID: Bard Merrill is a 47 y o  female  Rash   This is a recurrent problem  The current episode started more than 1 year ago  The problem has been gradually worsening since onset  The affected locations include the chest and torso  The rash is characterized by redness and itchiness  She was exposed to nothing  Pertinent negatives include no fever or shortness of breath  Treatments tried: nystatin  The treatment provided no relief  The following portions of the patient's history were reviewed and updated as appropriate: allergies, current medications, past family history, past medical history, past social history, past surgical history and problem list     Review of Systems   Constitutional: Positive for chills  Negative for fever  Respiratory: Negative for shortness of breath  Cardiovascular: Negative for chest pain  Musculoskeletal: Positive for arthralgias and neck pain  Shoulder and neck pain   Skin: Positive for rash  Hematological: Negative for adenopathy  Bruises/bleeds easily  Psychiatric/Behavioral: The patient is nervous/anxious            Objective:      BP (!) 130/110 (BP Location: Right arm, Patient Position: Sitting, Cuff Size: Adult)   Pulse 94   Temp 98 3 °F (36 8 °C) (Temporal)   Resp 16   Ht 5' 0 09" (1 526 m)   Wt 90 9 kg (200 lb 8 oz)   BMI 39 04 kg/m²          Physical Exam   Constitutional: She appears well-developed and well-nourished  Neck: No thyromegaly present  Cardiovascular: Normal rate, regular rhythm and normal heart sounds  Pulmonary/Chest: Breath sounds normal    Musculoskeletal: She exhibits no edema  Lymphadenopathy:     She has no cervical adenopathy  Skin: Rash noted  Rash is macular  Vitals reviewed

## 2018-04-27 ENCOUNTER — TELEPHONE (OUTPATIENT)
Dept: HEMATOLOGY ONCOLOGY | Facility: CLINIC | Age: 53
End: 2018-04-27

## 2018-04-27 DIAGNOSIS — R79.1 ELEVATED PARTIAL THROMBOPLASTIN TIME (PTT): Primary | ICD-10-CM

## 2018-04-27 LAB
B2 GLYCOPROT1 IGA SER-ACNC: <9 GPI IGA UNITS (ref 0–25)
B2 GLYCOPROT1 IGG SER-ACNC: <9 GPI IGG UNITS (ref 0–20)
B2 GLYCOPROT1 IGM SER-ACNC: <9 GPI IGM UNITS (ref 0–32)

## 2018-04-27 NOTE — TELEPHONE ENCOUNTER
Patient states Naeem Zuniga had labs run again to see if results changed  Patient had them done at [facility] 04/24/18  Please call patient with results    [phone]

## 2018-04-27 NOTE — TELEPHONE ENCOUNTER
Reviewed with Dr Lainez [facility]  Labs came back normal    He wants to repeat one more blood test, PTT mixing, since the other testing was negative  He will then be giving recommendations next week to patient and ortho regarding procedure  She needs to have performed at a hospital lab

## 2018-04-28 ENCOUNTER — APPOINTMENT (OUTPATIENT)
Dept: LAB | Facility: HOSPITAL | Age: 53
End: 2018-04-28
Payer: COMMERCIAL

## 2018-04-28 DIAGNOSIS — R79.1 ELEVATED PARTIAL THROMBOPLASTIN TIME (PTT): ICD-10-CM

## 2018-04-28 LAB
APTT 1H NP PPP: 36 SECONDS (ref 24–36)
APTT IMM NP PPP: 37 SECONDS (ref 24–36)
APTT PPP: 42 SECONDS (ref 23–35)

## 2018-04-28 PROCEDURE — 36415 COLL VENOUS BLD VENIPUNCTURE: CPT

## 2018-04-28 PROCEDURE — 85732 THROMBOPLASTIN TIME PARTIAL: CPT

## 2018-04-30 ENCOUNTER — TELEPHONE (OUTPATIENT)
Dept: HEMATOLOGY ONCOLOGY | Facility: CLINIC | Age: 53
End: 2018-04-30

## 2018-04-30 NOTE — TELEPHONE ENCOUNTER
Please call patient and let her know that she will need 2 bags of plasma prior to her procedure on 5/9/18  She will receive the morning of 5/9/18 prior to surgery  I have placed orders in [facility]  Please let orthopedics office know  She is having done with Dr Marleen Maciel

## 2018-04-30 NOTE — TELEPHONE ENCOUNTER
Called patient to make her aware that she will receive 2 bags of plasma prior to her procedure on 5/9/18  Patient is questioning why she needs the plasma and would like a call back from a doctor  Patient made aware that she needs to come to the [facility] office on Thursday or Friday to sign a blood consent  Patient verbally understood  Called and spoke to Dr Reese Castro office and made them aware that the patient will require platelet's prior to her procedure

## 2018-04-30 NOTE — TELEPHONE ENCOUNTER
Patient is coming in to the office at 12 noon on Friday 5/4/18  Dr Adelita Ritter will place orders for plasma after patient signs consent with him  She would like to discuss with Dr Adelita Ritter as well

## 2018-05-01 DIAGNOSIS — B37.89 CANDIDIASIS OF BREAST: ICD-10-CM

## 2018-05-02 ENCOUNTER — HOSPITAL ENCOUNTER (OUTPATIENT)
Dept: RADIOLOGY | Facility: HOSPITAL | Age: 53
Discharge: HOME | End: 2018-05-02
Payer: COMMERCIAL

## 2018-05-02 ENCOUNTER — OFFICE VISIT (OUTPATIENT)
Dept: OBGYN CLINIC | Facility: MEDICAL CENTER | Age: 53
End: 2018-05-02
Payer: COMMERCIAL

## 2018-05-02 VITALS
WEIGHT: 200 LBS | HEIGHT: 61 IN | HEART RATE: 102 BPM | DIASTOLIC BLOOD PRESSURE: 89 MMHG | BODY MASS INDEX: 37.76 KG/M2 | SYSTOLIC BLOOD PRESSURE: 128 MMHG

## 2018-05-02 DIAGNOSIS — G89.4 CHRONIC PAIN SYNDROME: Primary | ICD-10-CM

## 2018-05-02 DIAGNOSIS — M50.30 DDD (DEGENERATIVE DISC DISEASE), CERVICAL: ICD-10-CM

## 2018-05-02 DIAGNOSIS — M54.12 RADICULOPATHY, CERVICAL REGION: ICD-10-CM

## 2018-05-02 PROCEDURE — 99213 OFFICE O/P EST LOW 20 MIN: CPT | Performed by: ORTHOPAEDIC SURGERY

## 2018-05-02 PROCEDURE — 72141 MRI NECK SPINE W/O DYE: CPT

## 2018-05-02 RX ORDER — LORAZEPAM 1 MG/1
1 TABLET ORAL ONCE
Qty: 1 TABLET | Refills: 0 | Status: SHIPPED | OUTPATIENT
Start: 2018-05-02 | End: 2019-10-01 | Stop reason: ALTCHOICE

## 2018-05-02 NOTE — PROGRESS NOTES
Assessment/Plan:      Patient seen examined by Dr Gm Chicas and myself  Recent x-ray of the cervical spine reveals multilevel cervical degenerative disc disease  Her symptoms and physical examination could be multifactorial however she does have  Significant upper extremity weakness with a positive Martinez sign  We will order an MRI of the cervical spine without contrast ASAP for further evaluation  Follow-up in the office as soon as the study is completed for review  For chronic Pain Center we will refer her to the Pain Management team   She was instructed to call the office with any questions  Problem List Items Addressed This Visit     Radiculopathy, cervical region    DDD (degenerative disc disease), cervical    Relevant Medications    LORazepam (ATIVAN) 1 mg tablet    Other Relevant Orders    MRI cervical spine wo contrast      Other Visit Diagnoses     Chronic pain syndrome    -  Primary    Relevant Orders    Ambulatory referral to Pain Management            Subjective:      Patient ID: Kat Warner is a 47 y o  female  HPI     The patient is a 79-year-old female who presents for initial evaluation with Dr Gm Chicas  She was referred by Dr Mosetta Scheuermann for bilateral shoulder pain and chronic upper extremity weakness  Today the patient states that she has had chronic upper extremity weakness and shoulder pain for several years however this has been slowly getting worse over the last month  X-ray of the cervical spine showed multi-level cervical DDD  She states she has minimal neck pain however her neck range of motion is limited  She has occasional numbness of the ulnar digits both sides however this is rare and intermittent  She denies any bowel or bladder incontinence no saddle anesthesia  She stated she states that she has had several falls in the past and that her walking is off balance however she attributes this to her knee problems      Of note the patient has a history of traumatic brain injury 2014  He also has a history of falls however none recently  She denies any history of neck or lower back surgery  She follows with Rheumatology and Hematology as well  The following portions of the patient's history were reviewed and updated as appropriate: allergies, current medications, past family history, past medical history, past social history, past surgical history and problem list     Review of Systems   Constitutional: Negative for chills, diaphoresis, fatigue and fever  Respiratory: Negative for chest tightness, shortness of breath and wheezing  Cardiovascular: Negative for chest pain, palpitations and leg swelling  Gastrointestinal: Negative for abdominal pain, constipation and vomiting  Genitourinary: Negative for decreased urine volume and difficulty urinating  Musculoskeletal: Positive for arthralgias, back pain, gait problem, neck pain and neck stiffness  Skin: Negative for pallor, rash and wound  Neurological: Positive for weakness and numbness  Objective:      /89   Pulse 102   Ht 5' 1" (1 549 m)   Wt 90 7 kg (200 lb)   BMI 37 79 kg/m²          Physical Exam   Constitutional: She is oriented to person, place, and time  She appears well-developed and well-nourished  No distress  HENT:   Head: Normocephalic and atraumatic  Pulmonary/Chest: Effort normal  No respiratory distress  She has no wheezes  Musculoskeletal: She exhibits tenderness  Neurological: She is alert and oriented to person, place, and time  Skin: Skin is warm and dry  No rash noted  She is not diaphoretic  No erythema  No pallor  Psychiatric: She has a normal mood and affect  Nursing note and vitals reviewed  No acute distress  Gait is slow and wide-based  Most of the examination was performed in the seated position in the wheelchair  Inspection no open wound or erythema  She is tender to palpation trapezius muscles bilaterally   She has limited cervical range of motion  There is discomfort with Spurling's testing bilaterally  She has a positive Zane sign on the left  There is no sustained clonus no hyperreflexia noted  Babinski reflex down going toes  Upper extremity strength is 3/5 deltoid,  biceps triceps wrist extension and hand intrinsics  Sensation is equal and intact  Positive radial Pulses bilaterally

## 2018-05-03 ENCOUNTER — TELEPHONE (OUTPATIENT)
Dept: OBGYN CLINIC | Facility: HOSPITAL | Age: 53
End: 2018-05-03

## 2018-05-04 ENCOUNTER — OFFICE VISIT (OUTPATIENT)
Dept: OBGYN CLINIC | Facility: HOSPITAL | Age: 53
End: 2018-05-04
Payer: COMMERCIAL

## 2018-05-04 ENCOUNTER — APPOINTMENT (OUTPATIENT)
Dept: LAB | Facility: HOSPITAL | Age: 53
End: 2018-05-04
Payer: COMMERCIAL

## 2018-05-04 ENCOUNTER — TELEPHONE (OUTPATIENT)
Dept: OBGYN CLINIC | Facility: HOSPITAL | Age: 53
End: 2018-05-04

## 2018-05-04 ENCOUNTER — TRANSCRIBE ORDERS (OUTPATIENT)
Dept: LAB | Facility: HOSPITAL | Age: 53
End: 2018-05-04

## 2018-05-04 VITALS — HEART RATE: 90 BPM | DIASTOLIC BLOOD PRESSURE: 80 MMHG | SYSTOLIC BLOOD PRESSURE: 112 MMHG

## 2018-05-04 DIAGNOSIS — D69.6 THROMBOCYTOPENIA (HCC): Primary | ICD-10-CM

## 2018-05-04 DIAGNOSIS — M54.12 BRACHIAL NEURITIS: Primary | ICD-10-CM

## 2018-05-04 DIAGNOSIS — D69.6 THROMBOCYTOPENIA (HCC): ICD-10-CM

## 2018-05-04 DIAGNOSIS — M54.12 RADICULOPATHY, CERVICAL REGION: ICD-10-CM

## 2018-05-04 DIAGNOSIS — M54.12 RADICULOPATHY, CERVICAL REGION: Primary | ICD-10-CM

## 2018-05-04 DIAGNOSIS — Z01.818 PREOPERATIVE CLEARANCE: ICD-10-CM

## 2018-05-04 DIAGNOSIS — M50.30 DDD (DEGENERATIVE DISC DISEASE), CERVICAL: ICD-10-CM

## 2018-05-04 LAB
ABO GROUP BLD: NORMAL
ALBUMIN SERPL BCP-MCNC: 3.2 G/DL (ref 3.5–5)
ALP SERPL-CCNC: 74 U/L (ref 46–116)
ALT SERPL W P-5'-P-CCNC: 12 U/L (ref 12–78)
ANION GAP SERPL CALCULATED.3IONS-SCNC: 9 MMOL/L (ref 4–13)
APTT 1H NP PPP: 34 SECONDS (ref 24–36)
APTT IMM NP PPP: 34 SECONDS (ref 24–36)
APTT PPP: 36 SECONDS (ref 23–35)
APTT PPP: 38 SECONDS (ref 23–35)
AST SERPL W P-5'-P-CCNC: 11 U/L (ref 5–45)
BACTERIA UR QL AUTO: ABNORMAL /HPF
BASOPHILS # BLD AUTO: 0.04 THOUSANDS/ΜL (ref 0–0.1)
BASOPHILS NFR BLD AUTO: 1 % (ref 0–1)
BILIRUB SERPL-MCNC: 0.3 MG/DL (ref 0.2–1)
BILIRUB UR QL STRIP: NEGATIVE
BLD GP AB SCN SERPL QL: NEGATIVE
BUN SERPL-MCNC: 7 MG/DL (ref 5–25)
CALCIUM SERPL-MCNC: 9.7 MG/DL (ref 8.3–10.1)
CHLORIDE SERPL-SCNC: 101 MMOL/L (ref 100–108)
CLARITY UR: ABNORMAL
CO2 SERPL-SCNC: 27 MMOL/L (ref 21–32)
COLOR UR: YELLOW
CREAT SERPL-MCNC: 0.62 MG/DL (ref 0.6–1.3)
EOSINOPHIL # BLD AUTO: 0.05 THOUSAND/ΜL (ref 0–0.61)
EOSINOPHIL NFR BLD AUTO: 1 % (ref 0–6)
ERYTHROCYTE [DISTWIDTH] IN BLOOD BY AUTOMATED COUNT: 14.9 % (ref 11.6–15.1)
GFR SERPL CREATININE-BSD FRML MDRD: 103 ML/MIN/1.73SQ M
GLUCOSE SERPL-MCNC: 76 MG/DL (ref 65–140)
GLUCOSE UR STRIP-MCNC: NEGATIVE MG/DL
HCG SERPL QL: NEGATIVE
HCT VFR BLD AUTO: 37.2 % (ref 34.8–46.1)
HGB BLD-MCNC: 12 G/DL (ref 11.5–15.4)
HGB UR QL STRIP.AUTO: ABNORMAL
HYALINE CASTS #/AREA URNS LPF: ABNORMAL /LPF
INR PPP: 1.01 (ref 0.86–1.16)
KETONES UR STRIP-MCNC: NEGATIVE MG/DL
LEUKOCYTE ESTERASE UR QL STRIP: ABNORMAL
LYMPHOCYTES # BLD AUTO: 2.01 THOUSANDS/ΜL (ref 0.6–4.47)
LYMPHOCYTES NFR BLD AUTO: 29 % (ref 14–44)
MCH RBC QN AUTO: 26.5 PG (ref 26.8–34.3)
MCHC RBC AUTO-ENTMCNC: 32.3 G/DL (ref 31.4–37.4)
MCV RBC AUTO: 82 FL (ref 82–98)
MONOCYTES # BLD AUTO: 0.76 THOUSAND/ΜL (ref 0.17–1.22)
MONOCYTES NFR BLD AUTO: 11 % (ref 4–12)
NEUTROPHILS # BLD AUTO: 4.02 THOUSANDS/ΜL (ref 1.85–7.62)
NEUTS SEG NFR BLD AUTO: 58 % (ref 43–75)
NITRITE UR QL STRIP: POSITIVE
NON-SQ EPI CELLS URNS QL MICRO: ABNORMAL /HPF
NRBC BLD AUTO-RTO: 0 /100 WBCS
PH UR STRIP.AUTO: 5 [PH] (ref 4.5–8)
PLATELET # BLD AUTO: 656 THOUSANDS/UL (ref 149–390)
PMV BLD AUTO: 9 FL (ref 8.9–12.7)
POTASSIUM SERPL-SCNC: 3.3 MMOL/L (ref 3.5–5.3)
PROT SERPL-MCNC: 8.3 G/DL (ref 6.4–8.2)
PROT UR STRIP-MCNC: NEGATIVE MG/DL
PROTHROMBIN TIME: 13.3 SECONDS (ref 12.1–14.4)
RBC # BLD AUTO: 4.52 MILLION/UL (ref 3.81–5.12)
RBC #/AREA URNS AUTO: ABNORMAL /HPF
RH BLD: POSITIVE
SODIUM SERPL-SCNC: 137 MMOL/L (ref 136–145)
SP GR UR STRIP.AUTO: 1.02 (ref 1–1.03)
SPECIMEN EXPIRATION DATE: NORMAL
UROBILINOGEN UR QL STRIP.AUTO: 0.2 E.U./DL
WBC # BLD AUTO: 6.89 THOUSAND/UL (ref 4.31–10.16)
WBC #/AREA URNS AUTO: ABNORMAL /HPF

## 2018-05-04 PROCEDURE — 99214 OFFICE O/P EST MOD 30 MIN: CPT | Performed by: ORTHOPAEDIC SURGERY

## 2018-05-04 PROCEDURE — 85280 CLOT FACTOR XII HAGEMAN: CPT

## 2018-05-04 PROCEDURE — 86850 RBC ANTIBODY SCREEN: CPT

## 2018-05-04 PROCEDURE — 85730 THROMBOPLASTIN TIME PARTIAL: CPT

## 2018-05-04 PROCEDURE — 85610 PROTHROMBIN TIME: CPT

## 2018-05-04 PROCEDURE — 85025 COMPLETE CBC W/AUTO DIFF WBC: CPT

## 2018-05-04 PROCEDURE — 81001 URINALYSIS AUTO W/SCOPE: CPT | Performed by: ORTHOPAEDIC SURGERY

## 2018-05-04 PROCEDURE — 86901 BLOOD TYPING SEROLOGIC RH(D): CPT

## 2018-05-04 PROCEDURE — 86038 ANTINUCLEAR ANTIBODIES: CPT

## 2018-05-04 PROCEDURE — 36415 COLL VENOUS BLD VENIPUNCTURE: CPT

## 2018-05-04 PROCEDURE — 86900 BLOOD TYPING SEROLOGIC ABO: CPT

## 2018-05-04 PROCEDURE — 85260 CLOT FACTOR X STUART-POWER: CPT

## 2018-05-04 PROCEDURE — 80053 COMPREHEN METABOLIC PANEL: CPT

## 2018-05-04 PROCEDURE — 85732 THROMBOPLASTIN TIME PARTIAL: CPT

## 2018-05-04 PROCEDURE — 85250 CLOT FACTOR IX PTC/CHRSTMAS: CPT

## 2018-05-04 PROCEDURE — 85270 CLOT FACTOR XI PTA: CPT

## 2018-05-04 PROCEDURE — 84703 CHORIONIC GONADOTROPIN ASSAY: CPT

## 2018-05-04 NOTE — TELEPHONE ENCOUNTER
I did discuss the fractures in the shoulders with the patient and how they should heal on their own  She may me physical therapy for strengthening once there is healing and she does have a cervical radiculopathy issue at this time which also might cause some pain into her shoulders  The patient was under good understanding at this time  She did request something for pain and I did recommend anti-inflammatories at this time

## 2018-05-04 NOTE — TELEPHONE ENCOUNTER
Dr Oseas Moctezuma    Patient was in to see Dr Shannon Patterson today  Had a MRI done  Patient is asking if you were aware of the multiple FX in her shoulders she said that showed up today  She is asking for a call because she does not know what to do  Dr Shannon Patterson she stated mentioned surgery but she states she needs SX for rotator cuff SX also  She is thinking that the FX are also the reason she can not raise her arms  She doesn't know what to do about that either  Please call her @ [phone] to discuss  She does have a FU appt with you on 5 8 18 that I just scheduled for her

## 2018-05-04 NOTE — PROGRESS NOTES
Assessment/Plan:        Patient seen examined by Dr Naye Petersen and myself  MRI of the cervical spine without contrast reviewed and reveals multi-level degenerative disc disease with moderate to severe bilateral foraminal narrowing and moderate central canal stenosis most severe at levels C5-6 and C6-7  She is a candidate for ACDF C5-6 and C6-7  Risks and benefits explained to the patient, and she was made aware that the goal of surgery is not to take all her pain away, but to prevent worsening myelopathy  She will need pre-op medical clearance by PCP, as well as Heme/Onc and Rheumatology given her ongoing work-up  She will also need pre-op lab work and CXR/EKG  Follow-up in the office 2 weeks after surgery  She knows to call the office with any questions  Problem List Items Addressed This Visit     Radiculopathy, cervical region - Primary    DDD (degenerative disc disease), cervical            Subjective:      Patient ID: Daniela Robin is a 47 y o  female  HPI       The patient is a 65-year-old female who presents for MRI review  She was last seen earlier this week for chronic upper extremity weakness and shoulder pain which has slowly been getting worse  She was sent for an MRI of the cervical spine is here to review the study  She reports no new symptoms since last visit no bowel or bladder incontinence no saddle anesthesia  The following portions of the patient's history were reviewed and updated as appropriate: allergies, current medications, past family history, past medical history, past social history, past surgical history and problem list     Review of Systems   Constitutional: Negative for chills, diaphoresis, fatigue and fever  Respiratory: Negative for shortness of breath and wheezing  Cardiovascular: Negative for chest pain, palpitations and leg swelling  Gastrointestinal: Negative for abdominal pain, diarrhea, nausea and vomiting     Genitourinary: Negative for decreased urine volume and difficulty urinating  Musculoskeletal: Positive for arthralgias, back pain, gait problem, neck pain and neck stiffness  Skin: Negative for pallor, rash and wound  Neurological: Positive for weakness and numbness  Objective:      /80   Pulse 90          Physical Exam   Constitutional: She is oriented to person, place, and time  She appears well-developed  No distress  HENT:   Head: Normocephalic and atraumatic  Cardiovascular: Intact distal pulses  Pulmonary/Chest: Effort normal  No respiratory distress  She has no wheezes  She has no rales  Abdominal: Soft  Musculoskeletal: She exhibits no edema  Neurological: She is alert and oriented to person, place, and time  Skin: Skin is warm and dry  No rash noted  She is not diaphoretic  No erythema  No pallor  Psychiatric: She has a normal mood and affect  Nursing note and vitals reviewed  No acute distress  Gait is slow and wide-based  Most of the examination was performed in the seated position with her in a wheelchair  Inspection no open wounds or erythema  She is tender to palpation trapezius muscles bilaterally  She has limited cervical range of motion  There is discomfort with Spurling's test bilaterally  Positive Zane sign on left  There is no sustained clonus no hyperreflexia noted  Babinski reflex shows downgoing toes  Upper extremity strength is 3/5 deltoid biceps triceps wrist extension and hand intrinsics  Sensation is equal and intact  Positive radial pulses bilaterally

## 2018-05-06 ENCOUNTER — TELEPHONE (OUTPATIENT)
Dept: FAMILY MEDICINE CLINIC | Facility: CLINIC | Age: 53
End: 2018-05-06

## 2018-05-07 ENCOUNTER — TELEPHONE (OUTPATIENT)
Dept: OBGYN CLINIC | Facility: MEDICAL CENTER | Age: 53
End: 2018-05-07

## 2018-05-07 ENCOUNTER — TELEPHONE (OUTPATIENT)
Dept: FAMILY MEDICINE CLINIC | Facility: CLINIC | Age: 53
End: 2018-05-07

## 2018-05-07 DIAGNOSIS — F32.A DEPRESSION, UNSPECIFIED DEPRESSION TYPE: Primary | ICD-10-CM

## 2018-05-07 LAB
FACT IX ACT/NOR PPP: 131 % (ref 60–177)
FACT X ACT/NOR PPP: 146 % (ref 76–183)
FACT XI ACT/NOR PPP: 92 % (ref 60–150)
FACT XII ACT/NOR PPP: 81 % (ref 50–150)
RYE IGE QN: NEGATIVE

## 2018-05-07 RX ORDER — BUPROPION HYDROCHLORIDE 150 MG/1
TABLET, EXTENDED RELEASE ORAL
Qty: 180 TABLET | Refills: 2 | Status: SHIPPED | OUTPATIENT
Start: 2018-05-07 | End: 2019-02-13 | Stop reason: SDUPTHER

## 2018-05-07 NOTE — TELEPHONE ENCOUNTER
I spoke with Dr Donna Goodson regarding Maira Brown  He states that she has an increased bleeding time  I let him know that her knee arthroscopy is canceled for now but that we would likely proceed in the future  He let me know that further intervention is not needed for this type of surgery  If would more substantial surgery may be required at some point transfusion of FFP prior to the start of surgery is recommended

## 2018-05-07 NOTE — TELEPHONE ENCOUNTER
Pt called in and asked if you can give her a call back she will like to speak with you regarding a few concerns

## 2018-05-07 NOTE — TELEPHONE ENCOUNTER
Apparently has tears/fractures in both shoulders, also issues with neck and knee as well as blood clotting issues, shoulder injuries were in January, really having trouble getting around- and trouble with ADLs-seeing you tomorrow, any suggestions?

## 2018-05-08 ENCOUNTER — OFFICE VISIT (OUTPATIENT)
Dept: OBGYN CLINIC | Facility: MEDICAL CENTER | Age: 53
End: 2018-05-08
Payer: COMMERCIAL

## 2018-05-08 ENCOUNTER — DOCUMENTATION (OUTPATIENT)
Dept: HEMATOLOGY ONCOLOGY | Facility: CLINIC | Age: 53
End: 2018-05-08

## 2018-05-08 VITALS
BODY MASS INDEX: 36.47 KG/M2 | HEART RATE: 92 BPM | SYSTOLIC BLOOD PRESSURE: 125 MMHG | DIASTOLIC BLOOD PRESSURE: 85 MMHG | WEIGHT: 193 LBS

## 2018-05-08 DIAGNOSIS — M25.511 ACUTE PAIN OF BOTH SHOULDERS: Primary | ICD-10-CM

## 2018-05-08 DIAGNOSIS — M25.512 ACUTE PAIN OF BOTH SHOULDERS: Primary | ICD-10-CM

## 2018-05-08 PROCEDURE — 99213 OFFICE O/P EST LOW 20 MIN: CPT | Performed by: ORTHOPAEDIC SURGERY

## 2018-05-08 RX ORDER — TRAMADOL HYDROCHLORIDE 50 MG/1
50 TABLET ORAL EVERY 8 HOURS PRN
Qty: 30 TABLET | Refills: 0 | Status: SHIPPED | OUTPATIENT
Start: 2018-05-08 | End: 2018-12-21 | Stop reason: SDUPTHER

## 2018-05-08 NOTE — TELEPHONE ENCOUNTER
The patient is scheduling for cervical surgery in the next 4-6 weeks with Dr Ray Austin did saw her again today and reiterated that treatment for shoulders at this time should be to use them self-limiting  She has no rotator cuff tears and basically bone bruises on her proximal humerus that will heal on their own without treatment  Our recommendation at this time is to go ahead with cervical surgery then proceed with therapy for the neck and shoulder postoperatively and see how she progresses as we believe she will improve with the cervical treatment  She was given a prescription for tramadol for pain today

## 2018-05-08 NOTE — PROGRESS NOTES
I spoke with patient on 05/07/ 18 and gave her results of PTT and mixing study done on 05/04/ 18  PTT came back 36 and 38 with  Full correction to 34 on mixing study  She may not have an inhibitor or she could have a very weak inhibitor  I had spoken with patient's orthopedic surgeon and she did not feel patient's procedure will require fresh frozen plasma because she was going to have arthroscopy only and  That too was being postponed  I did not have results of factor levels at that time  All the 4 factors, 12 11 10 and 9 have come back within normal range  Negative CORNEL  Factor 8 was checked previously and that was    within normal range

## 2018-05-08 NOTE — PROGRESS NOTES
Orthopaedic Surgery - Office Note  Naomi Chino (80 y o  female)   : 1965   MRN: [id number]  Encounter Date: 2018    Chief Complaint   Patient presents with    Left Shoulder - Follow-up    Right Shoulder - Follow-up       Assessment / Plan  Cervical Radiculopathy with bilateral shoulder humeral head impacted fractures or bruises    · The patient has been evaluated with spine and Dr Garcia who is planning on doing surgery on her cervical spine in the next 4-6 weeks  · We did discuss in depth how her shoulder injuries are considered to be self-limiting at this time and that after she goes to next surgery if she has no improvements we will re-evaluate her shoulders  · Continue with ice and analgesics as needed for the shoulders  Patient was given a prescription for tramadol today  Return for Patient should call following spine surgery if no improvement in shoulders with time  History of Present Illness  Naomi Chino is a 47 y o  female who presents for re-evaluation of bilateral shoulder pain  She states that about 6 weeks ago she had a fall because her knee gave out  She is being treated by Dr Tc Sharp for her knees and more recently has seen Dr Martina Mace for her neck  She is scheduled for spine surgery in the next 4-6 weeks  She complains of continued pain in both shoulders at this time  She is unable to sleep on her back or sides due to the pain  Last visit it was discussed with her that these shoulder injuries would be self-limiting and we felt the majority of her pain was coming from her neck  Review of Systems  Pertinent items are noted in HPI  All other systems were reviewed and are negative  Physical Exam  /85   Pulse 92   Wt 87 5 kg (193 lb)   BMI 36 47 kg/m²   Cons: Appears well  No apparent distress  Psych: Alert  Oriented x3  Mood and affect normal   Eyes: PERRLA, EOMI  Resp: Normal effort  No audible wheezing or stridor  CV: Palpable pulse    No discernable arrhythmia  No LE edema  Lymph:  No palpable cervical, axillary, or inguinal lymphadenopathy  Skin: Warm  No palpable masses  No visible lesions  Neuro: Normal muscle tone  Normal and symmetric DTR's  Bilateral Shoulder Exam  Alignment / Posture:  Normal cervical alignment  Normal shoulder posture  Inspection:  No swelling  No edema  No erythema  No ecchymosis  No muscle atrophy  No deformity  Palpation:  moderate tenderness at B/L shoulder joints  No effusion  No warmth  ROM:  Shoulder FE 40  Shoulder ER 20  Shoulder IR hip  Strength:  Supraspinatus 4/5  Infraspinatus 4/5  Subscapularis 44/5  Stability:  No objective shoulder instability  Tests: (+) Wheeler  (+) Neer  (+) Drop arm  (+) Painful arc  (+) Belly press  (+) Cross-body adduction  Neurovascular:  Sensation intact in Ax/R/M/U nerve distributions  2+ radial pulse  Studies Reviewed  XR of cervical spine - C5-C6 impingement      XR of bilateral shoulder - No acute osseous abnormality  Right = Mild osteoarthritis as above  MRI of bilateral shoulder - No full-thickness rotator cuff tear  Acute nondisplaced impaction fracture posterior humeral head at the junction with the greater tuberosity  Procedures  No procedures today  Medical, Surgical, Family, and Social History  The patient's medical history, family history, and social history, were reviewed and updated as appropriate      Past Medical History:   Diagnosis Date    Abdominal hernia     Anxiety     Arthritis     o a  left knee    Asthma     Brain injury ([address] ) 2014    Short term memory problems; confusion; has affected depth perception," injury at work"    COPD (chronic obstructive pulmonary disease) ([address] )     CPAP (continuous positive airway pressure) dependence     Depression     Fatty liver     GERD (gastroesophageal reflux disease)     History of gestational diabetes     Was on insulin    History of laparoscopic adjustable gastric banding     History of recent fall     3/2018    Hypertension     Knee pain, right     Obese     Pneumonia     x1    RA [facility]oid arthritis) [facility] [address] [facility]     Risk for falls     Shortness of breath     "sometimes with asthma"    Shoulder pain, bilateral     pain started after a fall in     Sleep apnea     Vomiting        Past Surgical History:   Procedure Laterality Date    ANAL FISSURECTOMY       SECTION      x1    ELBOW SURGERY Left     FOOT SURGERY Bilateral     5th toes    LAPAROSCOPIC GASTRIC BANDING  2010    Dr Eleanor Woods, [facility] [place]    DE EGD TRANSORAL BIOPSY SINGLE/MULTIPLE N/A 1/3/2018    Procedure: ESOPHAGOGASTRODUODENOSCOPY (EGD) with biopsy;  Surgeon: Cherri Mar MD;  Location: [facility];   Service: Bariatrics       Family History   Problem Relation Age of Onset    Diabetes type II Mother     Hypertension Mother     Arthritis Mother     Congenital heart disease Mother     Stroke Mother     Diabetes type II Father     Hypertension Father     Asthma Father     Sudden death Other 28     niece    Asthma Sister     Asthma Brother     Seizures Son     Asthma Maternal Aunt        Social History     Occupational History    Unemployed "Looking for work"     Disabled      Social History Main Topics    Smoking status: Current Some Day Smoker     Packs/day: 0 15     Years: 3 00     Types: Cigarettes    Smokeless tobacco: Never Used      Comment: pt reports has cut back    Alcohol use Yes      Comment: social    Drug use: No    Sexual activity: Not on file       Allergies   Allergen Reactions    Sulfasalazine Rash    Other Rash     Adhesive tape and adhesive bandaids         Current Outpatient Prescriptions:     acetaminophen (TYLENOL) 650 mg CR tablet, Take 650 mg by mouth every 12 (twelve) hours, Disp: , Rfl:     albuterol (2 5 mg/3 mL) 0 083 % nebulizer solution, Take 2 5 mg by nebulization every 6 (six) hours as needed for wheezing, Disp: , Rfl:     ALPRAZolam [place]) 0 5 mg tablet, Take 1 tablet (0 5 mg total) by mouth 60 minutes pre-procedure 1 tab 1 hour before MRI, may repeat immediately prior if needed, Disp: 2 tablet, Rfl: 0    budesonide-formoterol (SYMBICORT) 160-4 5 mcg/act inhaler, Inhale 2 puffs 2 (two) times a day, Disp: , Rfl:     buPROPion (WELLBUTRIN SR) 150 mg 12 hr tablet, TAKE 1 TABLET EVERY 12 HOURS DAILY  , Disp: 180 tablet, Rfl: 2    econazole nitrate 1 % cream, Apply topically daily, Disp: 30 g, Rfl: 0    fluticasone (FLONASE) 50 mcg/act nasal spray, 1 spray into each nostril as needed  , Disp: , Rfl:     folic acid (FOLVITE) 1 mg tablet, Take 1,000 mcg by mouth daily, Disp: , Rfl: 3    losartan-hydrochlorothiazide (HYZAAR) 100-25 MG per tablet, Take 1 tablet by mouth daily, Disp: 30 tablet, Rfl: 5    montelukast (SINGULAIR) 10 mg tablet, Take 10 mg by mouth daily at bedtime, Disp: , Rfl:     spironolactone (ALDACTONE) 25 mg tablet, Take 25 mg by mouth as needed, Disp: , Rfl:     Tiotropium Bromide Monohydrate (SPIRIVA RESPIMAT) 1 25 MCG/ACT AERS, Inhale 2 Act every morning  , Disp: , Rfl:     traMADol (ULTRAM) 50 mg tablet, Take 1 tablet (50 mg total) by mouth every 6 (six) hours as needed for moderate pain, Disp: 60 tablet, Rfl: 0    VENTOLIN  (90 Base) MCG/ACT inhaler, INHALE 2 PUFFS FOUR TIMES DAILY AS DIRECTED , Disp: 18 Inhaler, Rfl: 3    LORazepam (ATIVAN) 1 mg tablet, Take 1 tablet (1 mg total) by mouth once for 1 dose Take 1 mg tablet one hour prior to MRI study  , Disp: 1 tablet, Rfl: 0    traMADol (ULTRAM) 50 mg tablet, Take 1 tablet (50 mg total) by mouth every 8 (eight) hours as needed for moderate pain, Disp: 30 tablet, Rfl: 0      Cyn Cain PA-C    Scribe Attestation    I,:    am acting as a scribe while in the presence of the attending physician :        I,:    personally performed the services described in this documentation    as scribed in my presence :

## 2018-05-09 ENCOUNTER — ANESTHESIA (OUTPATIENT)
Dept: PERIOP | Facility: HOSPITAL | Age: 53
End: 2018-05-09

## 2018-05-20 RX ORDER — TIOTROPIUM BROMIDE INHALATION SPRAY 1.56 UG/1
SPRAY, METERED RESPIRATORY (INHALATION)
Refills: 5 | OUTPATIENT
Start: 2018-05-20

## 2018-07-02 ENCOUNTER — APPOINTMENT (OUTPATIENT)
Dept: LAB | Facility: HOSPITAL | Age: 53
End: 2018-07-02
Payer: COMMERCIAL

## 2018-07-02 DIAGNOSIS — D72.829 LEUKOCYTOSIS, UNSPECIFIED TYPE: Primary | ICD-10-CM

## 2018-07-02 LAB
BASOPHILS # BLD AUTO: 0.05 THOUSANDS/ΜL (ref 0–0.1)
BASOPHILS NFR BLD AUTO: 0 % (ref 0–1)
EOSINOPHIL # BLD AUTO: 0.16 THOUSAND/ΜL (ref 0–0.61)
EOSINOPHIL NFR BLD AUTO: 1 % (ref 0–6)
ERYTHROCYTE [DISTWIDTH] IN BLOOD BY AUTOMATED COUNT: 18 % (ref 11.6–15.1)
HCT VFR BLD AUTO: 40.8 % (ref 34.8–46.1)
HGB BLD-MCNC: 13 G/DL (ref 11.5–15.4)
LYMPHOCYTES # BLD AUTO: 2.42 THOUSANDS/ΜL (ref 0.6–4.47)
LYMPHOCYTES NFR BLD AUTO: 20 % (ref 14–44)
MCH RBC QN AUTO: 28.4 PG (ref 26.8–34.3)
MCHC RBC AUTO-ENTMCNC: 31.9 G/DL (ref 31.4–37.4)
MCV RBC AUTO: 89 FL (ref 82–98)
MONOCYTES # BLD AUTO: 0.65 THOUSAND/ΜL (ref 0.17–1.22)
MONOCYTES NFR BLD AUTO: 5 % (ref 4–12)
NEUTROPHILS # BLD AUTO: 8.69 THOUSANDS/ΜL (ref 1.85–7.62)
NEUTS SEG NFR BLD AUTO: 73 % (ref 43–75)
NRBC BLD AUTO-RTO: 0 /100 WBCS
PLATELET # BLD AUTO: 391 THOUSANDS/UL (ref 149–390)
PMV BLD AUTO: 9.7 FL (ref 8.9–12.7)
RBC # BLD AUTO: 4.57 MILLION/UL (ref 3.81–5.12)
WBC # BLD AUTO: 11.97 THOUSAND/UL (ref 4.31–10.16)

## 2018-07-02 PROCEDURE — 85025 COMPLETE CBC W/AUTO DIFF WBC: CPT

## 2018-07-02 PROCEDURE — 36415 COLL VENOUS BLD VENIPUNCTURE: CPT

## 2018-07-05 ENCOUNTER — TELEPHONE (OUTPATIENT)
Dept: HEMATOLOGY ONCOLOGY | Facility: CLINIC | Age: 53
End: 2018-07-05

## 2018-07-05 DIAGNOSIS — D68.9 COAGULOPATHY (HCC): Primary | ICD-10-CM

## 2018-07-05 NOTE — TELEPHONE ENCOUNTER
Call from Gen Mckay at Dr [facility]) requesting notes and labs   Faxed to

## 2018-07-05 NOTE — TELEPHONE ENCOUNTER
Dr Jennifer Duncan spoke with the patient  Patient is being referred to [facility] Hematology Department for coagulopathy

## 2018-07-05 NOTE — TELEPHONE ENCOUNTER
[facility] calls to request recent notes (from 2018) on this patient that Dr Tim Langley referred to them

## 2018-07-05 NOTE — TELEPHONE ENCOUNTER
Merlinda Igo calls today regarding her abnormal lab work  She said Dr Neli Knowles mentioned some special tests that would be done in [facility]  She has been delaying surgeries (2 discs from her back/neck; and a lap band surgery) due to the abnormal labs  She has a f/u visit on the 23rd  Can something be done before that?

## 2018-07-06 ENCOUNTER — TELEPHONE (OUTPATIENT)
Dept: HEMATOLOGY ONCOLOGY | Facility: CLINIC | Age: 53
End: 2018-07-06

## 2018-07-06 NOTE — TELEPHONE ENCOUNTER
Call from [facility] with [facility]  Pt was incorrectly scheduled with Dr Juancho linares transplant MD  [facility] rescheduled pt with Dr Houston Parsons on 7/9/18 at 4:00 pm  [facility] attempted to reach out to pt about the change  Pt does not answer on have VM  Provided emergency contact's phone #  [facility] did receive pt records

## 2018-07-16 DIAGNOSIS — J45.30 MILD PERSISTENT ASTHMA WITHOUT COMPLICATION: ICD-10-CM

## 2018-10-29 DIAGNOSIS — J45.30 MILD PERSISTENT ASTHMA WITHOUT COMPLICATION: ICD-10-CM

## 2018-10-29 RX ORDER — ALBUTEROL SULFATE 2.5 MG/3ML
2.5 SOLUTION RESPIRATORY (INHALATION) EVERY 6 HOURS PRN
Qty: 60 VIAL | Refills: 2 | Status: SHIPPED | OUTPATIENT
Start: 2018-10-29 | End: 2019-07-12 | Stop reason: SDUPTHER

## 2018-10-29 RX ORDER — ALBUTEROL SULFATE 90 UG/1
2 AEROSOL, METERED RESPIRATORY (INHALATION) 4 TIMES DAILY
Qty: 3 INHALER | Refills: 1 | Status: SHIPPED | OUTPATIENT
Start: 2018-10-29 | End: 2019-03-14 | Stop reason: SDUPTHER

## 2018-10-29 NOTE — TELEPHONE ENCOUNTER
Patient called in for refill on  VENTOLIN  (90 Base) MCG/ACT inhaler  albuterol (2 5 mg/3 mL) 0 083 % nebulizer solution  Sent to [facility] on file   Last ov was 04/26/18

## 2018-11-18 RX ORDER — TIOTROPIUM BROMIDE INHALATION SPRAY 1.56 UG/1
SPRAY, METERED RESPIRATORY (INHALATION)
Refills: 5 | OUTPATIENT
Start: 2018-11-18

## 2018-11-25 DIAGNOSIS — I10 ESSENTIAL HYPERTENSION: ICD-10-CM

## 2018-11-26 RX ORDER — LOSARTAN POTASSIUM AND HYDROCHLOROTHIAZIDE 25; 100 MG/1; MG/1
1 TABLET ORAL DAILY
Qty: 30 TABLET | Refills: 5 | Status: SHIPPED | OUTPATIENT
Start: 2018-11-26 | End: 2019-06-09 | Stop reason: SDUPTHER

## 2018-12-12 RX ORDER — PREDNISONE 20 MG/1
TABLET ORAL
Refills: 0 | COMMUNITY
Start: 2018-12-08 | End: 2019-09-16 | Stop reason: SDUPTHER

## 2018-12-13 ENCOUNTER — OFFICE VISIT (OUTPATIENT)
Dept: FAMILY MEDICINE CLINIC | Facility: CLINIC | Age: 53
End: 2018-12-13
Payer: COMMERCIAL

## 2018-12-13 VITALS
OXYGEN SATURATION: 97 % | WEIGHT: 210 LBS | DIASTOLIC BLOOD PRESSURE: 82 MMHG | RESPIRATION RATE: 20 BRPM | TEMPERATURE: 98 F | BODY MASS INDEX: 39.65 KG/M2 | HEART RATE: 84 BPM | HEIGHT: 61 IN | SYSTOLIC BLOOD PRESSURE: 118 MMHG

## 2018-12-13 DIAGNOSIS — J02.9 ACUTE PHARYNGITIS, UNSPECIFIED ETIOLOGY: ICD-10-CM

## 2018-12-13 DIAGNOSIS — J45.30 MILD PERSISTENT ASTHMA WITHOUT COMPLICATION: ICD-10-CM

## 2018-12-13 DIAGNOSIS — B36.9 FUNGAL SKIN INFECTION: ICD-10-CM

## 2018-12-13 DIAGNOSIS — J20.9 ACUTE BRONCHITIS, UNSPECIFIED ORGANISM: Primary | ICD-10-CM

## 2018-12-13 DIAGNOSIS — Z12.39 SCREENING FOR BREAST CANCER: ICD-10-CM

## 2018-12-13 PROCEDURE — 4004F PT TOBACCO SCREEN RCVD TLK: CPT | Performed by: NURSE PRACTITIONER

## 2018-12-13 PROCEDURE — 99213 OFFICE O/P EST LOW 20 MIN: CPT | Performed by: NURSE PRACTITIONER

## 2018-12-13 PROCEDURE — 3008F BODY MASS INDEX DOCD: CPT | Performed by: NURSE PRACTITIONER

## 2018-12-13 RX ORDER — AMOXICILLIN AND CLAVULANATE POTASSIUM 875; 125 MG/1; MG/1
1 TABLET, FILM COATED ORAL EVERY 12 HOURS SCHEDULED
Qty: 20 TABLET | Refills: 0 | Status: SHIPPED | OUTPATIENT
Start: 2018-12-13 | End: 2018-12-23

## 2018-12-13 RX ORDER — PROMETHAZINE HYDROCHLORIDE AND CODEINE PHOSPHATE 6.25; 1 MG/5ML; MG/5ML
5 SYRUP ORAL
Qty: 120 ML | Refills: 0 | Status: SHIPPED | OUTPATIENT
Start: 2018-12-13 | End: 2019-08-20 | Stop reason: ALTCHOICE

## 2018-12-13 RX ORDER — NYSTATIN AND TRIAMCINOLONE ACETONIDE 100000; 1 [USP'U]/G; MG/G
OINTMENT TOPICAL 2 TIMES DAILY
Qty: 60 G | Refills: 0 | Status: SHIPPED | OUTPATIENT
Start: 2018-12-13 | End: 2019-08-20 | Stop reason: CLARIF

## 2018-12-13 RX ORDER — MONTELUKAST SODIUM 10 MG/1
10 TABLET ORAL
Qty: 90 TABLET | Refills: 1 | Status: SHIPPED | OUTPATIENT
Start: 2018-12-13 | End: 2020-03-13 | Stop reason: SDUPTHER

## 2018-12-13 NOTE — PROGRESS NOTES
Chief Complaint   Patient presents with    Cough     pt  c/o persistent dry cough x1 week p seeing the E/R Pt was prescribed a high dose of Prednisone       Assessment/Plan:     Diagnoses and all orders for this visit:    Acute bronchitis, unspecified organism  -     amoxicillin-clavulanate (AUGMENTIN) 875-125 mg per tablet; Take 1 tablet by mouth every 12 (twelve) hours for 10 days  -     promethazine-codeine (PHENERGAN WITH CODEINE) 6 25-10 mg/5 mL syrup; Take 5 mL by mouth daily at bedtime as needed for cough    Acute pharyngitis, unspecified etiology  -     amoxicillin-clavulanate (AUGMENTIN) 875-125 mg per tablet; Take 1 tablet by mouth every 12 (twelve) hours for 10 days  -     promethazine-codeine (PHENERGAN WITH CODEINE) 6 25-10 mg/5 mL syrup; Take 5 mL by mouth daily at bedtime as needed for cough    Screening for breast cancer  -     Mammo screening bilateral w cad; Future    Mild persistent asthma without complication  -     montelukast (SINGULAIR) 10 mg tablet; Take 1 tablet (10 mg total) by mouth daily at bedtime    Fungal skin infection  -     nystatin-triamcinolone (MYCOLOG-II) ointment; Apply topically 2 (two) times a day          Subjective:      Patient ID: Tereza Saldaña is a 47 y o  female  Patient states she went to urgent care and they increased her prednisone but it has not really helped  She had to increase her nebulizer use because she is feeling Short of breath  Patient states she was being treat for a circular lesion under her breast and the cream, is not working and it is bigger  Cough   This is a new problem  The current episode started 1 to 4 weeks ago  The problem has been gradually worsening  Associated symptoms include headaches, nasal congestion, postnasal drip, rhinorrhea, a sore throat, shortness of breath and wheezing  Pertinent negatives include no chills, ear congestion or ear pain  Nothing aggravates the symptoms   She has tried prescription cough suppressant, oral steroids and a beta-agonist inhaler for the symptoms  The treatment provided mild relief  The following portions of the patient's history were reviewed and updated as appropriate: allergies, current medications, past family history, past medical history, past social history, past surgical history and problem list     Review of Systems   Constitutional: Negative for chills  HENT: Positive for postnasal drip, rhinorrhea and sore throat  Negative for ear pain  Respiratory: Positive for cough, shortness of breath and wheezing  Neurological: Positive for headaches  Objective:      /82 (BP Location: Left arm, Patient Position: Sitting, Cuff Size: Large)   Pulse 84   Temp 98 °F (36 7 °C) (Temporal)   Resp 20   Ht 5' 1" (1 549 m)   Wt 95 3 kg (210 lb)   SpO2 97%   BMI 39 68 kg/m²          Physical Exam   Constitutional: She is oriented to person, place, and time  She appears well-developed and well-nourished  She appears ill  HENT:   Head: Normocephalic and atraumatic  Right Ear: Tympanic membrane normal    Left Ear: Tympanic membrane normal    Nose: Mucosal edema present  Mouth/Throat: Posterior oropharyngeal erythema present  No oropharyngeal exudate or posterior oropharyngeal edema  Cardiovascular: Normal rate, regular rhythm and normal heart sounds  Pulmonary/Chest: Effort normal  She has no decreased breath sounds  She has wheezes in the right upper field and the right middle field  hacking cough   Lymphadenopathy:     She has cervical adenopathy  Neurological: She is alert and oriented to person, place, and time  Skin:   Right breast fungal dermatitis    Nursing note and vitals reviewed

## 2018-12-18 DIAGNOSIS — Z12.11 SCREENING FOR COLON CANCER: Primary | ICD-10-CM

## 2018-12-21 ENCOUNTER — OFFICE VISIT (OUTPATIENT)
Dept: FAMILY MEDICINE CLINIC | Facility: CLINIC | Age: 53
End: 2018-12-21
Payer: COMMERCIAL

## 2018-12-21 VITALS
OXYGEN SATURATION: 96 % | DIASTOLIC BLOOD PRESSURE: 92 MMHG | WEIGHT: 213 LBS | TEMPERATURE: 98.3 F | BODY MASS INDEX: 40.22 KG/M2 | RESPIRATION RATE: 18 BRPM | HEIGHT: 61 IN | SYSTOLIC BLOOD PRESSURE: 140 MMHG | HEART RATE: 88 BPM

## 2018-12-21 DIAGNOSIS — R07.9 CHEST PAIN, UNSPECIFIED TYPE: ICD-10-CM

## 2018-12-21 DIAGNOSIS — J45.30 MILD PERSISTENT ASTHMA WITHOUT COMPLICATION: Primary | ICD-10-CM

## 2018-12-21 DIAGNOSIS — S29.019A THORACIC MYOFASCIAL STRAIN, INITIAL ENCOUNTER: ICD-10-CM

## 2018-12-21 PROBLEM — D68.9 COAGULOPATHY (HCC): Status: ACTIVE | Noted: 2018-08-08

## 2018-12-21 PROBLEM — D47.3 ESSENTIAL THROMBOCYTOSIS (HCC): Status: ACTIVE | Noted: 2018-04-05

## 2018-12-21 PROCEDURE — 4004F PT TOBACCO SCREEN RCVD TLK: CPT | Performed by: FAMILY MEDICINE

## 2018-12-21 PROCEDURE — 99213 OFFICE O/P EST LOW 20 MIN: CPT | Performed by: FAMILY MEDICINE

## 2018-12-21 PROCEDURE — 3008F BODY MASS INDEX DOCD: CPT | Performed by: FAMILY MEDICINE

## 2018-12-21 RX ORDER — CYCLOBENZAPRINE HCL 10 MG
10 TABLET ORAL 3 TIMES DAILY PRN
Qty: 30 TABLET | Refills: 1 | Status: SHIPPED | OUTPATIENT
Start: 2018-12-21 | End: 2019-10-29 | Stop reason: ALTCHOICE

## 2018-12-21 NOTE — PROGRESS NOTES
Assessment/Plan:    No problem-specific Assessment & Plan notes found for this encounter  There are no diagnoses linked to this encounter  Subjective:      Patient ID: Tereza Saldaña is a 46 y o  female  Chest Pain    This is a new problem  The current episode started 1 to 4 weeks ago  The onset quality is undetermined  The problem occurs intermittently  The problem has been unchanged  The pain is present in the lateral region  The pain is at a severity of 4/10  The pain is mild  The quality of the pain is described as burning and stabbing (trouble catching breath)  The pain radiates to the right shoulder and left shoulder  Associated symptoms include back pain and shortness of breath  Pertinent negatives include no headaches, palpitations or sputum production  The pain is aggravated by breathing  She has tried acetaminophen for the symptoms  The treatment provided mild relief  Risk factors include smoking/tobacco exposure  The following portions of the patient's history were reviewed and updated as appropriate: allergies, current medications, past family history, past medical history, past social history, past surgical history and problem list       Review of Systems   Constitutional: Negative for fatigue  Respiratory: Positive for shortness of breath  Negative for sputum production  Cardiovascular: Positive for chest pain  Negative for palpitations  Musculoskeletal: Positive for arthralgias and back pain  Shoulder pain   Neurological: Negative for headaches  Objective:      /92 (BP Location: Left arm, Patient Position: Sitting, Cuff Size: Large)   Pulse 88   Temp 98 3 °F (36 8 °C) (Temporal)   Resp 18   Ht 5' 1" (1 549 m)   Wt 96 6 kg (213 lb)   SpO2 96%   BMI 40 25 kg/m²          Physical Exam   Constitutional: She appears well-developed and well-nourished  Neck: No thyromegaly present  Cardiovascular: Normal rate, regular rhythm and normal heart sounds  Pulmonary/Chest: Breath sounds normal    Musculoskeletal: She exhibits tenderness  Pain over thoracic spine   Lymphadenopathy:     She has no cervical adenopathy  Vitals reviewed

## 2018-12-21 NOTE — PATIENT INSTRUCTIONS
rec muscle relaxant and pt eval for presumed thoracic strain, wean down on prednisone, take 40 mg/day for 3 days (2 tabs)   30 mg/day for 3 days (20 mg and two 5's), 20 mg/day for 3 days (1 tab), then back down to 10 mg for a week (2 of 5's) and then 7 5 mg/day ( 1 1/2 tabs of 5 mg)

## 2019-02-13 DIAGNOSIS — F32.A DEPRESSION, UNSPECIFIED DEPRESSION TYPE: ICD-10-CM

## 2019-02-13 RX ORDER — BUPROPION HYDROCHLORIDE 150 MG/1
TABLET, EXTENDED RELEASE ORAL
Qty: 180 TABLET | Refills: 1 | Status: SHIPPED | OUTPATIENT
Start: 2019-02-13 | End: 2019-08-17 | Stop reason: SDUPTHER

## 2019-03-13 DIAGNOSIS — J45.30 MILD PERSISTENT ASTHMA WITHOUT COMPLICATION: Primary | ICD-10-CM

## 2019-03-13 RX ORDER — TIOTROPIUM BROMIDE INHALATION SPRAY 1.56 UG/1
SPRAY, METERED RESPIRATORY (INHALATION)
Qty: 1 INHALER | Refills: 5 | Status: SHIPPED | OUTPATIENT
Start: 2019-03-13 | End: 2019-09-17 | Stop reason: SDUPTHER

## 2019-03-14 DIAGNOSIS — J45.30 MILD PERSISTENT ASTHMA WITHOUT COMPLICATION: ICD-10-CM

## 2019-03-14 RX ORDER — ALBUTEROL SULFATE 90 UG/1
2 AEROSOL, METERED RESPIRATORY (INHALATION) 4 TIMES DAILY
Qty: 3 INHALER | Refills: 1 | Status: SHIPPED | OUTPATIENT
Start: 2019-03-14 | End: 2019-08-20 | Stop reason: CLARIF

## 2019-06-08 DIAGNOSIS — I10 ESSENTIAL HYPERTENSION: ICD-10-CM

## 2019-06-09 DIAGNOSIS — I10 ESSENTIAL HYPERTENSION: ICD-10-CM

## 2019-06-09 RX ORDER — LOSARTAN POTASSIUM AND HYDROCHLOROTHIAZIDE 25; 100 MG/1; MG/1
1 TABLET ORAL DAILY
Qty: 30 TABLET | Refills: 5 | OUTPATIENT
Start: 2019-06-09

## 2019-06-09 RX ORDER — LOSARTAN POTASSIUM AND HYDROCHLOROTHIAZIDE 25; 100 MG/1; MG/1
1 TABLET ORAL DAILY
Qty: 30 TABLET | Refills: 0 | Status: SHIPPED | OUTPATIENT
Start: 2019-06-09 | End: 2019-07-17 | Stop reason: SDUPTHER

## 2019-07-07 DIAGNOSIS — J45.30 MILD PERSISTENT ASTHMA WITHOUT COMPLICATION: ICD-10-CM

## 2019-07-08 RX ORDER — MONTELUKAST SODIUM 10 MG/1
10 TABLET ORAL
Qty: 90 TABLET | Refills: 1 | OUTPATIENT
Start: 2019-07-08

## 2019-07-12 DIAGNOSIS — J45.30 MILD PERSISTENT ASTHMA WITHOUT COMPLICATION: ICD-10-CM

## 2019-07-12 RX ORDER — ALBUTEROL SULFATE 2.5 MG/3ML
2.5 SOLUTION RESPIRATORY (INHALATION) EVERY 6 HOURS PRN
Qty: 60 VIAL | Refills: 0 | Status: SHIPPED | OUTPATIENT
Start: 2019-07-12 | End: 2019-09-03 | Stop reason: SDUPTHER

## 2019-07-17 DIAGNOSIS — I10 ESSENTIAL HYPERTENSION: ICD-10-CM

## 2019-07-17 RX ORDER — LOSARTAN POTASSIUM AND HYDROCHLOROTHIAZIDE 25; 100 MG/1; MG/1
TABLET ORAL
Qty: 30 TABLET | Refills: 0 | Status: SHIPPED | OUTPATIENT
Start: 2019-07-17 | End: 2019-08-15 | Stop reason: SDUPTHER

## 2019-08-15 DIAGNOSIS — I10 ESSENTIAL HYPERTENSION: ICD-10-CM

## 2019-08-15 RX ORDER — LOSARTAN POTASSIUM AND HYDROCHLOROTHIAZIDE 25; 100 MG/1; MG/1
TABLET ORAL
Qty: 30 TABLET | Refills: 0 | Status: SHIPPED | OUTPATIENT
Start: 2019-08-15 | End: 2019-08-20 | Stop reason: SDUPTHER

## 2019-08-17 DIAGNOSIS — F32.A DEPRESSION, UNSPECIFIED DEPRESSION TYPE: ICD-10-CM

## 2019-08-18 RX ORDER — BUPROPION HYDROCHLORIDE 150 MG/1
TABLET, EXTENDED RELEASE ORAL
Qty: 180 TABLET | Refills: 1 | Status: SHIPPED | OUTPATIENT
Start: 2019-08-18 | End: 2019-08-20 | Stop reason: SDUPTHER

## 2019-08-20 ENCOUNTER — OFFICE VISIT (OUTPATIENT)
Dept: FAMILY MEDICINE CLINIC | Facility: CLINIC | Age: 54
End: 2019-08-20
Payer: COMMERCIAL

## 2019-08-20 VITALS
RESPIRATION RATE: 18 BRPM | BODY MASS INDEX: 38.51 KG/M2 | WEIGHT: 204 LBS | SYSTOLIC BLOOD PRESSURE: 130 MMHG | TEMPERATURE: 97.9 F | HEIGHT: 61 IN | HEART RATE: 102 BPM | DIASTOLIC BLOOD PRESSURE: 74 MMHG

## 2019-08-20 DIAGNOSIS — F32.A DEPRESSION, UNSPECIFIED DEPRESSION TYPE: ICD-10-CM

## 2019-08-20 DIAGNOSIS — M17.0 PRIMARY OSTEOARTHRITIS OF BOTH KNEES: Primary | ICD-10-CM

## 2019-08-20 DIAGNOSIS — M06.00 SERONEGATIVE EROSIVE RHEUMATOID ARTHRITIS (HCC): ICD-10-CM

## 2019-08-20 DIAGNOSIS — L30.9 DERMATITIS: ICD-10-CM

## 2019-08-20 DIAGNOSIS — G47.30 SLEEP APNEA, UNSPECIFIED TYPE: ICD-10-CM

## 2019-08-20 DIAGNOSIS — L60.3 DYSTROPHIC NAIL: ICD-10-CM

## 2019-08-20 DIAGNOSIS — J45.30 MILD PERSISTENT ASTHMA WITHOUT COMPLICATION: Primary | ICD-10-CM

## 2019-08-20 DIAGNOSIS — Z11.59 ENCOUNTER FOR HEPATITIS C SCREENING TEST FOR LOW RISK PATIENT: ICD-10-CM

## 2019-08-20 DIAGNOSIS — I10 ESSENTIAL HYPERTENSION: ICD-10-CM

## 2019-08-20 PROBLEM — D72.829 LEUKOCYTOSIS: Status: RESOLVED | Noted: 2018-03-08 | Resolved: 2019-08-20

## 2019-08-20 PROBLEM — M25.569 KNEE PAIN: Status: ACTIVE | Noted: 2019-08-20

## 2019-08-20 PROCEDURE — 3075F SYST BP GE 130 - 139MM HG: CPT | Performed by: FAMILY MEDICINE

## 2019-08-20 PROCEDURE — 4004F PT TOBACCO SCREEN RCVD TLK: CPT | Performed by: FAMILY MEDICINE

## 2019-08-20 PROCEDURE — 99214 OFFICE O/P EST MOD 30 MIN: CPT | Performed by: FAMILY MEDICINE

## 2019-08-20 PROCEDURE — 3008F BODY MASS INDEX DOCD: CPT | Performed by: FAMILY MEDICINE

## 2019-08-20 RX ORDER — CLOTRIMAZOLE AND BETAMETHASONE DIPROPIONATE 10; .64 MG/G; MG/G
CREAM TOPICAL 2 TIMES DAILY
Qty: 30 G | Refills: 0 | Status: SHIPPED | OUTPATIENT
Start: 2019-08-20 | End: 2021-06-10

## 2019-08-20 RX ORDER — LEVALBUTEROL TARTRATE 45 UG/1
1-2 AEROSOL, METERED ORAL EVERY 4 HOURS PRN
Qty: 1 INHALER | Refills: 5 | Status: SHIPPED | OUTPATIENT
Start: 2019-08-20 | End: 2020-03-06 | Stop reason: SDUPTHER

## 2019-08-20 RX ORDER — BUPROPION HYDROCHLORIDE 150 MG/1
TABLET, EXTENDED RELEASE ORAL
Qty: 90 TABLET | Refills: 5 | Status: SHIPPED | OUTPATIENT
Start: 2019-08-20

## 2019-08-20 RX ORDER — LOSARTAN POTASSIUM AND HYDROCHLOROTHIAZIDE 25; 100 MG/1; MG/1
1 TABLET ORAL DAILY
Qty: 30 TABLET | Refills: 5 | Status: SHIPPED | OUTPATIENT
Start: 2019-08-20 | End: 2019-12-03 | Stop reason: CLARIF

## 2019-08-20 NOTE — PROGRESS NOTES
Assessment/Plan:    Depression  INCREASE TO 2 WELLBUTRIN IN AM, 1 PM    Sleep apnea  Needs new sleep study-requesting home study    Mild persistent asthma without complication  Wants to change back to xopenex inhaler    Hypertension  BP stable    Seronegative erosive rheumatoid arthritis ([address] )  Await lab and rheum eval       Diagnoses and all orders for this visit:    Primary osteoarthritis of both knees  -     Sedimentation rate, automated; Future    Seronegative erosive rheumatoid arthritis (HCC)  -     CBC and differential; Future  -     Sedimentation rate, automated; Future  -     C-reactive protein; Future  -     CORNEL Screen w/ Reflex to Titer/Pattern; Future  -     RF Screen w/ Reflex to Titer; Future  -     Vitamin D 25 hydroxy; Future    Essential hypertension  -     Comprehensive metabolic panel; Future  -     losartan-hydrochlorothiazide (HYZAAR) 100-25 MG per tablet; Take 1 tablet by mouth daily    Dystrophic nail  -     TSH, 3rd generation; Future    Depression, unspecified depression type  -     buPROPion (WELLBUTRIN SR) 150 mg 12 hr tablet; 2 am, 1 pm    Dermatitis  -     clotrimazole-betamethasone (LOTRISONE) 1-0 05 % cream; Apply topically 2 (two) times a day    Encounter for hepatitis C screening test for low risk patient  -     Hepatitis C antibody; Future    BMI 38 0-38 9,adult    Sleep apnea, unspecified type  -     Home Study; Future          Subjective:      Patient ID: John Paul Teague is a 46 y o  female  Here for rash a month due to jitterbug bites, on 10 mg prednisone, knees really hurting,nails splitting, here also for BP check-has lost some weight  doesn't want to go out      The following portions of the patient's history were reviewed and updated as appropriate: allergies, current medications, past family history, past medical history, past social history, past surgical history and problem list     Review of Systems   Constitutional: Positive for unexpected weight change   Negative for activity change and appetite change  9 lb loss   Respiratory: Positive for cough and wheezing  Cardiovascular: Negative for chest pain  Skin: Positive for rash  Neurological: Negative for dizziness and headaches  Psychiatric/Behavioral: Positive for dysphoric mood  The patient is nervous/anxious  Objective:      /74 (BP Location: Left arm, Patient Position: Sitting, Cuff Size: Standard)   Pulse 102   Temp 97 9 °F (36 6 °C) (Temporal)   Resp 18   Ht 5' 1" (1 549 m)   Wt 92 5 kg (204 lb)   BMI 38 55 kg/m²          Physical Exam   Constitutional: She appears well-developed and well-nourished  Neck: No thyromegaly present  Cardiovascular: Normal rate, regular rhythm and normal heart sounds  Pulmonary/Chest: Effort normal and breath sounds normal    Lymphadenopathy:     She has no cervical adenopathy  Skin: Rash noted  Vitals reviewed  BMI Counseling: Body mass index is 38 55 kg/m²  Discussed the patient's BMI with her  The BMI is above average  BMI counseling and education was provided to the patient  Nutrition recommendations include reducing portion sizes, decreasing overall calorie intake, 3-5 servings of fruits/vegetables daily, reducing fast food intake and consuming healthier snacks  Exercise recommendations include exercising 3-5 times per week

## 2019-08-20 NOTE — PATIENT INSTRUCTIONS
Change wellbutrin to 2 am, 1pm, rec tetanus shot at pharmacy, needs to do lab and mammogram,change inhaler back to xopenex, refill blood pressure medication, change cream to lotrisone   Obesity   AMBULATORY CARE:   Obesity  is when your body mass index (BMI) is greater than 30  Your healthcare provider will use your height and weight to measure your BMI  The risks of obesity include  many health problems, such as injuries or physical disability  You may need tests to check for the following:  · Diabetes     · High blood pressure or high cholesterol     · Heart disease     · Gallbladder or liver disease     · Cancer of the colon, breast, prostate, liver, or kidney     · Sleep apnea     · Arthritis or gout  Seek care immediately if:   · You have a severe headache, confusion, or difficulty speaking  · You have weakness on one side of your body  · You have chest pain, sweating, or shortness of breath  Contact your healthcare provider if:   · You have symptoms of gallbladder or liver disease, such as pain in your upper abdomen  · You have knee or hip pain and discomfort while walking  · You have symptoms of diabetes, such as intense hunger and thirst, and frequent urination  · You have symptoms of sleep apnea, such as snoring or daytime sleepiness  · You have questions or concerns about your condition or care  Treatment for obesity  focuses on helping you lose weight to improve your health  Even a small decrease in BMI can reduce the risk for many health problems  Your healthcare provider will help you set a weight-loss goal   · Lifestyle changes  are the first step in treating obesity  These include making healthy food choices and getting regular physical activity  Your healthcare provider may suggest a weight-loss program that involves coaching, education, and therapy  · Medicine  may help you lose weight when it is used with a healthy diet and physical activity       · Surgery  can help you lose weight if you are very obese and have other health problems  There are several types of weight-loss surgery  Ask your healthcare provider for more information  Be successful losing weight:   · Set small, realistic goals  An example of a small goal is to walk for 20 minutes 5 days a week  Deshaun goal is to lose 5% of your body weight  · Tell friends, family members, and coworkers about your goals  and ask for their support  Ask a friend to lose weight with you, or join a weight-loss support group  · Identify foods or triggers that may cause you to overeat , and find ways to avoid them  Remove tempting high-calorie foods from your home and workplace  Place a bowl of fresh fruit on your kitchen counter  If stress causes you to eat, then find other ways to cope with stress  · Keep a diary to track what you eat and drink  Also write down how many minutes of physical activity you do each day  Weigh yourself once a week and record it in your diary  Eating changes: You will need to eat 500 to 1,000 fewer calories each day than you currently eat to lose 1 to 2 pounds a week  The following changes will help you cut calories:  · Eat smaller portions  Use small plates, no larger than 9 inches in diameter  Fill your plate half full of fruits and vegetables  Measure your food using measuring cups until you know what a serving size looks like  · Eat 3 meals and 1 or 2 snacks each day  Plan your meals in advance  Ezra Alegre and eat at home most of the time  Eat slowly  · Eat fruits and vegetables at every meal   They are low in calories and high in fiber, which makes you feel full  Do not add butter, margarine, or cream sauce to vegetables  Use herbs to season steamed vegetables  · Eat less fat and fewer fried foods  Eat more baked or grilled chicken and fish  These protein sources are lower in calories and fat than red meat  Limit fast food   Dress your salads with olive oil and vinegar instead of bottled dressing  · Limit the amount of sugar you eat  Do not drink sugary beverages  Limit alcohol  Activity changes:  Physical activity is good for your body in many ways  It helps you burn calories and build strong muscles  It decreases stress and depression, and improves your mood  It can also help you sleep better  Talk to your healthcare provider before you begin an exercise program   · Exercise for at least 30 minutes 5 days a week  Start slowly  Set aside time each day for physical activity that you enjoy and that is convenient for you  It is best to do both weight training and an activity that increases your heart rate, such as walking, bicycling, or swimming  · Find ways to be more active  Do yard work and housecleaning  Walk up the stairs instead of using elevators  Spend your leisure time going to events that require walking, such as outdoor festivals or fairs  This extra physical activity can help you lose weight and keep it off  Follow up with your healthcare provider as directed: You may need to meet with a dietitian  Write down your questions so you remember to ask them during your visits  © 2017 [employer] Information is for End User's use only and may not be sold, redistributed or otherwise used for commercial purposes  All illustrations and images included in CareNotes® are the copyrighted property of [employer]  or [employer]  The above information is an  only  It is not intended as medical advice for individual conditions or treatments  Talk to your doctor, nurse or pharmacist before following any medical regimen to see if it is safe and effective for you  Weight Management   AMBULATORY CARE:   Why it is important to manage your weight:  Being overweight increases your risk of health conditions such as heart disease, high blood pressure, type 2 diabetes, and certain types of cancer   It can also increase your risk for osteoarthritis, sleep apnea, and other respiratory problems  Aim for a slow, steady weight loss  Even a small amount of weight loss can lower your risk of health problems  How to lose weight safely:  A safe and healthy way to lose weight is to eat fewer calories and get regular exercise  You can lose up about 1 pound a week by decreasing the number of calories you eat by 500 calories each day  You can decrease calories by eating smaller portion sizes or by cutting out high-calorie foods  Read labels to find out how many calories are in the foods you eat  You can also burn calories with exercise such as walking, swimming, or biking  You will be more likely to keep weight off if you make these changes part of your lifestyle  Healthy meal plan for weight management:  A healthy meal plan includes a variety of foods, contains fewer calories, and helps you stay healthy  A healthy meal plan includes the following:  · Eat whole-grain foods more often  A healthy meal plan should contain fiber  Fiber is the part of grains, fruits, and vegetables that is not broken down by your body  Whole-grain foods are healthy and provide extra fiber in your diet  Some examples of whole-grain foods are whole-wheat breads and pastas, oatmeal, brown rice, and bulgur  · Eat a variety of vegetables every day  Include dark, leafy greens such as spinach, kale, roberto greens, and mustard greens  Eat yellow and orange vegetables such as carrots, sweet potatoes, and winter squash  · Eat a variety of fruits every day  Choose fresh or canned fruit (canned in its own juice or light syrup) instead of juice  Fruit juice has very little or no fiber  · Eat low-fat dairy foods  Drink fat-free (skim) milk or 1% milk  Eat fat-free yogurt and low-fat cottage cheese  Try low-fat cheeses such as mozzarella and other reduced-fat cheeses  · Choose meat and other protein foods that are low in fat    Choose beans or other legumes such as split peas or lentils  Choose fish, skinless poultry (chicken or turkey), or lean cuts of red meat (beef or pork)  Before you cook meat or poultry, cut off any visible fat  · Use less fat and oil  Try baking foods instead of frying them  Add less fat, such as margarine, sour cream, regular salad dressing and mayonnaise to foods  Eat fewer high-fat foods  Some examples of high-fat foods include french fries, doughnuts, ice cream, and cakes  · Eat fewer sweets  Limit foods and drinks that are high in sugar  This includes candy, cookies, regular soda, and sweetened drinks  Ways to decrease calories:   · Eat smaller portions  ¨ Use a small plate with smaller servings  ¨ Do not eat second helpings  ¨ When you eat at a restaurant, ask for a box and place half of your meal in the box before you eat  ¨ Share an entrée with someone else  · Replace high-calorie snacks with healthy, low-calorie snacks  ¨ Choose fresh fruit, vegetables, fat-free rice cakes, or air-popped popcorn instead of potato chips, nuts, or chocolate  ¨ Choose water or calorie-free drinks instead of soda or sweetened drinks  · Eat regular meals  Skipping meals can lead to overeating later in the day  Eat a healthy snack in place of a meal if you do not have time to eat a regular meal      · Do not shop for groceries when you are hungry  You may be more likely to make unhealthy food choices  Take a grocery list of healthy foods and shop after you have eaten  Exercise:  Exercise at least 30 minutes per day on most days of the week  Some examples of exercise include walking, biking, dancing, and swimming  You can also fit in more physical activity by taking the stairs instead of the elevator or parking farther away from stores  Ask your healthcare provider about the best exercise plan for you     Other things to consider as you try to lose weight:   · Be aware of situations that may give you the urge to overeat, such as eating while watching television  Find ways to avoid these situations  For example, read a book, go for a walk, or do crafts  · Meet with a weight loss support group or friends who are also trying to lose weight  This may help you stay motivated to continue working on your weight loss goals  © 2017 [employer] Information is for End User's use only and may not be sold, redistributed or otherwise used for commercial purposes  All illustrations and images included in CareNotes® are the copyrighted property of [employer]  or [employer]  The above information is an  only  It is not intended as medical advice for individual conditions or treatments  Talk to your doctor, nurse or pharmacist before following any medical regimen to see if it is safe and effective for you

## 2019-08-31 ENCOUNTER — APPOINTMENT (OUTPATIENT)
Dept: LAB | Facility: MEDICAL CENTER | Age: 54
End: 2019-08-31
Payer: COMMERCIAL

## 2019-08-31 DIAGNOSIS — M17.0 PRIMARY OSTEOARTHRITIS OF BOTH KNEES: ICD-10-CM

## 2019-08-31 DIAGNOSIS — I10 ESSENTIAL HYPERTENSION: ICD-10-CM

## 2019-08-31 DIAGNOSIS — L60.3 DYSTROPHIC NAIL: ICD-10-CM

## 2019-08-31 DIAGNOSIS — M06.00 SERONEGATIVE EROSIVE RHEUMATOID ARTHRITIS (HCC): ICD-10-CM

## 2019-08-31 DIAGNOSIS — Z11.59 ENCOUNTER FOR HEPATITIS C SCREENING TEST FOR LOW RISK PATIENT: ICD-10-CM

## 2019-08-31 LAB
25(OH)D3 SERPL-MCNC: 13.8 NG/ML (ref 30–100)
ALBUMIN SERPL BCP-MCNC: 3.2 G/DL (ref 3.5–5)
ALP SERPL-CCNC: 70 U/L (ref 46–116)
ALT SERPL W P-5'-P-CCNC: 24 U/L (ref 12–78)
ANION GAP SERPL CALCULATED.3IONS-SCNC: 5 MMOL/L (ref 4–13)
AST SERPL W P-5'-P-CCNC: 11 U/L (ref 5–45)
BASOPHILS # BLD AUTO: 0.09 THOUSANDS/ΜL (ref 0–0.1)
BASOPHILS NFR BLD AUTO: 1 % (ref 0–1)
BILIRUB SERPL-MCNC: 0.36 MG/DL (ref 0.2–1)
BUN SERPL-MCNC: 20 MG/DL (ref 5–25)
CALCIUM SERPL-MCNC: 9.1 MG/DL (ref 8.3–10.1)
CHLORIDE SERPL-SCNC: 102 MMOL/L (ref 100–108)
CO2 SERPL-SCNC: 31 MMOL/L (ref 21–32)
CREAT SERPL-MCNC: 0.84 MG/DL (ref 0.6–1.3)
CRP SERPL QL: 14.3 MG/L
EOSINOPHIL # BLD AUTO: 0.15 THOUSAND/ΜL (ref 0–0.61)
EOSINOPHIL NFR BLD AUTO: 2 % (ref 0–6)
ERYTHROCYTE [DISTWIDTH] IN BLOOD BY AUTOMATED COUNT: 14.5 % (ref 11.6–15.1)
ERYTHROCYTE [SEDIMENTATION RATE] IN BLOOD: 25 MM/HOUR (ref 0–20)
GFR SERPL CREATININE-BSD FRML MDRD: 79 ML/MIN/1.73SQ M
GLUCOSE P FAST SERPL-MCNC: 68 MG/DL (ref 65–99)
HCT VFR BLD AUTO: 41.5 % (ref 34.8–46.1)
HGB BLD-MCNC: 12.9 G/DL (ref 11.5–15.4)
IMM GRANULOCYTES # BLD AUTO: 0.04 THOUSAND/UL (ref 0–0.2)
IMM GRANULOCYTES NFR BLD AUTO: 0 % (ref 0–2)
LYMPHOCYTES # BLD AUTO: 3.5 THOUSANDS/ΜL (ref 0.6–4.47)
LYMPHOCYTES NFR BLD AUTO: 37 % (ref 14–44)
MCH RBC QN AUTO: 28.4 PG (ref 26.8–34.3)
MCHC RBC AUTO-ENTMCNC: 31.1 G/DL (ref 31.4–37.4)
MCV RBC AUTO: 91 FL (ref 82–98)
MONOCYTES # BLD AUTO: 0.74 THOUSAND/ΜL (ref 0.17–1.22)
MONOCYTES NFR BLD AUTO: 8 % (ref 4–12)
NEUTROPHILS # BLD AUTO: 4.95 THOUSANDS/ΜL (ref 1.85–7.62)
NEUTS SEG NFR BLD AUTO: 52 % (ref 43–75)
NRBC BLD AUTO-RTO: 0 /100 WBCS
PLATELET # BLD AUTO: 429 THOUSANDS/UL (ref 149–390)
PMV BLD AUTO: 9.4 FL (ref 8.9–12.7)
POTASSIUM SERPL-SCNC: 3.6 MMOL/L (ref 3.5–5.3)
PROT SERPL-MCNC: 7 G/DL (ref 6.4–8.2)
RBC # BLD AUTO: 4.55 MILLION/UL (ref 3.81–5.12)
SODIUM SERPL-SCNC: 138 MMOL/L (ref 136–145)
TSH SERPL DL<=0.05 MIU/L-ACNC: 2.34 UIU/ML (ref 0.36–3.74)
WBC # BLD AUTO: 9.47 THOUSAND/UL (ref 4.31–10.16)

## 2019-08-31 PROCEDURE — 85025 COMPLETE CBC W/AUTO DIFF WBC: CPT

## 2019-08-31 PROCEDURE — 85652 RBC SED RATE AUTOMATED: CPT

## 2019-08-31 PROCEDURE — 86140 C-REACTIVE PROTEIN: CPT

## 2019-08-31 PROCEDURE — 84443 ASSAY THYROID STIM HORMONE: CPT

## 2019-08-31 PROCEDURE — 80053 COMPREHEN METABOLIC PANEL: CPT

## 2019-08-31 PROCEDURE — 86430 RHEUMATOID FACTOR TEST QUAL: CPT

## 2019-08-31 PROCEDURE — 86038 ANTINUCLEAR ANTIBODIES: CPT

## 2019-08-31 PROCEDURE — 86803 HEPATITIS C AB TEST: CPT

## 2019-08-31 PROCEDURE — 36415 COLL VENOUS BLD VENIPUNCTURE: CPT

## 2019-08-31 PROCEDURE — 82306 VITAMIN D 25 HYDROXY: CPT

## 2019-09-01 LAB — HCV AB SER QL: NORMAL

## 2019-09-02 LAB — RYE IGE QN: NEGATIVE

## 2019-09-03 ENCOUNTER — TELEPHONE (OUTPATIENT)
Dept: FAMILY MEDICINE CLINIC | Facility: CLINIC | Age: 54
End: 2019-09-03

## 2019-09-03 DIAGNOSIS — J45.30 MILD PERSISTENT ASTHMA WITHOUT COMPLICATION: ICD-10-CM

## 2019-09-03 LAB — RHEUMATOID FACT SER QL LA: NEGATIVE

## 2019-09-03 RX ORDER — ALBUTEROL SULFATE 2.5 MG/3ML
2.5 SOLUTION RESPIRATORY (INHALATION) EVERY 6 HOURS PRN
Qty: 150 ML | Refills: 3 | Status: SHIPPED | OUTPATIENT
Start: 2019-09-03 | End: 2020-03-06 | Stop reason: SDUPTHER

## 2019-09-03 NOTE — TELEPHONE ENCOUNTER
Patient saw that her results were in on my chart and was wondering if MF looked them over yet  I told her that she does not come in until 2 today and she should be getting a call later on today    Thank you!

## 2019-09-04 DIAGNOSIS — M06.00 SERONEGATIVE EROSIVE RHEUMATOID ARTHRITIS (HCC): Primary | ICD-10-CM

## 2019-09-14 DIAGNOSIS — J45.30 MILD PERSISTENT ASTHMA WITHOUT COMPLICATION: ICD-10-CM

## 2019-09-15 RX ORDER — ALBUTEROL SULFATE 90 UG/1
2 AEROSOL, METERED RESPIRATORY (INHALATION) 4 TIMES DAILY
Qty: 8.5 INHALER | Refills: 5 | Status: SHIPPED | OUTPATIENT
Start: 2019-09-15 | End: 2020-03-13 | Stop reason: SDUPTHER

## 2019-09-16 ENCOUNTER — CONSULT (OUTPATIENT)
Dept: RHEUMATOLOGY | Facility: CLINIC | Age: 54
End: 2019-09-16
Payer: COMMERCIAL

## 2019-09-16 ENCOUNTER — HOSPITAL ENCOUNTER (OUTPATIENT)
Dept: BONE DENSITY | Facility: MEDICAL CENTER | Age: 54
Discharge: HOME | End: 2019-09-16
Payer: COMMERCIAL

## 2019-09-16 ENCOUNTER — APPOINTMENT (OUTPATIENT)
Dept: LAB | Facility: MEDICAL CENTER | Age: 54
End: 2019-09-16
Payer: COMMERCIAL

## 2019-09-16 ENCOUNTER — APPOINTMENT (OUTPATIENT)
Dept: RADIOLOGY | Facility: MEDICAL CENTER | Age: 54
End: 2019-09-16
Payer: COMMERCIAL

## 2019-09-16 VITALS
DIASTOLIC BLOOD PRESSURE: 76 MMHG | HEIGHT: 61 IN | BODY MASS INDEX: 38.71 KG/M2 | WEIGHT: 205 LBS | SYSTOLIC BLOOD PRESSURE: 120 MMHG

## 2019-09-16 DIAGNOSIS — E55.9 VITAMIN D DEFICIENCY: ICD-10-CM

## 2019-09-16 DIAGNOSIS — M06.00 SERONEGATIVE EROSIVE RHEUMATOID ARTHRITIS (HCC): ICD-10-CM

## 2019-09-16 DIAGNOSIS — Z79.899 HIGH RISK MEDICATION USE: ICD-10-CM

## 2019-09-16 DIAGNOSIS — Z79.52 CURRENT CHRONIC USE OF SYSTEMIC STEROIDS: ICD-10-CM

## 2019-09-16 DIAGNOSIS — M06.00 SERONEGATIVE EROSIVE RHEUMATOID ARTHRITIS (HCC): Primary | ICD-10-CM

## 2019-09-16 DIAGNOSIS — Z79.899 ENCOUNTER FOR LONG-TERM (CURRENT) USE OF OTHER MEDICATIONS: Primary | ICD-10-CM

## 2019-09-16 DIAGNOSIS — M17.0 PRIMARY OSTEOARTHRITIS OF BOTH KNEES: ICD-10-CM

## 2019-09-16 LAB
HAV IGM SER QL: NORMAL
HBV CORE AB SER QL: NORMAL
HBV CORE IGM SER QL: NORMAL
HBV CORE IGM SER QL: NORMAL
HBV SURFACE AG SER QL: NORMAL
HBV SURFACE AG SER QL: NORMAL
HCV AB SER QL: NORMAL
HCV AB SER QL: NORMAL

## 2019-09-16 PROCEDURE — 86704 HEP B CORE ANTIBODY TOTAL: CPT | Performed by: INTERNAL MEDICINE

## 2019-09-16 PROCEDURE — 73130 X-RAY EXAM OF HAND: CPT

## 2019-09-16 PROCEDURE — 86147 CARDIOLIPIN ANTIBODY EA IG: CPT | Performed by: INTERNAL MEDICINE

## 2019-09-16 PROCEDURE — 86480 TB TEST CELL IMMUN MEASURE: CPT | Performed by: INTERNAL MEDICINE

## 2019-09-16 PROCEDURE — 77080 DXA BONE DENSITY AXIAL: CPT

## 2019-09-16 PROCEDURE — 85732 THROMBOPLASTIN TIME PARTIAL: CPT | Performed by: INTERNAL MEDICINE

## 2019-09-16 PROCEDURE — 86146 BETA-2 GLYCOPROTEIN ANTIBODY: CPT | Performed by: INTERNAL MEDICINE

## 2019-09-16 PROCEDURE — 36415 COLL VENOUS BLD VENIPUNCTURE: CPT | Performed by: INTERNAL MEDICINE

## 2019-09-16 PROCEDURE — 73630 X-RAY EXAM OF FOOT: CPT

## 2019-09-16 PROCEDURE — 85670 THROMBIN TIME PLASMA: CPT | Performed by: INTERNAL MEDICINE

## 2019-09-16 PROCEDURE — 85705 THROMBOPLASTIN INHIBITION: CPT | Performed by: INTERNAL MEDICINE

## 2019-09-16 PROCEDURE — 85613 RUSSELL VIPER VENOM DILUTED: CPT | Performed by: INTERNAL MEDICINE

## 2019-09-16 PROCEDURE — 99245 OFF/OP CONSLTJ NEW/EST HI 55: CPT | Performed by: INTERNAL MEDICINE

## 2019-09-16 PROCEDURE — 80074 ACUTE HEPATITIS PANEL: CPT

## 2019-09-16 RX ORDER — PREDNISONE 10 MG/1
10 TABLET ORAL DAILY
Qty: 30 TABLET | Refills: 1 | Status: SHIPPED | OUTPATIENT
Start: 2019-09-16 | End: 2019-10-24 | Stop reason: SDUPTHER

## 2019-09-16 RX ORDER — ERGOCALCIFEROL 1.25 MG/1
50000 CAPSULE ORAL WEEKLY
Qty: 12 CAPSULE | Refills: 0 | Status: SHIPPED | OUTPATIENT
Start: 2019-09-16 | End: 2019-12-07 | Stop reason: SDUPTHER

## 2019-09-16 NOTE — PATIENT INSTRUCTIONS
Do bloodwork today  Do hand and feet x-rays today  Schedule DEXA scan  Continue prednisone 10mg daily  Use diclofenac gel on knees and shoulders as needed  Take vitamin d pill once a week  Will try to get humira approved, every other week injection    Return to clinic in 1 month    Rheumatoid Arthritis   WHAT YOU NEED TO KNOW:   Rheumatoid arthritis (RA) is a long-term autoimmune disease that causes inflammation and damage to your joints  RA causes your body's immune system to attack the synovial membrane (lining) in your joints  RA can also affect other organs, such as your eyes, heart, or lungs  It may also increase your risk of osteoporosis (weakened bones)  DISCHARGE INSTRUCTIONS:   Return to the emergency department if:   · You have increased joint swelling, pain, or redness  · You have sudden shortness of breath  · You lose feeling in your hands or feet  Contact your healthcare provider or rheumatologist if:   · You have a fever  · Your skin is itchy, swollen, or has a rash  · Your symptoms are getting worse, even with treatment  · You have questions or concerns about your condition or care  Medicines:   · Antirheumatics  help slow the progress of RA, and reduce pain, stiffness, and inflammation  · NSAIDs , such as ibuprofen, help decrease swelling, pain, and fever  NSAIDs can cause stomach bleeding or kidney problems in certain people  If you take blood thinner medicine, always ask your healthcare provider if NSAIDs are safe for you  Always read the medicine label and follow directions  · Biologic therapy  helps your immune system fight the disease  These medicines increase the risk of serious infection and require careful monitoring  · Steroids: This medicine may be given to decrease inflammation  · Take your medicine as directed  Contact your healthcare provider if you think your medicine is not helping or if you have side effects   Tell him of her if you are allergic to any medicine  Keep a list of the medicines, vitamins, and herbs you take  Include the amounts, and when and why you take them  Bring the list or the pill bottles to follow-up visits  Carry your medicine list with you in case of an emergency  Follow up with your healthcare provider or rheumatologist as directed:  Write down your questions so you remember to ask them during your visits  Manage your symptoms:   · Go to physical and occupational therapy as directed  A physical therapist can teach you exercises to help improve movement and strength, and to decrease pain  An occupational therapist can teach you skills to help with your daily activities  · Use support devices  You may be given splints to wear on your hands to help your joints rest and to decrease inflammation  While you sleep, use a pillow that is firm enough to support your neck and head  · Rest when needed  Rest is important if your joints are painful  Limit your activities until your symptoms improve  Gradually start your normal activities when you can do them without pain  Avoid motions and activities that cause strain on your joints, such as heavy exercise and lifting  · Use ice or heat  Both can help decrease swelling and pain  Ice may also help prevent tissue damage  Use an ice pack, or put crushed ice in a plastic bag  Cover it with a towel and place it on your joint for 15 to 20 minutes every hour or as directed  You can apply heat for 20 minutes every 2 hours  Heat treatment includes hot packs, heat lamps, warm baths, or showers  · Physical activity  can help increase strength and flexibility  Be as active as possible while avoiding things that increase your pain  Ask your healthcare provider or rheumatologist about the best exercise plan for you  Use special equipment:  The following devices may help you move and prevent falls:  · Orthotic shoes or insoles  can support your feet when you walk      · Crutches, cane, or a walker  may help decrease your chance of falling or being hurt  They also decrease stress on affected joints  Ask for more information about how to choose and use crutches, a cane, or a walker  · Devices to prevent falls  include raised toilet seats and bathtub bars to help you get up from sitting  Handrails can be placed in areas where you need balance and support  Self-care:   · Eat a variety of healthy foods  Healthy foods include fruits, vegetables, whole-grain breads, low-fat dairy products, beans, lean meats, and fish  Ask if you need to be on a special diet  A diet rich in calcium and vitamin D may decrease your risk of osteoporosis  Foods high in calcium include milk, cheese, broccoli, and tofu  Vitamin D may be found in meat, fish, fortified milk, cereal and bread  Ask if you need calcium or vitamin D supplements  · Maintain a healthy weight  This may help decrease strain on joints in your back, knees, ankles, and feet  Ask your healthcare provider how much you should weigh  Ask him to help you create a weight loss plan if you are overweight  Maintain a healthy weight to help decrease strain on the joints in your back, knees, ankles, and feet  · Do not smoke  Nicotine and other chemicals in cigarettes and cigars can cause lung damage  Ask your healthcare provider for information if you currently smoke and need help to quit  E-cigarettes or smokeless tobacco still contain nicotine  Talk to your healthcare provider before you use these products  Ask your healthcare provider about vaccines: You may be at an increased risk for infections due to RA and its treatment  Vaccines may prevent infections from various viruses  © 2017 [employer] Information is for End User's use only and may not be sold, redistributed or otherwise used for commercial purposes   All illustrations and images included in CareNotes® are the copyrighted property of [employer]  or [employer]  The above information is an  only  It is not intended as medical advice for individual conditions or treatments  Talk to your doctor, nurse or pharmacist before following any medical regimen to see if it is safe and effective for you

## 2019-09-16 NOTE — PROGRESS NOTES
Assessment and Plan: Fazal Gillespie is a 46 y o   female who presents as a Rheumatology consult referred by her PCP Blessing Colin MD for evaluation and management of her seronegative Rheumatoid arthritis  Patient has a DAS28 score of 3 32, consistent with moderate disease activity  She is having active inflammatory arthritis despite being on prednisone 10mg po daily, which she cannot stay on long-term without being tapered  Patient has already been tried on methotrexate and sulfasalazine DMARD therapy, both of which have caused patient to develop a rash  Thus at this time, it would be appropriate to pursue treatment with a biologic medication such as Humira to better control her disease  Patient had a recent low Vit  D level, so prescribed ergocalciferol 50,000 units po weekly  Also ordered a DEXA scan to rule-out out osteoporosis given patient's history of long-term steroid use, however this returned normal  Ordered a viral hepatitis panel and TB quantiferon in preparation for starting patient on Humira every other week SC injections  Will continue prednisone 10mg po daily for now, with plan to slowly taper off  Prescribed diclofenac gel to apply to painful knees and shoulders as needed for both her RA and OA  Ordered bilateral hand and feet x-rays to obtain her current baseline  Since patient has a history of miscarriage, ordered an antiphospholipid panel, which returned unremarkable  Plan:  Diagnoses and all orders for this visit:    Seronegative erosive rheumatoid arthritis ([facility] [address] )  -     Ambulatory referral to Rheumatology  -     Lupus anticoagulant  -     Cardiolipin antibody  -     Beta-2 glycoprotein antibodies  -     XR foot 3+ vw left; Future  -     XR foot 3+ vw right; Future  -     XR hand 3+ vw left; Future  -     XR hand 3+ vw right; Future  -     diclofenac sodium (VOLTAREN) 1 %; Apply 4 g topically 4 (four) times a day as needed (pain)  -     ergocalciferol (VITAMIN D2) 50,000 units;  Take 1 capsule (50,000 Units total) by mouth once a week  -     predniSONE 10 mg tablet; Take 1 tablet (10 mg total) by mouth daily  -     DXA bone density spine hip and pelvis; Future    High risk medication use - Benefits and risks of TNF inhibitors discussed, and included but are not limited to leukopenia, reactivation of hepatitis B or Tb, lymphoma, infusion or site reactions, exacerbation of heart failure, and increased risk of respiratory infections  A baseline viral hepatitis panel and TB quantiferon were ordered  CBC, CMP will be monitored regularly  -     Chronic Hepatitis Panel  -     Acute Hepatitis Panel (Refl)  -     Quantiferon TB Gold Plus    Current chronic use of systemic steroids  -     ergocalciferol (VITAMIN D2) 50,000 units; Take 1 capsule (50,000 Units total) by mouth once a week  -     predniSONE 10 mg tablet; Take 1 tablet (10 mg total) by mouth daily  -     DXA bone density spine hip and pelvis; Future    Primary osteoarthritis of both knees  - diclofenac sodium (VOLTAREN) 1 %; Apply 4 g topically 4 (four) times a day as needed (pain)    Vitamin D deficiency  - ergocalciferol (VITAMIN D2) 50,000 units; Take 1 capsule (50,000 Units total) by mouth once a week    Follow-up plan: Return to clinic in 1 month      Chief Complaint  Chief Complaint   Patient presents with    Joint Pain     B/L Knees    Joint Swelling     B/L Knees    Fatigue    Weakness - Generalized    Numbness     Fingers     HPI  Eugene Rose is a 46 y o   female who presents as a Rheumatology consult referred by her PCP Micky Allred MD for evaluation and management of her seronegative Rheumatoid arthritis  Reviewed outside records from patient's previous rheumatologist Dr Yolanda Turner at [facility], last clinic visit being 3/22/18  At this visit, he stated that the patient continued to demonstrate both degenerative and inflammatory changes of shoulders and knees   She also had a bone scan that showed uptake at the shoulders at the SC joint areas  At the time, patient was on prednisone with Tylenol Arthritis  Her RF, anti-CCP, and CORNEL in 12/2017 were negative, but ESR was 93 and CRP was 7 times the upper limit of normal  She had not developed any inflammatory arthritis of small joints such as hands, wrists, ankles, or feet  Patient admits to being on prednisone for a few years now, currently taking prednisone 10mg po daily, but it is not fully controlling her pain  Patient had a rash on both methotrexate and sulfasalazine in the past  She also has tried multiple NSAIDs including diclofenac, meloxicam, and oxaprozin  She admits to two hours of morning stiffness in her knees and shoulders  Sometimes her fingers bother her, but not her feet  She admits to getting easy bruising from being on prednisone so long  She feels ready to try a biologic medication to better manage her RA  She has a history of in vitro fertilization, and one miscarriage in the past  She admits to smoking 1-2 cigarettes a day  She has been told that she has a fatty liver  She has a family history of Rheumatoid arthritis in mother, father, and sister  Review of Systems  Review of Systems   Constitutional: Negative for chills, fatigue, fever and unexpected weight change  HENT: Negative for mouth sores and trouble swallowing  Eyes: Negative for pain and visual disturbance  Respiratory: Negative for cough and shortness of breath  Cardiovascular: Negative for chest pain and leg swelling  Gastrointestinal: Negative for abdominal pain, blood in stool, constipation, diarrhea and nausea  Genitourinary: Negative for hematuria  Musculoskeletal: Positive for arthralgias and joint swelling  Negative for back pain and myalgias  Skin: Negative for rash  Neurological: Negative for weakness and numbness  Hematological: Negative for adenopathy  Psychiatric/Behavioral: Negative for sleep disturbance         Allergies  Allergies   Allergen Reactions    Sulfasalazine Rash     rash    Other Rash     Adhesive tape and adhesive bandaids    Wound Dressing Adhesive Other (See Comments)     rash, itchy       Home Medications    Current Outpatient Medications:     acetaminophen (TYLENOL) 650 mg CR tablet, Take 650 mg by mouth every 12 (twelve) hours, Disp: , Rfl:     albuterol (2 5 mg/3 mL) 0 083 % nebulizer solution, TAKE 1 VIAL (2 5 MG TOTAL) BY NEBULIZATION EVERY 6 (SIX) HOURS AS NEEDED FOR WHEEZING, Disp: 150 mL, Rfl: 3    albuterol (PROVENTIL HFA,VENTOLIN HFA) 90 mcg/act inhaler, INHALE 2 PUFFS 4 (FOUR) TIMES A DAY AS DIRECTED, Disp: 8 5 Inhaler, Rfl: 5    budesonide-formoterol (SYMBICORT) 160-4 5 mcg/act inhaler, Inhale 2 puffs 2 (two) times a day, Disp: , Rfl:     buPROPion (WELLBUTRIN SR) 150 mg 12 hr tablet, 2 am, 1 pm, Disp: 90 tablet, Rfl: 5    clotrimazole-betamethasone (LOTRISONE) 1-0 05 % cream, Apply topically 2 (two) times a day, Disp: 30 g, Rfl: 0    cyclobenzaprine (FLEXERIL) 10 mg tablet, Take 1 tablet (10 mg total) by mouth 3 (three) times a day as needed for muscle spasms, Disp: 30 tablet, Rfl: 1    fluticasone (FLONASE) 50 mcg/act nasal spray, 1 spray into each nostril as needed  , Disp: , Rfl:     levalbuterol (XOPENEX HFA) 45 mcg/act inhaler, Inhale 1-2 puffs every 4 (four) hours as needed for wheezing, Disp: 1 Inhaler, Rfl: 5    losartan-hydrochlorothiazide (HYZAAR) 100-25 MG per tablet, Take 1 tablet by mouth daily, Disp: 30 tablet, Rfl: 5    montelukast (SINGULAIR) 10 mg tablet, Take 1 tablet (10 mg total) by mouth daily at bedtime, Disp: 90 tablet, Rfl: 1    spironolactone (ALDACTONE) 25 mg tablet, Take 25 mg by mouth as needed, Disp: , Rfl:     diclofenac sodium (VOLTAREN) 1 %, Apply 4 g topically 4 (four) times a day as needed (pain), Disp: 300 g, Rfl: 6    ergocalciferol (VITAMIN D2) 50,000 units, Take 1 capsule (50,000 Units total) by mouth once a week, Disp: 12 capsule, Rfl: 0    LORazepam (ATIVAN) 1 mg tablet, Take 1 tablet (1 mg total) by mouth once for 1 dose Take 1 mg tablet one hour prior to MRI study  , Disp: 1 tablet, Rfl: 0    predniSONE 10 mg tablet, Take 1 tablet (10 mg total) by mouth daily, Disp: 30 tablet, Rfl: 1    SPIRIVA RESPIMAT 1 25 MCG/ACT AERS inhaler, INHALE 2 INHALATIONS BY MOUTH DAILY, Disp: 3 Inhaler, Rfl: 1    Past Medical History  Past Medical History:   Diagnosis Date    Abdominal hernia     Anxiety     Arthritis     o a  left knee    Asthma     Brain injury ([facility] [address] )     Short term memory problems; confusion; has affected depth perception," injury at work"    COPD (chronic obstructive pulmonary disease) ([facility] [address] )     CPAP (continuous positive airway pressure) dependence     Depression     Fatty liver     GERD (gastroesophageal reflux disease)     History of gestational diabetes     Was on insulin    History of laparoscopic adjustable gastric banding     History of recent fall     3/2018    Hypertension     Knee pain, right     Obese     Pneumonia     x1    RA (rheumatoid arthritis) ([facility] [address] )     Risk for falls     Shortness of breath     "sometimes with asthma"    Shoulder pain, bilateral     pain started after a fall in     Sleep apnea     Vomiting        Past Surgical History   Past Surgical History:   Procedure Laterality Date    ANAL FISSURECTOMY       SECTION      x1    ELBOW SURGERY Left     FOOT SURGERY Bilateral     5th toes    LAPAROSCOPIC GASTRIC BANDING  2010    Dr Alicia Orlando, [facility] EGD TRANSORAL BIOPSY SINGLE/MULTIPLE N/A 1/3/2018    Procedure: ESOPHAGOGASTRODUODENOSCOPY (EGD) with biopsy;  Surgeon: Nahum Vázquez MD;  Location: [facility];   Service: Bariatrics       Family History    Family History   Problem Relation Age of Onset    Diabetes type II Mother     Hypertension Mother     Arthritis Mother     Congenital heart disease Mother     Stroke Mother     Diabetes type II Father     Hypertension Father     Asthma Father    Michelle Sudden death Other 28        niece    Asthma Sister     Asthma Brother     Seizures Son     Asthma Maternal Aunt    Rheumatoid arthritis - mother, father, and sister    Social History  Occupation: is on disability; used to work in pharmaceuticals  Social History     Substance and Sexual Activity   Alcohol Use Yes    Comment: social     Social History     Substance and Sexual Activity   Drug Use No     Social History     Tobacco Use   Smoking Status Current Some Day Smoker    Packs/day: 0 15    Years: 3 00    Pack years: 0 45    Types: Cigarettes   Smokeless Tobacco Never Used   Tobacco Comment    pt reports has cut back   admits to smoking 1-2 cigarettes a day    Objective:  Vitals:    09/16/19 0813   BP: 120/76   BP Location: Left arm   Patient Position: Sitting   Cuff Size: Large   Weight: 93 kg (205 lb)   Height: 5' 1" (1 549 m)       Physical Exam   Constitutional: She appears well-developed and well-nourished  No distress  HENT:   Head: Normocephalic and atraumatic  Mouth/Throat: Oropharynx is clear and moist and mucous membranes are normal    Eyes: Conjunctivae and EOM are normal  No scleral icterus  Neck: Neck supple  No spinous process tenderness and no muscular tenderness present  No thyromegaly present  Cardiovascular: Normal rate, regular rhythm, S1 normal and S2 normal  Exam reveals no friction rub  No murmur heard  Pulmonary/Chest: Effort normal and breath sounds normal  No respiratory distress  She has no wheezes  She has no rhonchi  She has no rales  Abdominal: Soft  She exhibits no distension  There is no hepatosplenomegaly  There is no tenderness  Musculoskeletal: She exhibits tenderness  Right worse than left shoulder crepitus, bilateral knee crepitus   Lymphadenopathy:     She has no cervical adenopathy  Neurological: She is alert  She has normal strength  No sensory deficit  Skin: Skin is warm and dry  No rash noted  Nails show no clubbing     Psychiatric: She has a normal mood and affect  Musculoskeletal--Peripheral Joint Exam:  Physical Exam     Swelling:   Left hand: 2nd MCP and 3rd MCP    TALAMANTES-28 tender joint count: 0  TALAMANTES-28 swollen joint count: 2  CRP (mg/L): 14 3  Patient global assessment: 70  TALAMANTES-28 CRP score: 3 32 (Moderate Disease Activity)      Reviewed labs and imaging  Imaging:   DEXA Scan 9/16/19  RESULTS:   LUMBAR SPINE:  L1-L3,L4:  BMD 1 019 gm/cm2  T-score normal, -0 3  Z-score 0 7     LEFT TOTAL HIP:  BMD 1 076 gm/cm2  T-score above normal, +1 1  Z-score 1 8     LEFT FEMORAL NECK:  BMD 0 765 gm/cm2  T-score normal, -0 8  Z-score 0 3     IMPRESSION:  1  Based on the [facility] classification, this study is normal and the patient is considered at low risk for fracture        Labs:   Appointment on 09/16/2019   Component Date Value Ref Range Status    Hepatitis B Surface Ag 09/16/2019 Non-reactive  Non-reactive, NonReactive - Confirmed Final    Hep A IgM 09/16/2019 Non-reactive  Non-reactive, Equivocal-Suggest Recollect Final    Hepatitis C Ab 09/16/2019 Non-reactive  Non-reactive Final    Hep B C IgM 09/16/2019 Non-reactive  Non-reactive Final   Consult on 09/16/2019   Component Date Value Ref Range Status    Hepatitis B Surface Ag 09/16/2019 Non-reactive  Non-reactive, NonReactive - Confirmed Final    Hepatitis C Ab 09/16/2019 Non-reactive  Non-reactive Final    Hep B C IgM 09/16/2019 Non-reactive  Non-reactive Final    Hep B Core Total Ab 09/16/2019 Non-reactive  Non-reactive Final    QFT Nil 09/16/2019 0 04  0 - 8 0 IU/ml Final    QFT TB1-NIL 09/16/2019 0 00  IU/ml Final    QFT TB2-NIL 09/16/2019 0 00  IU/ml Final    QFT Mitogen-NIL 09/16/2019 0 22  IU/ml Final    QFT Final Interpretation 09/16/2019 Indeterminate* Negative Final    Indeterminate results due to low Interferon-gamma in the mitogen minus nil result (<0 50 IU/ml) may occur due to low lymphocyte count, reduced lymphocyte activity, or inablility of the patient's lymphocytes to generate interferon-gamma  Indeterminate results due to a high level of Interferon-gamma in the Nil tube (>8 00 IU/ml) may occur due to a heterophile antibody or nonspecific Interferon-gamma in the patient's blood sample  Indeterminate results may occur due to incorrect collection, transport or handling of the blood specimen  Based on the given collection time and lab receipt time, this specimen meets acceptable criteria for testing  Repeat testing on a new specimen is suggested   PTT Lupus Anticoagulant 09/16/2019 40 8  0 0 - 51 9 sec Final    Dilute Viper Venom Time 09/16/2019 39 6  0 0 - 47 0 sec Final    DILUTE PROTHROMBIN TIME(DPT) 09/16/2019 40 9  0 0 - 55 0 sec Final    THROMBIN TIME (DRVW) 09/16/2019 17 2  0 0 - 23 0 sec Final    DPT CONFIRM RATIO 09/16/2019 1 22  0 00 - 1 40 Ratio Final    LUPUS REFLEX INTERPRETATION 09/16/2019 Comment:   Final    No lupus anticoagulant was detected      Anticardiolipin IgA 09/16/2019 <9  0 - 11 APL U/mL Final                              Negative:              <12                            Indeterminate:     12 - 20                            Low-Med Positive: >20 - 80                            High Positive:         >80    Anticardiolipin IgG 09/16/2019 <9  0 - 14 GPL U/mL Final                              Negative:              <15                            Indeterminate:     15 - 20                            Low-Med Positive: >20 - 80                            High Positive:         >80    Anticardiolipin IgM 09/16/2019 <9  0 - 12 MPL U/mL Final                              Negative:              <13                            Indeterminate:     13 - 20                            Low-Med Positive: >20 - 80                            High Positive:         >80    Beta-2 Glyco 1 IgG 09/16/2019 <9  0 - 20 GPI IgG units Final    The reference interval reflects a 3SD or 99th percentile interval,  which is thought to represent a potentially clinically significant  result in accordance with the International Consensus Statement on  the classification criteria for definitive antiphospholipid syndrome  (APS)  J Thromb Haem 2006;4:295-306   Beta-2 Glyco 1 IgA 09/16/2019 <9  0 - 25 GPI IgA units Final    The reference interval reflects a 3SD or 99th percentile interval,  which is thought to represent a potentially clinically significant  result in accordance with the International Consensus Statement on  the classification criteria for definitive antiphospholipid syndrome  (APS)  J Thromb Haem 2006;4:295-306   Beta-2 Glyco 1 IgM 09/16/2019 <9  0 - 32 GPI IgM units Final    The reference interval reflects a 3SD or 99th percentile interval,  which is thought to represent a potentially clinically significant  result in accordance with the International Consensus Statement on  the classification criteria for definitive antiphospholipid syndrome  (APS)  J Thromb Haem 2006;4:295-306     Appointment on 08/31/2019   Component Date Value Ref Range Status    WBC 08/31/2019 9 47  4 31 - 10 16 Thousand/uL Final    RBC 08/31/2019 4 55  3 81 - 5 12 Million/uL Final    Hemoglobin 08/31/2019 12 9  11 5 - 15 4 g/dL Final    Hematocrit 08/31/2019 41 5  34 8 - 46 1 % Final    MCV 08/31/2019 91  82 - 98 fL Final    MCH 08/31/2019 28 4  26 8 - 34 3 pg Final    MCHC 08/31/2019 31 1* 31 4 - 37 4 g/dL Final    RDW 08/31/2019 14 5  11 6 - 15 1 % Final    MPV 08/31/2019 9 4  8 9 - 12 7 fL Final    Platelets 53/50/2824 429* 149 - 390 Thousands/uL Final    nRBC 08/31/2019 0  /100 WBCs Final    Neutrophils Relative 08/31/2019 52  43 - 75 % Final    Immat GRANS % 08/31/2019 0  0 - 2 % Final    Lymphocytes Relative 08/31/2019 37  14 - 44 % Final    Monocytes Relative 08/31/2019 8  4 - 12 % Final    Eosinophils Relative 08/31/2019 2  0 - 6 % Final    Basophils Relative 08/31/2019 1  0 - 1 % Final    Neutrophils Absolute 08/31/2019 4 95  1 85 - 7 62 Thousands/µL Final    Immature Grans Absolute 08/31/2019 0 04  0 00 - 0 20 Thousand/uL Final    Lymphocytes Absolute 08/31/2019 3 50  0 60 - 4 47 Thousands/µL Final    Monocytes Absolute 08/31/2019 0 74  0 17 - 1 22 Thousand/µL Final    Eosinophils Absolute 08/31/2019 0 15  0 00 - 0 61 Thousand/µL Final    Basophils Absolute 08/31/2019 0 09  0 00 - 0 10 Thousands/µL Final    Sodium 08/31/2019 138  136 - 145 mmol/L Final    Potassium 08/31/2019 3 6  3 5 - 5 3 mmol/L Final    Chloride 08/31/2019 102  100 - 108 mmol/L Final    CO2 08/31/2019 31  21 - 32 mmol/L Final    ANION GAP 08/31/2019 5  4 - 13 mmol/L Final    BUN 08/31/2019 20  5 - 25 mg/dL Final    Creatinine 08/31/2019 0 84  0 60 - 1 30 mg/dL Final    Standardized to IDMS reference method    Glucose, Fasting 08/31/2019 68  65 - 99 mg/dL Final      Specimen collection should occur prior to Sulfasalazine administration due to the potential for falsely depressed results  Specimen collection should occur prior to Sulfapyridine administration due to the potential for falsely elevated results   Calcium 08/31/2019 9 1  8 3 - 10 1 mg/dL Final    AST 08/31/2019 11  5 - 45 U/L Final      Specimen collection should occur prior to Sulfasalazine administration due to the potential for falsely depressed results   ALT 08/31/2019 24  12 - 78 U/L Final      Specimen collection should occur prior to Sulfasalazine and/or Sulfapyridine administration due to the potential for falsely depressed results       Alkaline Phosphatase 08/31/2019 70  46 - 116 U/L Final    Total Protein 08/31/2019 7 0  6 4 - 8 2 g/dL Final    Albumin 08/31/2019 3 2* 3 5 - 5 0 g/dL Final    Total Bilirubin 08/31/2019 0 36  0 20 - 1 00 mg/dL Final    eGFR 08/31/2019 79  ml/min/1 73sq m Final    Sed Rate 08/31/2019 25* 0 - 20 mm/hour Final    CRP 08/31/2019 14 3* <3 0 mg/L Final    CORNEL 08/31/2019 Negative  Negative Final    Rheumatoid Factor 08/31/2019 Negative  Negative Final    Vit D, 25-Hydroxy 08/31/2019 13 8* 30 0 - 100 0 ng/mL Final    TSH 3RD GENERATON 08/31/2019 2 340  0 358 - 3 740 uIU/mL Final      The recommended reference ranges for TSH during pregnancy are as follows:   First trimester 0 1 to 2 5 uIU/mL   Second trimester  0 2 to 3 0 uIU/mL   Third trimester 0 3 to 3 0 uIU/m    Note: Normal ranges may not apply to patients who are transgender, non-binary, or whose legal sex, sex at birth, and gender identity differ  Using supplements with high doses of biotin 20 to more than 300 times greater than the adequate daily intake for adults of 30 mcg/day as established by the [place] [employer] [facility], can cause falsely depress results      Hepatitis C Ab 08/31/2019 Non-reactive  Non-reactive Final

## 2019-09-17 DIAGNOSIS — J45.30 MILD PERSISTENT ASTHMA WITHOUT COMPLICATION: ICD-10-CM

## 2019-09-17 LAB
CARDIOLIPIN IGA SER IA-ACNC: <9 APL U/ML (ref 0–11)
CARDIOLIPIN IGG SER IA-ACNC: <9 GPL U/ML (ref 0–14)
CARDIOLIPIN IGM SER IA-ACNC: <9 MPL U/ML (ref 0–12)

## 2019-09-17 RX ORDER — TIOTROPIUM BROMIDE INHALATION SPRAY 1.56 UG/1
SPRAY, METERED RESPIRATORY (INHALATION)
Qty: 3 INHALER | Refills: 1 | Status: SHIPPED | OUTPATIENT
Start: 2019-09-17 | End: 2022-03-14 | Stop reason: ALTCHOICE

## 2019-09-18 ENCOUNTER — TELEPHONE (OUTPATIENT)
Dept: FAMILY MEDICINE CLINIC | Facility: CLINIC | Age: 54
End: 2019-09-18

## 2019-09-18 DIAGNOSIS — G47.30 SLEEP APNEA, UNSPECIFIED TYPE: Primary | ICD-10-CM

## 2019-09-18 PROBLEM — E55.9 VITAMIN D DEFICIENCY: Status: ACTIVE | Noted: 2019-09-18

## 2019-09-18 LAB
APTT SCREEN TO CONFIRM RATIO: 1.22 RATIO (ref 0–1.4)
B2 GLYCOPROT1 IGA SER-ACNC: <9 GPI IGA UNITS (ref 0–25)
B2 GLYCOPROT1 IGG SER-ACNC: <9 GPI IGG UNITS (ref 0–20)
B2 GLYCOPROT1 IGM SER-ACNC: <9 GPI IGM UNITS (ref 0–32)
CONFIRM APTT/NORMAL: 40.9 SEC (ref 0–55)
GAMMA INTERFERON BACKGROUND BLD IA-ACNC: 0.04 IU/ML
LA PPP-IMP: NORMAL
M TB IFN-G BLD-IMP: ABNORMAL
M TB IFN-G CD4+ BCKGRND COR BLD-ACNC: 0 IU/ML
M TB IFN-G CD4+ BCKGRND COR BLD-ACNC: 0 IU/ML
MITOGEN IGNF BCKGRD COR BLD-ACNC: 0.22 IU/ML
SCREEN APTT: 40.8 SEC (ref 0–51.9)
SCREEN DRVVT: 39.6 SEC (ref 0–47)
THROMBIN TIME: 17.2 SEC (ref 0–23)

## 2019-09-18 NOTE — TELEPHONE ENCOUNTER
[facility] Pre-Encounter called  Pt's insurance will not cover home sleep study  But will cover in lab sleep studies  Can Dr Michael Pérez order an in lab sleep study for the Pt? Please advise and have someone call Pt

## 2019-09-19 ENCOUNTER — TELEPHONE (OUTPATIENT)
Dept: FAMILY MEDICINE CLINIC | Facility: CLINIC | Age: 54
End: 2019-09-19

## 2019-09-19 ENCOUNTER — TELEPHONE (OUTPATIENT)
Dept: RHEUMATOLOGY | Facility: CLINIC | Age: 54
End: 2019-09-19

## 2019-09-19 ENCOUNTER — TELEPHONE (OUTPATIENT)
Dept: SLEEP CENTER | Facility: CLINIC | Age: 54
End: 2019-09-19

## 2019-09-19 ENCOUNTER — APPOINTMENT (OUTPATIENT)
Dept: RADIOLOGY | Facility: MEDICAL CENTER | Age: 54
End: 2019-09-19
Payer: COMMERCIAL

## 2019-09-19 ENCOUNTER — TELEPHONE (OUTPATIENT)
Dept: OBGYN CLINIC | Facility: HOSPITAL | Age: 54
End: 2019-09-19

## 2019-09-19 DIAGNOSIS — G47.30 SLEEP APNEA, UNSPECIFIED TYPE: Primary | ICD-10-CM

## 2019-09-19 DIAGNOSIS — Z79.899 HIGH RISK MEDICATION USE: ICD-10-CM

## 2019-09-19 DIAGNOSIS — M06.00 SERONEGATIVE EROSIVE RHEUMATOID ARTHRITIS (HCC): ICD-10-CM

## 2019-09-19 PROCEDURE — 71046 X-RAY EXAM CHEST 2 VIEWS: CPT

## 2019-09-19 NOTE — TELEPHONE ENCOUNTER
Julio Esquivel  [phone]    Dr Moncho Engel    Patient said she received call about test results, Please return her call

## 2019-09-19 NOTE — TELEPHONE ENCOUNTER
DR Hortensia Blanchard,  PT IS UPSET ABOUT HER HOME SLEEP STUDY  SHE HAS BEEN GOING AROUND WITH THE FACILITY AND THE INSURANCE ABOUT PAYMENT  PT THINKS HER INSUR WILL COVER THE STUDY AS LONG AS WE OBTAIN AN AUTH  I EXPLAINED TO HER HOW HER INSUR WORKS WITH SLEEP STUDIES AND SHE IS NOW ON BOARD  [employer] ONLY COVERS FACILITY STUDIES  PLEASE ENTER A SCRIPT FOR A SPLIT SLEEP STUDY (23682) DX: MEAGHAN ([id number]), HYPERSOMNIA ([id number] [phone])  SINCE PT IS  HYPERTENSIVE AND IS A CURRENT C-PAP USER THIS SHOULD BE COVERED UNDER HER INSUR  I EXPLAINED TO THE PT THAT SHE WILL NEED TO FOLLOW UP WITH THE SLEEP SPECIALIST TO GET SCRIPTS FROM THEM FOR HER C-PAP SUPPLIES , SUCH AS MASK AND HOSES  SHE IS OK WITH THAT  WHEN THE SCRIPT IS ENTERED INTO Epic PLEASE CALL PT AND LET HER KNOW IT IS READY SO THAT SHE MAY CALL AND MAKE AN APPT FOR THE TEST  BEFORE SHE GOES FOR THE TEST SHE WILL HAVE TO FIND SOMEONE TO STAY WITH HER SON, SHE CAN NOT LEAVE HIM ALONE    PT CAN BE REACHED -[phone]

## 2019-09-19 NOTE — TELEPHONE ENCOUNTER
----- Message from Barbara Glasgow DO sent at 9/18/2019  4:13 PM EDT -----  Chart reviewed  Study approved  Schedule HST   ----- Message -----  From: Mendy Oden MA  Sent: 9/16/2019   8:31 AM EDT  To: Sleep Medicine [place] Provider    This sleep study needs approval      If approved please sign and return to clerical pool  If denied please include reasons why  Also provide alternative testing if warranted  Please sign and return to clerical pool

## 2019-10-01 ENCOUNTER — OFFICE VISIT (OUTPATIENT)
Dept: FAMILY MEDICINE CLINIC | Facility: CLINIC | Age: 54
End: 2019-10-01
Payer: COMMERCIAL

## 2019-10-01 VITALS
WEIGHT: 207.2 LBS | TEMPERATURE: 97.9 F | HEIGHT: 61 IN | SYSTOLIC BLOOD PRESSURE: 118 MMHG | BODY MASS INDEX: 39.12 KG/M2 | DIASTOLIC BLOOD PRESSURE: 72 MMHG

## 2019-10-01 DIAGNOSIS — J01.90 ACUTE NON-RECURRENT SINUSITIS, UNSPECIFIED LOCATION: ICD-10-CM

## 2019-10-01 DIAGNOSIS — J02.9 SORE THROAT: Primary | ICD-10-CM

## 2019-10-01 DIAGNOSIS — I10 ESSENTIAL HYPERTENSION: ICD-10-CM

## 2019-10-01 LAB — S PYO AG THROAT QL: NEGATIVE

## 2019-10-01 PROCEDURE — 99213 OFFICE O/P EST LOW 20 MIN: CPT | Performed by: FAMILY MEDICINE

## 2019-10-01 PROCEDURE — 87880 STREP A ASSAY W/OPTIC: CPT | Performed by: FAMILY MEDICINE

## 2019-10-01 RX ORDER — AZITHROMYCIN 500 MG/1
500 TABLET, FILM COATED ORAL DAILY
Qty: 3 TABLET | Refills: 0 | Status: SHIPPED | OUTPATIENT
Start: 2019-10-01 | End: 2019-10-04

## 2019-10-01 NOTE — PROGRESS NOTES
Assessment and Plan:  Follow up if not better  Problem List Items Addressed This Visit        Respiratory    Acute non-recurrent sinusitis     The patient was placed on Zithromax 500 mg 1 daily for 3 days and Coricidin HBP/cold and flu 1 tablet twice a day and 2 at bedtime and Delsym 2 tsp twice a day for her cough  Relevant Medications    azithromycin (ZITHROMAX) 500 MG tablet       Cardiovascular and Mediastinum    Hypertension     Her blood pressure is stable at 118/72 on high Hyzaar 100-251 tablet daily  Other    Sore throat - Primary     Her rapid strep test done in the office was negative  Relevant Medications    azithromycin (ZITHROMAX) 500 MG tablet    Other Relevant Orders    POCT rapid strepA (Completed)                 Diagnoses and all orders for this visit:    Sore throat  -     POCT rapid strepA  -     azithromycin (ZITHROMAX) 500 MG tablet; Take 1 tablet (500 mg total) by mouth daily for 3 days    Acute non-recurrent sinusitis, unspecified location  -     azithromycin (ZITHROMAX) 500 MG tablet; Take 1 tablet (500 mg total) by mouth daily for 3 days    Essential hypertension              Subjective:      Patient ID: Muna Douglas is a 46 y o  female  CC:    Chief Complaint   Patient presents with    Cough     Pt c/o sore throat, head congestion and cough  SHe has been using her inhaler more frequently  kw    Headache    Sore Throat       HPI:    This is a 76-year-old female who comes in for symptoms of a sore throat, headache and a cough which keeps her awake at night  The cough is productive  She does have albuterol inhaler which she is using 4 times a day and also has albuterol for nebulizer at home  She denies any nausea, vomiting or diarrhea  A strep test was done in the office which was negative  Her blood pressure is 118/72 and her temperature is 97 9°  Her BMI is 39 1 and her weight is up 2 lb from the previous visit to 207 lb        The following portions of the patient's history were reviewed and updated as appropriate: allergies, current medications, past family history, past medical history, past social history, past surgical history and problem list       Review of Systems   Constitutional: Negative  Negative for chills, fatigue and fever  HENT: Positive for congestion, postnasal drip, rhinorrhea, sinus pressure and sore throat  Negative for ear pain  Eyes: Negative  Respiratory: Positive for cough  Negative for chest tightness, shortness of breath and wheezing  Cardiovascular: Negative  Negative for chest pain  Gastrointestinal: Negative  Negative for diarrhea, nausea and vomiting  Endocrine: Negative  Genitourinary: Negative  Musculoskeletal: Negative  Skin: Negative  Allergic/Immunologic: Negative  Neurological: Negative  Hematological: Negative  Psychiatric/Behavioral: Negative  Data to review:       Objective:    Vitals:    10/01/19 1103   BP: 118/72   BP Location: Left arm   Patient Position: Sitting   Temp: 97 9 °F (36 6 °C)   TempSrc: Tympanic   Weight: 94 kg (207 lb 3 2 oz)   Height: 5' 1" (1 549 m)        Physical Exam   Constitutional: She is oriented to person, place, and time  She appears well-developed and well-nourished  HENT:   Head: Normocephalic  Right Ear: Tympanic membrane and external ear normal    Left Ear: Tympanic membrane and external ear normal    Mouth/Throat: Oropharynx is clear and moist  No oropharyngeal exudate or posterior oropharyngeal erythema  Positive postnasal drip, +1 erythema of posterior pharynx without exudates   Eyes: Pupils are equal, round, and reactive to light  Conjunctivae and EOM are normal    Neck: Normal range of motion  Neck supple  Cardiovascular: Normal rate, regular rhythm and normal heart sounds  Pulmonary/Chest: Effort normal and breath sounds normal    Abdominal: Soft  Bowel sounds are normal    Musculoskeletal: Normal range of motion  Lymphadenopathy:     She has no cervical adenopathy  Neurological: She is alert and oriented to person, place, and time  Skin: Skin is warm  Psychiatric: She has a normal mood and affect  Her behavior is normal  Judgment and thought content normal    Nursing note and vitals reviewed  Body mass index is 39 15 kg/m²

## 2019-10-01 NOTE — ASSESSMENT & PLAN NOTE
The patient was placed on Zithromax 500 mg 1 daily for 3 days and Coricidin HBP/cold and flu 1 tablet twice a day and 2 at bedtime and Delsym 2 tsp twice a day for her cough

## 2019-10-04 ENCOUNTER — TELEPHONE (OUTPATIENT)
Dept: FAMILY MEDICINE CLINIC | Facility: CLINIC | Age: 54
End: 2019-10-04

## 2019-10-04 NOTE — TELEPHONE ENCOUNTER
The patient needs to be patient because she has only been on medication for a few days and the Zithromax lasts for 10 days to 2 weeks  These things do not clear up in several days  I explained this to her in the room when I gave her the medication

## 2019-10-04 NOTE — TELEPHONE ENCOUNTER
PATIENT SAID THAT SHE IS NOT FEELING ANY BETTER  SHE STILL HAS A LOT OF CONGESTION EVEN WITH USING HER NEBULIZER  SHE IS WONDERING IF SOMETHING ELSE COULD BE CALLED INTO [facility]     SHE WOULD LIKE A CALL BACK TOO  THANK YOU!

## 2019-10-08 ENCOUNTER — TELEPHONE (OUTPATIENT)
Dept: SLEEP CENTER | Facility: CLINIC | Age: 54
End: 2019-10-08

## 2019-10-08 NOTE — TELEPHONE ENCOUNTER
----- Message from Bal Muhammad DO sent at 10/2/2019  4:26 PM EDT -----  Chart reviewed   Study approved   ----- Message -----  From: Lopez Zaidi  Sent: 9/27/2019  11:17 AM EDT  To: Sleep Medicine [facility]    Study for approval

## 2019-10-10 ENCOUNTER — TELEPHONE (OUTPATIENT)
Dept: RHEUMATOLOGY | Facility: CLINIC | Age: 54
End: 2019-10-10

## 2019-10-10 NOTE — TELEPHONE ENCOUNTER
Dr Fernandes   #: [phone]           Patient is calling in requesting a call back  Patient would like to know what is the status on humira? Also would like to know when is the dr going to lean her off the prednisone?    Please advise, thank you

## 2019-10-10 NOTE — TELEPHONE ENCOUNTER
Please let patient know that we're working on the Humira approval; I should know the status by the time the patient comes for her appointment with me this Monday  I want her to stay on the 10mg of prednisone a day until I see her; my plan is to wean her down by 1mg per month after I see her      Thanks,  HM

## 2019-10-11 ENCOUNTER — OFFICE VISIT (OUTPATIENT)
Dept: SLEEP CENTER | Facility: CLINIC | Age: 54
End: 2019-10-11
Payer: COMMERCIAL

## 2019-10-11 ENCOUNTER — HOSPITAL ENCOUNTER (OUTPATIENT)
Dept: SLEEP CENTER | Facility: CLINIC | Age: 54
Discharge: HOME | End: 2019-10-11
Payer: COMMERCIAL

## 2019-10-11 VITALS
BODY MASS INDEX: 38.71 KG/M2 | DIASTOLIC BLOOD PRESSURE: 76 MMHG | HEART RATE: 94 BPM | SYSTOLIC BLOOD PRESSURE: 120 MMHG | WEIGHT: 205 LBS | HEIGHT: 61 IN

## 2019-10-11 DIAGNOSIS — G47.30 SLEEP APNEA, UNSPECIFIED TYPE: ICD-10-CM

## 2019-10-11 DIAGNOSIS — E66.9 OBESITY (BMI 30-39.9): ICD-10-CM

## 2019-10-11 DIAGNOSIS — J45.30 MILD PERSISTENT ASTHMA WITHOUT COMPLICATION: ICD-10-CM

## 2019-10-11 DIAGNOSIS — I10 ESSENTIAL HYPERTENSION: ICD-10-CM

## 2019-10-11 DIAGNOSIS — F32.A ANXIETY AND DEPRESSION: ICD-10-CM

## 2019-10-11 DIAGNOSIS — G47.09 OTHER INSOMNIA: ICD-10-CM

## 2019-10-11 DIAGNOSIS — R53.83 FATIGUE, UNSPECIFIED TYPE: ICD-10-CM

## 2019-10-11 DIAGNOSIS — Z91.09 ENVIRONMENTAL ALLERGIES: ICD-10-CM

## 2019-10-11 DIAGNOSIS — G47.33 OBSTRUCTIVE SLEEP APNEA SYNDROME: Primary | ICD-10-CM

## 2019-10-11 DIAGNOSIS — F41.9 ANXIETY AND DEPRESSION: ICD-10-CM

## 2019-10-11 DIAGNOSIS — F17.200 CURRENT SMOKER: ICD-10-CM

## 2019-10-11 DIAGNOSIS — D68.9 COAGULOPATHY (HCC): ICD-10-CM

## 2019-10-11 PROCEDURE — 99244 OFF/OP CNSLTJ NEW/EST MOD 40: CPT | Performed by: INTERNAL MEDICINE

## 2019-10-11 PROCEDURE — 95811 POLYSOM 6/>YRS CPAP 4/> PARM: CPT

## 2019-10-11 NOTE — PROGRESS NOTES
Consultation - [address] [place] [address] Drive  46 y o  female  :1965  UBU:[id number]    Physician Requesting Consult: Osmani Gibson MD     Reason for Consult : At your kind request I saw this patient for initial sleep evaluation today  She was diagnosed with obstructive sleep apnea in [place] is several years ago and prescribed CPAP  She discontinued use 2 years ago when her machine broke  She is here because of symptom recurrence  She states she had Home sleep testing around  and was treated with auto titrating Pap  She could not recall severity or the pressure setting  She is unable to obtain results of her prior studies  PFSH, Problem List, Medications & Allergies were reviewed in EMR  She  has a past medical history of Abdominal hernia, Anxiety, Arthritis, Asthma, Brain injury ([facility] [address] ) (), COPD (chronic obstructive pulmonary disease) ([facility] [address] ), CPAP (continuous positive airway pressure) dependence, Depression, Fatty liver, GERD (gastroesophageal reflux disease), History of gestational diabetes, History of laparoscopic adjustable gastric banding, History of recent fall, Hypertension, Knee pain, right, Obese, Pneumonia, RA (rheumatoid arthritis) ([facility] [address] ), Risk for falls, Shortness of breath, Shoulder pain, bilateral, Sleep apnea, and Vomiting  She has a current medication list which includes the following prescription(s): acetaminophen, albuterol, albuterol, budesonide-formoterol, bupropion, clotrimazole-betamethasone, cyclobenzaprine, diclofenac sodium, ergocalciferol, fluticasone, levalbuterol, losartan-hydrochlorothiazide, montelukast, prednisone, spiriva respimat, and spironolactone  HPI:  She continues to report loud snoring that disturbs others in the home  At times she awakens herself with snoring, choking or gagging  Symptoms are worse in association with allergies  She felt she slept a lot better when she was using CPAP  Other Complaints: none   Restless Leg Syndrome: reports no suggestive symptoms    Parasomnia: no features reported    Sleep Routine:   Typical Bedtime:  8:30 p m  Gets OOB:  4:00 a m  TIB:7 5 hrs  Sleep latency:<  45 minutes because of racing thoughts  Sleep Interruptions:8/nite and struggles to fall back asleep  Awakens: spontaneously  Upon awakening: never feels rested  She estimates getting 3 hrs sleep  She has constant fatigue and Daytime Sleepiness but is not dozing or falling asleep inadvertently  Moro Sleepiness Scale rated at Total score: 0 /24  Habits: reports that she has been smoking cigarettes  She has a 0 45 pack-year smoking history  She uses smokeless tobacco  , reports that she drank alcohol  ,  reports that she does not use drugs  ,Caffeine use:limited , Exercise routine: none   Family History:  Sister has obstructive sleep apnea  ROS: reviewed & as attached  Significant for intentional weight loss of around 50 lb in the past 1 year  She has allergies/ asthma and had a recent excessive a shin  She has rheumatoid arthritis for which she is on prednisone and is being weaned off this medication  Presently she is not reporting nasal, respiratory or cardiac symptoms  She has ongoing feelings of anxiety and depression in spite of current medication  EXAM:  /76   Pulse 94   Ht 5' 1" (1 549 m)   Wt 93 kg (205 lb)   BMI 38 73 kg/m²    General: Well groomed female, well appearing, in no apparent distress  Psychiatric: Alert and orientated; Cooperative; Speech:clear; appears depressed and has a constricted affect  HEENT:  Craniofacial anatomy: normal Sinuses: non- tender   TMJ: Normal    Eyes: EOM's intact;  conjunctiva/corneas clear   Ears: Externallyappear normal     Nasal Airway: is patent Septum:intact; Mucosa: normal; Turbinates: normal; Rhinorrhea: None   Mouth: Lips: normal posture; Dentition: normal   Mucosa:moist Tongue: mobile, ; Hard Palate:normal    Oropharryx: Mallampati class:III (soft and hard palate and base of uvula visible) Soft Palate:  redundant  Tonsils: no hypertrophy  Neck: Neck Circumference: 15 "; Supple; no abnormal masses; Thyroid:normal  Trachea:central     Lymph: No Cervical or Submandibular Lymhadenopathy  Heart: S1,S2 normal; RRR; no gallop; nomurmurs   Lungs: Respiratory Effort:normal  Air entry good bilaterally  No wheezes  No rales  Abdomen: Obese, Soft & non-tender    Extremities: No pedal edema  No clubbing or cyanosis  Skin: warm and dry; Color& Hydration good; no facial rashes or lesions   Neurological: CNII-XII intact; Motor normal; Sensation normal   Musculoskeletal: Muscle bulk, tone and power WNL Gait:normal        IMPRESSION: Primary, Secondary Sleep Diagnoses (to Medical or Psych conditions) & Comorbidities   1  Obstructive sleep apnea syndrome  Ambulatory referral to Sleep Medicine   2  Other insomnia     3  Fatigue, unspecified type     4  Mild persistent asthma without complication     5  Environmental allergies     6  Current smoker     7  Anxiety and depression     8  Essential hypertension     9  Obesity (BMI 30-39 9)     10  Coagulopathy ([facility] [address] )          PLAN:  1  Results of her prior studies are no longer available  In any event, she needs re-evaluation given her significant weight reduction and asthma  2  With respect to above conditions, I counseled on pathophysiology, diagnosis, treatment options, risks and benefits; inter-relationship and effects on symptoms and comorbidities; risks of no treatment; costs and insurance aspects  3  Cognitive behavioral therapy was initiated with advise on Sleep Hygiene and behavioral techniques to manage Insomnia  Specifically keeping a regular routine, limiting time in bed to 7-1/2 hours, avoiding naps, regular exercise and relaxation techniques  I also advised smoking cessation and she is trying to stop    4  Since she wants to re-initiate therapy, a split study to be undertaken and treatment can be initiated for AHI of greater than 5 per hour   5  Follow-up is advised after the study to review results and to initiate therapy            Sincerely,     Authenticated electronically by Ryan Porter MD   on 51/[birth year]/47   Board Certified Specialist

## 2019-10-11 NOTE — TELEPHONE ENCOUNTER
Hernan Nation,    This is the other patient I wanted you to start working on the Humira prior auth for  It's urgent, I need a status by Monday, since I will be seeing her in clinic  She has seronegative Rheumatoid arthritis and has active inflammatory arthritis  She has already been tried on methotrexate and sulfasalazine, both of which caused a rash  Viral hepatitis panel is negative: TB quantiferon was indeterminate so I sent her for a chest x-ray, which came back normal  So we can proceed with the prior auth on Senderra  I want her on the 40mg per 0 4mL citrate-free formula subcutaneous every two weeks (14 days)  Her Humira Complete Ambassador form is already scanned into her chart      Thanks,  AMANDA

## 2019-10-11 NOTE — TELEPHONE ENCOUNTER
Rios,  For quickest response from [email or web address], please print the form out from online and fax clinicals and that form to number listed on the top of the form

## 2019-10-11 NOTE — PATIENT INSTRUCTIONS
What is MEAGHAN? Obstructive sleep apnea is a common and serious sleep disorder that causes you to stop breathing during sleep  The airway repeatedly becomes blocked, limiting the amount of air that reaches your lungs  When this happens, you may snore loudly or making choking noises as you try to breathe  Your brain and body becomes oxygen deprived and you may wake up  This may happen a few times a night, or in more severe cases, several hundred times a night  Sleep apnea can make you wake up in the morning feeling tired or unrefreshed even though you have had a full night of sleep  During the day, you may feel fatigued, have difficulty concentrating or you may even unintentionally fall asleep  This is because your body is waking up numerous times throughout the night, even though you might not be conscious of each awakening  The lack of oxygen your body receives can have negative long-term consequences for your health  This includes:  High blood pressure  Heart disease  Irregular heart rhythms  Stroke  Pre-diabetes and diabetes  Depression    Testing  An objective evaluation of your sleep may be needed before your board certified sleep physician can make a diagnosis  Options include:   In-lab overnight sleep study  This type of sleep study requires you to stay overnight at a sleep center, in a bed that may resemble a hotel room  You will sleep with sensors hooked up to various parts of your body  These sensors record your brain waves, heartbeat, breathing and movement  An overnight sleep study also provides your doctor with the most complete information about your sleep  Learn more about an overnight sleep study      Home sleep apnea test  Some patients with high risk factors for obstructive sleep apnea and no other medical disorders may be candidates for a home sleep apnea test  The testing equipment differs in that it is less complicated than what is used in an overnight sleep study   As such, does not give all the data an in-lab will and if negative, may not mean you do not have the problem  Treatment for sleep apnea  includes using a continuous positive airway pressure (CPAP) machine to keep your airway open during sleep  A mask is placed over your nose and mouth, or just your nose  The mask is hooked to the CPAP machine that blows a gentle stream of air into the mask when you breathe  This helps keep your airway open so you can breathe more regularly  Extra oxygen may be given to you through the machine  You may be given a mouth device  It looks like a mouth guard or dental retainer and stops your tongue and mouth tissues from blocking your throat while you sleep  Surgery may be needed to remove extra tissues that block your mouth, throat, or nose  Manage sleep apnea:   Do not smoke  Nicotine and other chemicals in cigarettes and cigars can cause lung damage  Ask your healthcare provider for information if you currently smoke and need help to quit  E-cigarettes or smokeless tobacco still contain nicotine  Talk to your healthcare provider before you use these products  Do not drink alcohol or take sedative medicine before you go to sleep  Alcohol and sedatives can relax the muscles and tissues around your throat  This can block the airflow to your lungs  Maintain a healthy weight  Excess tissue around your throat may restrict your breathing  Ask your healthcare provider for information if you need to lose weight  Sleep on your side or use pillows designed to prevent sleep apnea  This prevents your tongue or other tissues from blocking your throat  You can also raise the head of your bed  Driving Safety  Refrain from driving when drowsy  Follow up with your healthcare provider as directed:  Write down your questions so you remember to ask them during your visits  Go to AASM website for more information: Sleepeducation  org     What is MEAGHAN?    Obstructive sleep apnea is a common and serious sleep disorder that causes you to stop breathing during sleep  The airway repeatedly becomes blocked, limiting the amount of air that reaches your lungs  When this happens, you may snore loudly or making choking noises as you try to breathe  Your brain and body becomes oxygen deprived and you may wake up  This may happen a few times a night, or in more severe cases, several hundred times a night  Sleep apnea can make you wake up in the morning feeling tired or unrefreshed even though you have had a full night of sleep  During the day, you may feel fatigued, have difficulty concentrating or you may even unintentionally fall asleep  This is because your body is waking up numerous times throughout the night, even though you might not be conscious of each awakening  The lack of oxygen your body receives can have negative long-term consequences for your health  This includes:  High blood pressure  Heart disease  Irregular heart rhythms  Stroke  Pre-diabetes and diabetes  Depression    Testing  An objective evaluation of your sleep may be needed before your board certified sleep physician can make a diagnosis  Options include:   In-lab overnight sleep study  This type of sleep study requires you to stay overnight at a sleep center, in a bed that may resemble a hotel room  You will sleep with sensors hooked up to various parts of your body  These sensors record your brain waves, heartbeat, breathing and movement  An overnight sleep study also provides your doctor with the most complete information about your sleep  Learn more about an overnight sleep study      Home sleep apnea test  Some patients with high risk factors for obstructive sleep apnea and no other medical disorders may be candidates for a home sleep apnea test  The testing equipment differs in that it is less complicated than what is used in an overnight sleep study   As such, does not give all the data an in-lab will and if negative, may not mean you do not have the problem  Treatment for sleep apnea  includes using a continuous positive airway pressure (CPAP) machine to keep your airway open during sleep  A mask is placed over your nose and mouth, or just your nose  The mask is hooked to the CPAP machine that blows a gentle stream of air into the mask when you breathe  This helps keep your airway open so you can breathe more regularly  Extra oxygen may be given to you through the machine  You may be given a mouth device  It looks like a mouth guard or dental retainer and stops your tongue and mouth tissues from blocking your throat while you sleep  Surgery may be needed to remove extra tissues that block your mouth, throat, or nose  Manage sleep apnea:   Do not smoke  Nicotine and other chemicals in cigarettes and cigars can cause lung damage  Ask your healthcare provider for information if you currently smoke and need help to quit  E-cigarettes or smokeless tobacco still contain nicotine  Talk to your healthcare provider before you use these products  Do not drink alcohol or take sedative medicine before you go to sleep  Alcohol and sedatives can relax the muscles and tissues around your throat  This can block the airflow to your lungs  Maintain a healthy weight  Excess tissue around your throat may restrict your breathing  Ask your healthcare provider for information if you need to lose weight  Sleep on your side or use pillows designed to prevent sleep apnea  This prevents your tongue or other tissues from blocking your throat  You can also raise the head of your bed  Driving Safety  Refrain from driving when drowsy  Follow up with your healthcare provider as directed:  Write down your questions so you remember to ask them during your visits  Go to AASM website for more information: Sleepeducation  org           What you can do to improve your sleep: (Sleep Hygiene) Basic rules for a good night's sleep    Create a regular sleep schedule    This will help you form a sleep routine  Keep a record of your sleep patterns, and any sleeping problems you have  Bring the record to follow-up visits with healthcare providers  Avoid prolonged use of light-emitting screens before bedtime or watching TV in bed  Avoid forcing sleep  Do not take naps  Naps could make it hard for you to fall asleep at bedtime  Deal with your worries before bedtime  Keep your bedroom cool, quiet, and dark  Turn on white noise, such as a fan, to help you relax  Do not use your bed for any activity that will keep you awake  Do not read, exercise, eat, or watch TV in your bedroom  Get up if you do not fall asleep within 20 minutes  Move to another room and do something relaxing until you become sleepy  Limit caffeine, alcohol, nicotine and food to earlier in the day  Only drink caffeine in the morning  Do not drink alcohol within 6 hours of bedtime  Do not eat a heavy meal right before you go to bed  Avoid smoking, especially in the evening  Exercise regularly  Daily exercise will help you sleep better  Do not exercise within 4 hours of bedtime  Stimulus control therapy rules  1  Go to bed only when sleepy  2  Do not watch television, read, eat, or worry while in bed  Use bed only for sleep and sex  3  Get out of bed if unable to fall asleep within 20 minutes and go to another room  Return to bed only when sleepy  Repeat this step as many times as necessary throughout the night  4  Set an alarm clock to wake up at a fixed time each morning, including weekends  5  Do not take a nap during the day  Data from: [address] [place] [address], [address] [employer] Nonpharmacologic treatments of insomnia  J Clin Psychiatry 6669; 53:37  Go to AASM website for more information: Sleepeducation  org     Recommended Reading:  Book by authors [address]   No More sleepless nights    Nursing Support:  When: Monday through Friday 7A-5PM except holidays  Where: Our direct line is [phone]  If you are having a true emergency please call 911  In the event that the line is busy or it is after hours please leave a voice message and we will return your call  Please speak clearly, leaving your full name, birth date, best number to reach you and the reason for your call  Medication refills: We will need the name of the medication, the dosage, the ordering provider, whether you get a 30 or 90 day refill, and the pharmacy name and address  Medications will be ordered by the provider only  Nurses cannot call in prescriptions  Please allow 7 days for medication refills  Physician requested updates: If your provider requested that you call with an update after starting medication, please be ready to provide us the medication and dosage, what time you take your medication, the time you attempt to fall asleep, time you fall asleep, when you wake up, and what time you get out of bed  Sleep Study Results: We will contact you with sleep study results and/or next steps after the physician has reviewed your testing

## 2019-10-11 NOTE — PROGRESS NOTES
Review of Systems      Genitourinary none   Cardiology none   Gastrointestinal none   Neurology none   Constitutional fatigue   Integumentary none   Psychiatry anxiety, depression and mood change   Musculoskeletal joint pain   Pulmonary snoring   ENT none   Endocrine none   Hematological none

## 2019-10-12 NOTE — PROGRESS NOTES
Sleep Study Documentation    Pre-Sleep Study       Sleep testing procedure explained to patient:YES    Patient napped prior to study:NO    Caffeine:Dayshift worker after 12PM   Caffeine use:NO    Alcohol:Dayshift workers after 5PM: Alcohol use:NO    Typical day for patient:NO       Study Documentation    Sleep Study Indications: dx with MEAGHAN several yrs ago; stopped 2 yrs due to equipment broken, insomnia, snoring, always tired, daytime sleepiness    Sleep Study: Split Optimal PAP pressure: 6  Leak:Small  Snore:Eliminated  REM Obtained:yes  Supplemental O2: no    Minimum SaO2 87  Baseline SaO2 92  PAP mask tried (list all)full face simplus  PAP mask choice (final)Full Face Simplus Llam[employer] and Paykel  PAP mask type:full face  PAP pressure at which snoring was eliminated 6  Minimum SaO2 at final PAP pressure 94  Mode of Therapy:CPAP  ETCO2:No  CPAP changed to BiPAP:No    Mode of Therapy:CPAP    EKG abnormalities: no     EEG abnormalities: no    Sleep Study Recorded < 2 hours: N/A    Sleep Study Recorded > 2 hours but incomplete study: N/A    Sleep Study Recorded 6 hours but no sleep obtained: NO    Patient classification: employed       Post-Sleep Study    Medication used at bedtime or during sleep study:NO    Patient reports time it took to fall asleep:greater than 60 minutes    Patient reports waking up during study:1 to 2 times  Patient reports returning to sleep in greater than 30 minutes  Patient reports sleeping 2 to 4 hours without dreaming  Patient reports sleep during study:typical    Patient rated sleepiness: Very sleepy or tired    PAP treatment:yes: Post PAP treatment patient reports feeling better and  would wear PAP mask at home

## 2019-10-14 DIAGNOSIS — G47.30 SLEEP APNEA, UNSPECIFIED TYPE: Primary | ICD-10-CM

## 2019-10-17 ENCOUNTER — TELEPHONE (OUTPATIENT)
Dept: SLEEP CENTER | Facility: CLINIC | Age: 54
End: 2019-10-17

## 2019-10-24 ENCOUNTER — TELEPHONE (OUTPATIENT)
Dept: RHEUMATOLOGY | Facility: CLINIC | Age: 54
End: 2019-10-24

## 2019-10-24 DIAGNOSIS — Z79.52 CURRENT CHRONIC USE OF SYSTEMIC STEROIDS: ICD-10-CM

## 2019-10-24 DIAGNOSIS — M06.00 SERONEGATIVE EROSIVE RHEUMATOID ARTHRITIS (HCC): ICD-10-CM

## 2019-10-24 DIAGNOSIS — M06.00 RHEUMATOID ARTHRITIS WITH NEGATIVE RHEUMATOID FACTOR, INVOLVING UNSPECIFIED SITE (HCC): Primary | ICD-10-CM

## 2019-10-24 RX ORDER — PREDNISONE 10 MG/1
10 TABLET ORAL DAILY
Qty: 30 TABLET | Refills: 1 | Status: SHIPPED | OUTPATIENT
Start: 2019-10-24 | End: 2019-10-29

## 2019-10-24 NOTE — PROGRESS NOTES
Patient's Humira has been approved, has been sent to [facility]  They should be reaching out to patient soon

## 2019-10-24 NOTE — TELEPHONE ENCOUNTER
Please call patient to schedule her rheumatology follow-up for either 10am on Monday, Oct  28th or 4pm on Tuesday, Oct  29th      Thanks,  AMANDA

## 2019-10-24 NOTE — TELEPHONE ENCOUNTER
Patient cannot come on Monday  She has an appt at the sleep clinic to get set up with a CPAP at the same time   I scheduled her on [email or web address]

## 2019-10-28 ENCOUNTER — TELEPHONE (OUTPATIENT)
Dept: SLEEP CENTER | Facility: CLINIC | Age: 54
End: 2019-10-28

## 2019-10-29 ENCOUNTER — TELEPHONE (OUTPATIENT)
Dept: RHEUMATOLOGY | Facility: CLINIC | Age: 54
End: 2019-10-29

## 2019-10-29 ENCOUNTER — OFFICE VISIT (OUTPATIENT)
Dept: RHEUMATOLOGY | Facility: CLINIC | Age: 54
End: 2019-10-29
Payer: COMMERCIAL

## 2019-10-29 VITALS
SYSTOLIC BLOOD PRESSURE: 124 MMHG | RESPIRATION RATE: 18 BRPM | WEIGHT: 200 LBS | HEIGHT: 61 IN | DIASTOLIC BLOOD PRESSURE: 60 MMHG | BODY MASS INDEX: 37.76 KG/M2

## 2019-10-29 DIAGNOSIS — M17.0 PRIMARY OSTEOARTHRITIS OF BOTH KNEES: ICD-10-CM

## 2019-10-29 DIAGNOSIS — M06.00 SERONEGATIVE EROSIVE RHEUMATOID ARTHRITIS (HCC): Primary | ICD-10-CM

## 2019-10-29 DIAGNOSIS — Z79.899 HIGH RISK MEDICATION USE: ICD-10-CM

## 2019-10-29 DIAGNOSIS — E55.9 VITAMIN D DEFICIENCY: ICD-10-CM

## 2019-10-29 PROCEDURE — 99215 OFFICE O/P EST HI 40 MIN: CPT | Performed by: INTERNAL MEDICINE

## 2019-10-29 RX ORDER — HYDROXYCHLOROQUINE SULFATE 200 MG/1
200 TABLET, FILM COATED ORAL 2 TIMES DAILY
Qty: 60 TABLET | Refills: 3 | Status: SHIPPED | OUTPATIENT
Start: 2019-10-29 | End: 2019-12-03 | Stop reason: ALTCHOICE

## 2019-10-29 RX ORDER — PREDNISONE 1 MG/1
5 TABLET ORAL DAILY
Qty: 30 TABLET | Refills: 3 | Status: SHIPPED | OUTPATIENT
Start: 2019-10-29 | End: 2019-12-10 | Stop reason: ALTCHOICE

## 2019-10-29 RX ORDER — PREDNISONE 1 MG/1
TABLET ORAL
Qty: 120 TABLET | Refills: 3 | Status: SHIPPED | OUTPATIENT
Start: 2019-10-29 | End: 2020-03-13

## 2019-10-29 NOTE — PATIENT INSTRUCTIONS
Continue weekly Vitamin D  Decrease prednisone by 1mg every 2 weeks  Start Humira injections every 2 weeks  Start hydroxychloroquine twice a day  Use diclofenac gel on knees as needed  Do bloodwork before next visit    Return to clinic in 3 months

## 2019-10-29 NOTE — PROGRESS NOTES
Assessment and Plan: Robin Cruz is a 46 y o  female who presents for follow-up of her seronegative rheumatoid arthritis  Her RA symptoms are currently stable  She will be starting Humira every other week injections tomorrow while starting to taper her prednisone  Plan to taper patient's prednisone by 1mg every 2 weeks; she will taper her prednisone on the same day of each of her Humira injections  Also starting patient on the DMARD hydroxychloroquine 200mg po bid to work in conjunction with Humira to control her RA symptoms  Have explained to patient about the need for regular eye exams while on HCQ to monitor for plaquenil-related retinal toxicity  She is to continue her ergocalciferol 50,000 units po weekly for her Vit  D deficiency until her Vit  D level is repeated  Her recent DEXA scan was normal  She also is to apply diclofenac gel as needed for her knee osteoarthritis-related pain  She is to repeat bloodwork before her next clinic visit in 3 months  Plan:  Diagnoses and all orders for this visit:    Seronegative erosive rheumatoid arthritis ([address] )  -     predniSONE 5 mg tablet; Take 1 tablet (5 mg total) by mouth daily  -     predniSONE 1 mg tablet; Take 4 tabs daily for 2 wks, then 3 tabs daily for 2 wks, then 2 tabs daily for 2 wks, then 1 tab daily for 2 wks; take with 5mg tabs  -     CBC and differential  -     Comprehensive metabolic panel  -     Sedimentation rate, automated  -     C-reactive protein  -     hydroxychloroquine (PLAQUENIL) 200 mg tablet; Take 1 tablet (200 mg total) by mouth 2 (two) times a day    Primary osteoarthritis of both knees  -     diclofenac sodium (VOLTAREN) 1 %;  Apply 4 g topically 4 (four) times a day as needed (pain)    Vitamin D deficiency  -     Vitamin D 25 hydroxy    High risk medication use - Benefits and risks of hydroxychloroquine, including but not limited to retinal toxicity, corneal deposits, gastrointestinal side effects, and headaches were discussed with the patient  The need for a regular eye exam to monitor for ocular toxicity was also explained to the patient  Benefits and risks of TNF inhibitors discussed, and included but are not limited to leukopenia, reactivation of hepatitis B or TB, lymphoma, infusion or site reactions, exacerbation of heart failure, and increased risk of infections  A baseline CBC, CMP, Hepatitis B/C status, a CXR, and TB test were all checked  Will continue to monitor her CBC, CMP regularly  Reviewed with patient that it is recommended to continue getting annual flu vaccines, especially since she is starting a biologic  Also, recommended that she gets the [id number] pneumonia vaccine and Shingrix (killed shingles vaccine) as soon as possible due to being in an immunocompromised state while on a biologic  Follow-up plan: Return to clinic in 3 months      Rheumatic Disease Summary:  Mayte Valle is a 46 y o  female who originally presented on 9/16/19 as a Rheumatology consult referred by her PCP Alan Mireles MD for evaluation and management of her seronegative Rheumatoid arthritis  Patient had a DAS28 score of 3 32, consistent with moderate disease activity  She was having active inflammatory arthritis despite being on prednisone 10mg po daily, which she cannot stay on long-term without being tapered  Patient had already been tried on methotrexate and sulfasalazine DMARD therapy, both of which caused patient to develop a rash  It was deemed appropriate to pursue treatment with a biologic medication such as Humira to better control her disease at this time  Patient had a recent low Vit  D level, so prescribed ergocalciferol 50,000 units po weekly  Also ordered a DEXA scan to rule-out out osteoporosis given patient's history of long-term steroid use; this returned normal  Ordered a viral hepatitis panel and TB quantiferon in preparation for starting patient on Humira every other week SC injections   Had continued prednisone 10mg po daily for now, with plan to slowly taper off  Prescribed diclofenac gel to apply to painful knees and shoulders as needed for both her RA and OA  Ordered bilateral hand and feet x-rays to obtain patient's current baseline, which revealed and erosion at the 3rd DIP joint on the right hand, which is an atypical location for RA  Since patient had a history of miscarriage, ordered an antiphospholipid panel, which returned unremarkable  HPI   Heriberto Perdomo is a 46 y o   female who presents for follow-up of her seronegative rheumatoid arthritis  Since patient's last clinic visit on 09/16/2019, at which patient establish care with us, her symptoms have overall improved  She feels that ergocalciferol 50,000 units po weekly has helped her fatigue  Have gotten Humira approved, and patient has received the Humira injections in the mail already, but has not started yet  She continues on prednisone 10 mg p o  Daily  Patient admits to being on chronic prednisone for the past 2 years, and 10 mg p o  Daily for the past year  She is willing to start tapering down on the prednisone while starting the Humira  She continues to smoke about 2 cigarettes a day  Patient mainly complaining of morning stiffness in her knees and feet for one hour lately  The following portions of the patient's history were reviewed and updated as appropriate: allergies, current medications, past family history, past medical history, past social history, past surgical history and problem list     Review of Systems:   Review of Systems   Constitutional: Positive for fatigue  Negative for chills, fever and unexpected weight change  HENT: Negative for mouth sores and trouble swallowing  Eyes: Negative for pain and visual disturbance  Respiratory: Negative for cough and shortness of breath  Cardiovascular: Negative for chest pain and leg swelling  Gastrointestinal: Negative for abdominal pain, blood in stool, constipation, diarrhea and nausea  Genitourinary: Negative for hematuria  Musculoskeletal: Positive for arthralgias and joint swelling  Negative for back pain and myalgias  Skin: Negative for rash  Neurological: Negative for weakness and numbness  Hematological: Negative for adenopathy  Psychiatric/Behavioral: Negative for sleep disturbance         Home Medications:     Current Outpatient Medications:     acetaminophen (TYLENOL) 650 mg CR tablet, Take 650 mg by mouth every 12 (twelve) hours, Disp: , Rfl:     Adalimumab (HUMIRA PEN) 40 MG/0 4ML PNKT, Inject 40 mg under the skin every 14 (fourteen) days, Disp: 2 each, Rfl: 11    albuterol (2 5 mg/3 mL) 0 083 % nebulizer solution, TAKE 1 VIAL (2 5 MG TOTAL) BY NEBULIZATION EVERY 6 (SIX) HOURS AS NEEDED FOR WHEEZING, Disp: 150 mL, Rfl: 3    albuterol (PROVENTIL HFA,VENTOLIN HFA) 90 mcg/act inhaler, INHALE 2 PUFFS 4 (FOUR) TIMES A DAY AS DIRECTED, Disp: 8 5 Inhaler, Rfl: 5    budesonide-formoterol (SYMBICORT) 160-4 5 mcg/act inhaler, Inhale 2 puffs 2 (two) times a day, Disp: , Rfl:     buPROPion (WELLBUTRIN SR) 150 mg 12 hr tablet, 2 am, 1 pm, Disp: 90 tablet, Rfl: 5    clotrimazole-betamethasone (LOTRISONE) 1-0 05 % cream, Apply topically 2 (two) times a day, Disp: 30 g, Rfl: 0    ergocalciferol (VITAMIN D2) 50,000 units, Take 1 capsule (50,000 Units total) by mouth once a week, Disp: 12 capsule, Rfl: 0    fluticasone (FLONASE) 50 mcg/act nasal spray, 1 spray into each nostril as needed  , Disp: , Rfl:     levalbuterol (XOPENEX HFA) 45 mcg/act inhaler, Inhale 1-2 puffs every 4 (four) hours as needed for wheezing, Disp: 1 Inhaler, Rfl: 5    losartan-hydrochlorothiazide (HYZAAR) 100-25 MG per tablet, Take 1 tablet by mouth daily, Disp: 30 tablet, Rfl: 5    montelukast (SINGULAIR) 10 mg tablet, Take 1 tablet (10 mg total) by mouth daily at bedtime, Disp: 90 tablet, Rfl: 1    SPIRIVA RESPIMAT 1 25 MCG/ACT AERS inhaler, INHALE 2 INHALATIONS BY MOUTH DAILY, Disp: 3 Inhaler, Rfl: 1   spironolactone (ALDACTONE) 25 mg tablet, Take 25 mg by mouth as needed, Disp: , Rfl:     diclofenac sodium (VOLTAREN) 1 %, Apply 4 g topically 4 (four) times a day as needed (pain), Disp: 300 g, Rfl: 6    hydroxychloroquine (PLAQUENIL) 200 mg tablet, Take 1 tablet (200 mg total) by mouth 2 (two) times a day, Disp: 60 tablet, Rfl: 3    predniSONE 1 mg tablet, Take 4 tabs daily for 2 wks, then 3 tabs daily for 2 wks, then 2 tabs daily for 2 wks, then 1 tab daily for 2 wks; take with 5mg tabs, Disp: 120 tablet, Rfl: 3    predniSONE 5 mg tablet, Take 1 tablet (5 mg total) by mouth daily, Disp: 30 tablet, Rfl: 3    Objective:    Vitals:    10/29/19 1557   BP: 124/60   BP Location: Right arm   Patient Position: Sitting   Cuff Size: Standard   Resp: 18   Weight: 90 7 kg (200 lb)   Height: 5' 1" (1 549 m)       Physical Exam   Constitutional: She appears well-developed and well-nourished  She is cooperative  No distress  HENT:   Head: Normocephalic and atraumatic  Mouth/Throat: Oropharynx is clear and moist and mucous membranes are normal    Eyes: Conjunctivae and EOM are normal  No scleral icterus  Neck: Neck supple  No spinous process tenderness and no muscular tenderness present  No thyromegaly present  Cardiovascular: Normal rate, regular rhythm, S1 normal and S2 normal  Exam reveals no friction rub  No murmur heard  Pulmonary/Chest: Breath sounds normal  No respiratory distress  She has no wheezes  She has no rhonchi  She has no rales  Musculoskeletal: She exhibits no tenderness  Warm at bilateral wrists and MCPs, bilateral knee crepitus   Lymphadenopathy:     She has no cervical adenopathy  Neurological: She is alert  No sensory deficit  Skin: Skin is warm and dry  No rash noted  Nails show no clubbing  Psychiatric: She has a normal mood and affect       Musculoskeletal--Peripheral Joint Exam:  Physical Exam     Swelling:   Right hand: 2nd MCP and 3rd MCP  Left hand: 2nd MCP and 3rd MCP    TALAMANTES-28 tender joint count: 0  TALAMANTES-28 swollen joint count: 4  ESR (mm/hr): 25  Patient global assessment: 20  TALAMANTES-28 score: 3 09 (Low Disease Activity)    Disease Activity Monitoring  CRP 8/31/19: 14 3 (trending up)  ESR 8/31/19: 25 (trending down)      Reviewed labs and imaging  Imaging:   CXR 9/19/19: unremarkable    Bilateral Hand x-rays 9/16/19  IMPRESSION:  Equivocal erosion at the ulnar aspect of the 3rd DIP joint on the right  Bilateral Feet x-rays 9/16/19  Left foot: Mild joint space narrowing of the 1st MTP joint is seen  Degenerative spurring of the midfoot is noted with accessory ossicle adjacent to the tarsal navicular  Calcaneal spurring is seen  No erosions are identified  Right foot: Degenerative changes of the 1st MTP joint are noted  There is widening of the 5th PIP joint without change which may be related to prior surgery  No erosions are identified  Mild spurring of the midfoot is demonstrated with a sensory   ossicle adjacent to the navicular  The appearance is similar to 2016      DEXA Scan 9/16/19  RESULTS:   LUMBAR SPINE:  L1-L3,L4:  BMD 1 019 gm/cm2  T-score normal, -0 3  Z-score 0 7     LEFT TOTAL HIP:  BMD 1 076 gm/cm2  T-score above normal, +1 1  Z-score 1 8     LEFT FEMORAL NECK:  BMD 0 765 gm/cm2  T-score normal, -0 8  Z-score 0 3    Labs:   Office Visit on 10/01/2019   Component Date Value Ref Range Status     RAPID STREP A 10/01/2019 Negative  Negative Final   Appointment on 09/16/2019   Component Date Value Ref Range Status    Hepatitis B Surface Ag 09/16/2019 Non-reactive  Non-reactive, NonReactive - Confirmed Final    Hep A IgM 09/16/2019 Non-reactive  Non-reactive, Equivocal-Suggest Recollect Final    Hepatitis C Ab 09/16/2019 Non-reactive  Non-reactive Final    Hep B C IgM 09/16/2019 Non-reactive  Non-reactive Final   Consult on 09/16/2019   Component Date Value Ref Range Status    Hepatitis B Surface Ag 09/16/2019 Non-reactive  Non-reactive, NonReactive - Confirmed Final    Hepatitis C Ab 09/16/2019 Non-reactive  Non-reactive Final    Hep B C IgM 09/16/2019 Non-reactive  Non-reactive Final    Hep B Core Total Ab 09/16/2019 Non-reactive  Non-reactive Final    QFT Nil 09/16/2019 0 04  0 - 8 0 IU/ml Final    QFT TB1-NIL 09/16/2019 0 00  IU/ml Final    QFT TB2-NIL 09/16/2019 0 00  IU/ml Final    QFT Mitogen-NIL 09/16/2019 0 22  IU/ml Final    QFT Final Interpretation 09/16/2019 Indeterminate* Negative Final    Indeterminate results due to low Interferon-gamma in the mitogen minus nil result (<0 50 IU/ml) may occur due to low lymphocyte count, reduced lymphocyte activity, or inablility of the patient's lymphocytes to generate interferon-gamma  Indeterminate results due to a high level of Interferon-gamma in the Nil tube (>8 00 IU/ml) may occur due to a heterophile antibody or nonspecific Interferon-gamma in the patient's blood sample  Indeterminate results may occur due to incorrect collection, transport or handling of the blood specimen  Based on the given collection time and lab receipt time, this specimen meets acceptable criteria for testing  Repeat testing on a new specimen is suggested   PTT Lupus Anticoagulant 09/16/2019 40 8  0 0 - 51 9 sec Final    Dilute Viper Venom Time 09/16/2019 39 6  0 0 - 47 0 sec Final    DILUTE PROTHROMBIN TIME(DPT) 09/16/2019 40 9  0 0 - 55 0 sec Final    THROMBIN TIME (DRVW) 09/16/2019 17 2  0 0 - 23 0 sec Final    DPT CONFIRM RATIO 09/16/2019 1 22  0 00 - 1 40 Ratio Final    LUPUS REFLEX INTERPRETATION 09/16/2019 Comment:   Final    No lupus anticoagulant was detected      Anticardiolipin IgA 09/16/2019 <9  0 - 11 APL U/mL Final                              Negative:              <12                            Indeterminate:     12 - 20                            Low-Med Positive: >20 - 80                            High Positive:         >80    Anticardiolipin IgG 09/16/2019 <9  0 - 14 GPL U/mL Final Negative:              <15                            Indeterminate:     15 - 20                            Low-Med Positive: >20 - 80                            High Positive:         >80    Anticardiolipin IgM 09/16/2019 <9  0 - 12 MPL U/mL Final                              Negative:              <13                            Indeterminate:     13 - 20                            Low-Med Positive: >20 - 80                            High Positive:         >80    Beta-2 Glyco 1 IgG 09/16/2019 <9  0 - 20 GPI IgG units Final    The reference interval reflects a 3SD or 99th percentile interval,  which is thought to represent a potentially clinically significant  result in accordance with the International Consensus Statement on  the classification criteria for definitive antiphospholipid syndrome  (APS)  J Thromb Haem 2006;4:295-306   Beta-2 Glyco 1 IgA 09/16/2019 <9  0 - 25 GPI IgA units Final    The reference interval reflects a 3SD or 99th percentile interval,  which is thought to represent a potentially clinically significant  result in accordance with the International Consensus Statement on  the classification criteria for definitive antiphospholipid syndrome  (APS)  J Thromb Haem 2006;4:295-306   Beta-2 Glyco 1 IgM 09/16/2019 <9  0 - 32 GPI IgM units Final    The reference interval reflects a 3SD or 99th percentile interval,  which is thought to represent a potentially clinically significant  result in accordance with the International Consensus Statement on  the classification criteria for definitive antiphospholipid syndrome  (APS)  J Thromb Haem 2006;4:295-306     Appointment on 08/31/2019   Component Date Value Ref Range Status    WBC 08/31/2019 9 47  4 31 - 10 16 Thousand/uL Final    RBC 08/31/2019 4 55  3 81 - 5 12 Million/uL Final    Hemoglobin 08/31/2019 12 9  11 5 - 15 4 g/dL Final    Hematocrit 08/31/2019 41 5  34 8 - 46 1 % Final    MCV 08/31/2019 91  82 - 98 fL Final    MCH 08/31/2019 28 4  26 8 - 34 3 pg Final    MCHC 08/31/2019 31 1* 31 4 - 37 4 g/dL Final    RDW 08/31/2019 14 5  11 6 - 15 1 % Final    MPV 08/31/2019 9 4  8 9 - 12 7 fL Final    Platelets 87/33/1290 429* 149 - 390 Thousands/uL Final    nRBC 08/31/2019 0  /100 WBCs Final    Neutrophils Relative 08/31/2019 52  43 - 75 % Final    Immat GRANS % 08/31/2019 0  0 - 2 % Final    Lymphocytes Relative 08/31/2019 37  14 - 44 % Final    Monocytes Relative 08/31/2019 8  4 - 12 % Final    Eosinophils Relative 08/31/2019 2  0 - 6 % Final    Basophils Relative 08/31/2019 1  0 - 1 % Final    Neutrophils Absolute 08/31/2019 4 95  1 85 - 7 62 Thousands/µL Final    Immature Grans Absolute 08/31/2019 0 04  0 00 - 0 20 Thousand/uL Final    Lymphocytes Absolute 08/31/2019 3 50  0 60 - 4 47 Thousands/µL Final    Monocytes Absolute 08/31/2019 0 74  0 17 - 1 22 Thousand/µL Final    Eosinophils Absolute 08/31/2019 0 15  0 00 - 0 61 Thousand/µL Final    Basophils Absolute 08/31/2019 0 09  0 00 - 0 10 Thousands/µL Final    Sodium 08/31/2019 138  136 - 145 mmol/L Final    Potassium 08/31/2019 3 6  3 5 - 5 3 mmol/L Final    Chloride 08/31/2019 102  100 - 108 mmol/L Final    CO2 08/31/2019 31  21 - 32 mmol/L Final    ANION GAP 08/31/2019 5  4 - 13 mmol/L Final    BUN 08/31/2019 20  5 - 25 mg/dL Final    Creatinine 08/31/2019 0 84  0 60 - 1 30 mg/dL Final    Standardized to IDMS reference method    Glucose, Fasting 08/31/2019 68  65 - 99 mg/dL Final      Specimen collection should occur prior to Sulfasalazine administration due to the potential for falsely depressed results  Specimen collection should occur prior to Sulfapyridine administration due to the potential for falsely elevated results   Calcium 08/31/2019 9 1  8 3 - 10 1 mg/dL Final    AST 08/31/2019 11  5 - 45 U/L Final      Specimen collection should occur prior to Sulfasalazine administration due to the potential for falsely depressed results       ALT 08/31/2019 24  12 - 78 U/L Final      Specimen collection should occur prior to Sulfasalazine and/or Sulfapyridine administration due to the potential for falsely depressed results   Alkaline Phosphatase 08/31/2019 70  46 - 116 U/L Final    Total Protein 08/31/2019 7 0  6 4 - 8 2 g/dL Final    Albumin 08/31/2019 3 2* 3 5 - 5 0 g/dL Final    Total Bilirubin 08/31/2019 0 36  0 20 - 1 00 mg/dL Final    eGFR 08/31/2019 79  ml/min/1 73sq m Final    Sed Rate 08/31/2019 25* 0 - 20 mm/hour Final    CRP 08/31/2019 14 3* <3 0 mg/L Final    CORNEL 08/31/2019 Negative  Negative Final    Rheumatoid Factor 08/31/2019 Negative  Negative Final    Vit D, 25-Hydroxy 08/31/2019 13 8* 30 0 - 100 0 ng/mL Final    TSH 3RD GENERATON 08/31/2019 2 340  0 358 - 3 740 uIU/mL Final      The recommended reference ranges for TSH during pregnancy are as follows:   First trimester 0 1 to 2 5 uIU/mL   Second trimester  0 2 to 3 0 uIU/mL   Third trimester 0 3 to 3 0 uIU/m    Note: Normal ranges may not apply to patients who are transgender, non-binary, or whose legal sex, sex at birth, and gender identity differ  Using supplements with high doses of biotin 20 to more than 300 times greater than the adequate daily intake for adults of 30 mcg/day as established by the [place] [employer], can cause falsely depress results      Hepatitis C Ab 08/31/2019 Non-reactive  Non-reactive Final

## 2019-10-29 NOTE — TELEPHONE ENCOUNTER
Radha Rodriguez,    I noticed this patient has a previously scheduled appointment with me on 11/26 as well  Can you please remove it from my schedule, since I just saw her today? Her next follow-up will be in January, which is already scheduled      Thanks,   AMANDA

## 2019-11-11 ENCOUNTER — TELEPHONE (OUTPATIENT)
Dept: SLEEP CENTER | Facility: CLINIC | Age: 54
End: 2019-11-11

## 2019-11-11 NOTE — TELEPHONE ENCOUNTER
I called the pt  and advised her she may take the machine anywhere to sleep with  She understood and was having the machine brought in to her

## 2019-11-12 ENCOUNTER — TELEPHONE (OUTPATIENT)
Dept: FAMILY MEDICINE CLINIC | Facility: CLINIC | Age: 54
End: 2019-11-12

## 2019-11-12 NOTE — TELEPHONE ENCOUNTER
Patient states that she will need a replacement BP medication, [facility] does not have her's  [facility] uses [facility] on [address]   Please advise [phone]

## 2019-11-17 ENCOUNTER — TELEPHONE (OUTPATIENT)
Dept: RHEUMATOLOGY | Facility: CLINIC | Age: 54
End: 2019-11-17

## 2019-11-17 DIAGNOSIS — T78.40XA ALLERGIC REACTION, INITIAL ENCOUNTER: Primary | ICD-10-CM

## 2019-11-17 NOTE — TELEPHONE ENCOUNTER
Patient called while I'm on call, and it appears that she is having an allergic reaction to Humira, with a diffuse rash throughout her body  I asked her to go to her nearest Emergency Room, which she states is [facility] on [facility] [address] [facility] to be treated for allergic reaction  Have asked her to indefinitely hold Humira, and continue her current prednisone taper and dosing of hydroxychloroquine until she is seen in clinic again  Will follow-up with her regarding how she is doing, and if she needs to be seen back in clinic sooner than her next appointment

## 2019-11-18 RX ORDER — HYDROXYZINE HYDROCHLORIDE 25 MG/1
25 TABLET, FILM COATED ORAL EVERY 6 HOURS PRN
Qty: 120 TABLET | Refills: 0 | Status: SHIPPED | OUTPATIENT
Start: 2019-11-18 | End: 2019-12-09 | Stop reason: SDUPTHER

## 2019-11-18 RX ORDER — PREDNISONE 20 MG/1
60 TABLET ORAL DAILY
Qty: 21 TABLET | Refills: 0 | Status: SHIPPED | OUTPATIENT
Start: 2019-11-18 | End: 2019-11-25

## 2019-11-18 NOTE — TELEPHONE ENCOUNTER
Patient called stating the ER put her on prednisone for a week  She states they put her on 60 mg  Can she take this for a week then go back to the 8 mg? She was being weaned off of this  She is asking what to do   Please advise

## 2019-11-18 NOTE — TELEPHONE ENCOUNTER
Please let patient know that I would rather her be on prednisone 40mg daily for a week, then go back to 8mg daily      Thanks,  HM

## 2019-11-18 NOTE — TELEPHONE ENCOUNTER
Talked with patient, will have her on prednisone 60mg daily for a total of two weeks, then she can go back to 8mg daily  Have also prescribed hydroxyzine 25mg for patient to take as needed for itchiness

## 2019-11-27 ENCOUNTER — TELEPHONE (OUTPATIENT)
Dept: OBGYN CLINIC | Facility: HOSPITAL | Age: 54
End: 2019-11-27

## 2019-11-27 NOTE — TELEPHONE ENCOUNTER
Patient called in  Cb# [phone]    She said she is returning a phone call  Called the office and spoke to Chance Kaiser, no note in the patient's chart  She said that she will have someone reach out to the patient  I let the patient know and she also said to let the doctor know she is in the ED at [facility] for an issue  Per patient, she said for the same thing  Please advise

## 2019-11-27 NOTE — TELEPHONE ENCOUNTER
Just got off phone with patient; she seems to be having a severe allergic skin reaction to the Humira, is currently at the [facility] ED  Please call her on Friday to schedule an urgent follow-up visit with me within the next couple of weeks

## 2019-11-27 NOTE — TELEPHONE ENCOUNTER
Patient showed up in the [facility] office and they contacted me stating the patient still thinks she is having an allergic reaction  Her hands are all itchy and cracked and she is on the 60mg of prednisone   She is asking for a call back

## 2019-11-28 ENCOUNTER — TELEPHONE (OUTPATIENT)
Dept: OTHER | Facility: OTHER | Age: 54
End: 2019-11-28

## 2019-12-03 ENCOUNTER — OFFICE VISIT (OUTPATIENT)
Dept: FAMILY MEDICINE CLINIC | Facility: CLINIC | Age: 54
End: 2019-12-03
Payer: COMMERCIAL

## 2019-12-03 VITALS
SYSTOLIC BLOOD PRESSURE: 114 MMHG | BODY MASS INDEX: 40.59 KG/M2 | HEART RATE: 88 BPM | WEIGHT: 215 LBS | HEIGHT: 61 IN | DIASTOLIC BLOOD PRESSURE: 70 MMHG

## 2019-12-03 DIAGNOSIS — I10 ESSENTIAL HYPERTENSION: ICD-10-CM

## 2019-12-03 DIAGNOSIS — L98.9 SKIN LESION: ICD-10-CM

## 2019-12-03 DIAGNOSIS — Z23 NEED FOR INFLUENZA VACCINATION: ICD-10-CM

## 2019-12-03 DIAGNOSIS — L27.0 DRUG-INDUCED SKIN RASH: Primary | ICD-10-CM

## 2019-12-03 PROBLEM — Z72.0 TOBACCO ABUSE: Status: ACTIVE | Noted: 2019-11-27

## 2019-12-03 PROBLEM — F41.9 ANXIETY: Status: ACTIVE | Noted: 2019-11-27

## 2019-12-03 PROCEDURE — 90471 IMMUNIZATION ADMIN: CPT

## 2019-12-03 PROCEDURE — 90682 RIV4 VACC RECOMBINANT DNA IM: CPT

## 2019-12-03 PROCEDURE — 3074F SYST BP LT 130 MM HG: CPT | Performed by: FAMILY MEDICINE

## 2019-12-03 PROCEDURE — 99214 OFFICE O/P EST MOD 30 MIN: CPT | Performed by: FAMILY MEDICINE

## 2019-12-03 PROCEDURE — 3078F DIAST BP <80 MM HG: CPT | Performed by: FAMILY MEDICINE

## 2019-12-03 RX ORDER — DIPHENHYDRAMINE HCL 50 MG
50 CAPSULE ORAL EVERY 6 HOURS PRN
COMMUNITY
Start: 2019-11-28 | End: 2022-03-14 | Stop reason: ALTCHOICE

## 2019-12-03 RX ORDER — PREDNISONE 10 MG/1
10 TABLET ORAL DAILY
Refills: 1 | COMMUNITY
Start: 2019-11-11 | End: 2020-03-30 | Stop reason: SDUPTHER

## 2019-12-03 RX ORDER — VALSARTAN AND HYDROCHLOROTHIAZIDE 160; 25 MG/1; MG/1
1 TABLET ORAL DAILY
Qty: 30 TABLET | Refills: 5 | Status: SHIPPED | OUTPATIENT
Start: 2019-12-03 | End: 2020-03-13 | Stop reason: SDUPTHER

## 2019-12-03 RX ORDER — PREDNISONE 20 MG/1
20 TABLET ORAL DAILY
COMMUNITY
Start: 2019-11-29 | End: 2019-12-13

## 2019-12-03 NOTE — TELEPHONE ENCOUNTER
Doesn't look like this has been taken care of yet  patient is scheduled 1/28/19 how soon would you like to see her

## 2019-12-03 NOTE — TELEPHONE ENCOUNTER
Any open [place] spot within the next couple of weeks is fine, even today at 4pm if she can make it    Thanks!

## 2019-12-04 NOTE — PROGRESS NOTES
Assessment and Plan:    Problem List Items Addressed This Visit     None                 There are no diagnoses linked to this encounter  Subjective:      Patient ID: Omar Valdez is a 46 y o  female  CC:    Chief Complaint   Patient presents with    Follow-up     follow up to allergic reaction to Humira  Pt notes her hands are still swollen and cracked  -  [facility]    Hypertension     review BP medication  Pt states her med has been discontinued  -  [facility]       HPI:    F/u skin rash -improving slowly on prednisone,skin is very dry, BP is doing well      The following portions of the patient's history were reviewed and updated as appropriate: allergies, current medications, past family history, past medical history, past social history, past surgical history and problem list         Review of Systems   Constitutional: Negative for activity change, appetite change and unexpected weight change  Respiratory: Negative for shortness of breath  Cardiovascular: Negative for chest pain  Musculoskeletal: Positive for arthralgias  Skin: Positive for rash  Neurological: Negative for headaches  Psychiatric/Behavioral: The patient is nervous/anxious  Data to review:       Objective:    Vitals:    12/03/19 1920   BP: 114/70   BP Location: Left arm   Patient Position: Sitting   Cuff Size: Large   Pulse: 88   Weight: 97 5 kg (215 lb)   Height: 5' 1" (1 549 m)        Physical Exam   Constitutional: She appears well-developed and well-nourished  Cardiovascular: Normal rate, regular rhythm and normal heart sounds  Pulmonary/Chest: Effort normal and breath sounds normal    Musculoskeletal: She exhibits no edema  Lymphadenopathy:     She has no cervical adenopathy  Skin: Rash noted  Skin lesion below r eye   Vitals reviewed

## 2019-12-07 DIAGNOSIS — M06.00 SERONEGATIVE EROSIVE RHEUMATOID ARTHRITIS (HCC): ICD-10-CM

## 2019-12-07 DIAGNOSIS — Z79.52 CURRENT CHRONIC USE OF SYSTEMIC STEROIDS: ICD-10-CM

## 2019-12-07 RX ORDER — ERGOCALCIFEROL 1.25 MG/1
CAPSULE ORAL
Qty: 4 CAPSULE | Refills: 2 | Status: SHIPPED | OUTPATIENT
Start: 2019-12-07 | End: 2020-08-06 | Stop reason: ALTCHOICE

## 2019-12-09 ENCOUNTER — CONSULT (OUTPATIENT)
Dept: DERMATOLOGY | Facility: CLINIC | Age: 54
End: 2019-12-09
Payer: COMMERCIAL

## 2019-12-09 VITALS — WEIGHT: 214 LBS | TEMPERATURE: 98.9 F | BODY MASS INDEX: 40.4 KG/M2 | HEIGHT: 61 IN

## 2019-12-09 DIAGNOSIS — D48.5 NEOPLASM OF UNCERTAIN BEHAVIOR OF SKIN: Primary | ICD-10-CM

## 2019-12-09 DIAGNOSIS — L27.0 DRUG ERUPTION: ICD-10-CM

## 2019-12-09 DIAGNOSIS — L82.1 SEBORRHEIC KERATOSIS: ICD-10-CM

## 2019-12-09 DIAGNOSIS — T78.40XA ALLERGIC REACTION, INITIAL ENCOUNTER: ICD-10-CM

## 2019-12-09 DIAGNOSIS — B35.1 ONYCHOMYCOSIS: ICD-10-CM

## 2019-12-09 DIAGNOSIS — B35.3 TINEA PEDIS OF BOTH FEET: ICD-10-CM

## 2019-12-09 PROCEDURE — 99243 OFF/OP CNSLTJ NEW/EST LOW 30: CPT | Performed by: DERMATOLOGY

## 2019-12-09 RX ORDER — PRENATAL VIT 91/IRON/FOLIC/DHA 28-975-200
COMBINATION PACKAGE (EA) ORAL
Qty: 60 G | Refills: 3 | Status: SHIPPED | OUTPATIENT
Start: 2019-12-09 | End: 2022-03-14 | Stop reason: ALTCHOICE

## 2019-12-09 RX ORDER — HYDROXYZINE HYDROCHLORIDE 25 MG/1
25 TABLET, FILM COATED ORAL EVERY 6 HOURS PRN
Qty: 120 TABLET | Refills: 0 | Status: SHIPPED | OUTPATIENT
Start: 2019-12-09

## 2019-12-09 NOTE — PATIENT INSTRUCTIONS
1  DRUG ERUPTION    Physical Exam:   Anatomic Location Affected:  Trunk and extremities    Assessment and Plan:  Based on a thorough discussion of this condition and the management approach to it (including a comprehensive discussion of the known risks, side effects and potential benefits of treatment), the patient (family) agrees to implement the following specific plan:  Ivana Jensen not take Humira again   Triamcinolone ointment twice a day for 2 weeks   Mupirocin ointment to open sores   Moisturize with creams not lotions (vaseline, aquaphor, cerave   Waah with Dove bar soap, short luke warm showers   Vaseline or aquaphor or Cerave to moisturize,   Vaseline on hands with white cotton gloves at night     2  SEBORRHEIC KERATOSIS; NON-INFLAMED    Physical Exam:   Anatomic Location Affected:  Right lower eyelid  Assessment and Plan:  Based on a thorough discussion of this condition and the management approach to it (including a comprehensive discussion of the known risks, side effects and potential benefits of treatment), the patient (family) agrees to implement the following specific plan:   Reassure benign    Seborrheic Keratosis  A seborrheic keratosis is a harmless warty spot that appears during adult life as a common sign of skin aging  Seborrheic keratoses can arise on any area of skin, covered or uncovered, with the usual exception of the palms and soles  They do not arise from mucous membranes  Seborrheic keratoses can have highly variable appearance  Seborrheic keratoses are extremely common  It has been estimated that over 90% of adults over the age of 61 years have one or more of them  They occur in males and females of all races, typically beginning to erupt in the 35s or 45s  They are uncommon under the age of 21 years  The precise cause of seborrhoeic keratoses is not known  Seborrhoeic keratoses are considered degenerative in nature   As time goes by, seborrheic keratoses tend to become more numerous  Some people inherit a tendency to develop a very large number of them; some people may have hundreds of them  The name "seborrheic keratosis" is misleading, because these lesions are not limited to a seborrhoeic distribution (scalp, mid-face, chest, upper back), nor are they formed from sebaceous glands, nor are they associated with sebum -- which is greasy  Seborrheic keratosis may also be called "SK," "Seb K," "basal cell papilloma," "senile wart," or "barnacle "      Researchers have noted:   Eruptive seborrhoeic keratoses can follow sunburn or dermatitis   Skin friction may be the reason they appear in body folds   Viral cause (e g , human papillomavirus) seems unlikely   Stable and clonal mutations or activation of FRFR3, PIK3CA, RADHA, AKT1 and EGFR genes are found in seborrhoeic keratoses   Seborrhoeic keratosis can arise from solar lentigo   FRFR3 mutations also arise in solar lentigines  These mutations are associated with increased age and location on the head and neck, suggesting a role of ultraviolet radiation in these lesions   Seborrheic keratoses do not harbour tumour suppressor gene mutations   Epidermal growth factor receptor inhibitors, which are used to treat some cancers, often result in an increase in verrucal (warty) keratoses  There is no easy way to remove multiple lesions on a single occasion  Unless a specific lesion is "inflamed" and is causing pain or stinging/burning or is bleeding, most insurance companies do not authorize treatment  3  NEOPLASM OF UNCERTAIN BEHAVIOR OF SKIN    Physical Exam:   (Anatomic Location); (Size and Morphological Description); (Differential Diagnosis):  o Mid central back; pink scaly papule    Assessment and Plan:   I have discussed with the patient that a sample of skin via a "skin biopsy would be potentially helpful to further make a specific diagnosis under the microscope     Based on a thorough discussion of this condition and the management approach to it (including a comprehensive discussion of the known risks, side effects and potential benefits of treatment), the patient (family) agrees to implement the following specific plan:        Recommend Vaseline to site and stop picking lesion    4  TINEA PEDIS ("ATHLETE'S FOOT")    Physical Exam:   Anatomic Location Affected:  Bilateral feet      Assessment and Plan:  Based on a thorough discussion of this condition and the management approach to it (including a comprehensive discussion of the known risks, side effects and potential benefits of treatment), the patient (family) agrees to implement the following specific plan:   Soak feet with bleach in water(1 tablespoon to 1 gallon of water)   Lamisil cream twice a day    Tinea Pedis  Tinea pedis is a fungal infection of the foot and is in fact the most common fungal infection  Tinea pedis is caused by dermatophyte fungi with the three most common being Trichophyton (T ) rubrum, T  interdigitale and Epidermophyton floccosum  Tinea pedis most commonly involves the interdigital spaces, known as "athlete's foot " Other typical sites include the toenails, groin, and palms of the hands  There are four major manifestations of tinea pedis including chronic hyperkeratotic, chronic intertriginous, acute ulcerative and vesicobullous  Signs and symptoms include:    Itchiness, redness, and scaling between the toes   Scales covering the soles and sides of the feet   Blisters over the inner aspect of the feet    It is particularly common in hot, tropical, and urban environments where sweating in the feet facilitate fungal growth  Risk factors for development include:   Occlusive footwear   Excessive swearing   Diabetes or other underlying immunosuppression    Poor peripheral circulation     The diagnosis of tinea pedis can usually be made via good history and physical exam due to its characteristic clinical features   Diagnosis can be confirmed by examining skin scrapings under the microscope  Cultures are occasionally done but may not be necessary if fungi are seen under microscopy  Other diagnoses to consider if patients do not respond to therapy include psoriasis, contact dermatitis, and eczema  Tinea pedis can be treated with topical antifungal drugs applied to affected areas on a repeated basis (usually 2 twice a day) for 2 to 4 weeks  Common topical medications include topical ketoconazole, allylamines, butenafine, ciclopirox, and tolnaftate  In cases that do not respond to topical therapy, oral antifungal agents may be used which include terbinafine, itraconazole, fluconazole and griseofulvin  These oral agents are also used to treat tinea capitis (fungal infection of the scalp) and onychomycosis (fungal infection of the nails)  Those with pre-existing liver problems are usually screened for liver function prior to starting oral terbinafine  Tinea pedis can be prevented by making sure feet are clean and dry with protective footwear worn in communal facilities  Other recommendations are:   Using drying foot powders when wearing occlusive shoes    Thoroughly dry shoes and boots prior to wearing them    Making sure to clean contaminated bathroom floors with bleach    Treatment of family members and other close contacts    5  ONYCHOMYCOSIS ("FUNGAL NAIL")    Physical Exam:   Anatomic Location Affected:  Right first toenail      Assessment and Plan:  Based on a thorough discussion of this condition and the management approach to it (including a comprehensive discussion of the known risks, side effects and potential benefits of treatment), the patient (family) agrees to implement the following specific plan:   Soak feet with bleach in water(1 tablespoon to 1 gallon of water)   Lamisil cream twice a day    What are fungal nail infections? Fungal infection of the nails is also known as onychomycosis   It is increasingly common with increased age  It rarely affects children  Which organisms cause onychomycosis? Onychomycosis can be due to:  Dermatophytes such as Trichophyton rubrum (T  rubrum), T  interdigitale (tinea unguium)   Yeasts such as Candida albicans   Molds such as Scopulariopsis brevicaulis and Fusarium species  What are the clinical features of onychomycosis? Onychomycosis may affect one or more toenails and fingernails and most often involves the great toenail or the little toenail  It can present in one or several different patterns   Lateral onychomycosis: a white or yellow opaque streak appears at one side of the nail   Subungual hyperkeratosis: scaling occurs under the nail   Distal onycholysis: the end of the nail lifts  The free edge often crumbles   Superficial white onychomycosis: flaky white patches and pits appear on the top of the nail plate   Proximal onychomycosis:yellow spots appear in the half-moon (lunula)   Onychoma or dermatophytoma:a thick localized area of infection in the nail plate    Destruction of the nail    Tinea unguium often results from untreated tinea pedis (feet) or tinea manuum (hand)  It may follow an injury to the nail or inflammatory disease of the nail  Candida infection of the nail plate generally results from paronychia and starts near the nail fold (the cuticle)  The nail fold is swollen and red, lifted off the nail plate  White, yellow, green or black marks appear on the nearby nail and spread  The nail may lift off its bed and is tender if you press on it  Mold infections are similar in appearance to tinea unguium  Onychomycosis must be distinguished from other nail disorders     Bacterial infection especially Pseudomonas aeruginosa, which turns the nail black or green    Psoriasis    Eczema or dermatitis    Lichen planus    Viral warts    Onycholysis    Onychogryphosis (nail thickening and scaling under the nail), common in the elderly    How is the diagnosis of onychomycosis confirmed? Clippings should be taken from crumbling tissue at the end of the infected nail  The discolored surface of the nails can be scraped off  The debris can be scooped out from under the nail  The clippings and scrapings are sent to a mycology laboratory for microscopy and culture  Previous treatment can reduce the chance of growing the fungus successfully in a culture, so it is best to take the clippings before any treatment is commenced:   To confirm the diagnosis -- antifungal treatment will not be successful if there is another explanation for the nail condition    To identify the responsible organism  Molds and yeasts may require different treatment from dermatophyte fungi   Treatment may be required for a prolonged period and is expensive  Partially treated infection may be impossible to prove for many months as antifungal drugs can be detected even a year later  A nail biopsy may also reveal characteristic histopathological features of onychomycosis  What is the treatment of onychomycosis? Fingernail infections are usually cured more quickly and effectively than toenail infections  Mild infections affecting less than 50% of one or two nails may respond to topical antifungal medications, but cure usually requires an oral antifungal medication for several months  Devices used to treat onychomycosis  Recently, non-drug treatment has been developed to treat onychomycosis thus avoiding the side effects and risks of oral antifungal drugs  Lasers emitting infrared radiation are thought to kill fungi by the production of heat within the infected tissue  Laser treatment is reported to safely eradicate nail fungi with one to three, almost painless, sessions  Several lasers have been approved for this purpose by the FDA and other regulatory authorities   However, high-quality studies of efficacy are lacking, and existing studies indicate that laser treatment is less medically effective than topical or oral antifungal agents    Nd:YAG continuous, long or short-pulsed lasers   Ti:Sapphire modelocked laser   Diode laser

## 2019-12-09 NOTE — TELEPHONE ENCOUNTER
Please call and see if this patient can come in at 2pm tomorrow afternoon for urgent follow-up      Thanks,  AMANDA

## 2019-12-09 NOTE — PROGRESS NOTES
[address] [facility] Note     Patient Name: Muna Douglas  Encounter Date: 12/09/2019    Today's Chief Concerns:  Julieann Frankel Concern #1:  Lesion under right eye   Concern #2:  Rash all over from Humira    Past Medical History:  Have you ever had or currently have any of the following medical conditions or treatments? · HIV/AIDS: No  · Hepatitis B: No  · Hepatitis C: No   · Diabetes: No  · Tuberculosis: No  · Biologic Therapy/Chemotherapy: No  · Organ or Bone Marrow Transplantation: No  · Radiation Treatment: No  · Cancer (If Yes, which types)- No   · Rheumatoid arthritis: YES      Have you ever had any of the following skin conditions? · Melanoma? (If Yes, please provide more detail)- No  · Basal Cell Carcinoma: No  · Squamous Cell Carcinoma: No  · Sebaceous Cell Carcinoma: No  · Merkel Cell Carcinoma: No  · Angiosarcoma: No  · Blistering Sunburns: YES  · Eczema: No  · Psoriasis: No    Social History:    What is your current Smoking Status? Current smoker    What is/was your primary occupation? What are your hobbies/past-times? Family history:  Do any of your "first degree relatives" (parent, brother, sister, or child) have any of the following conditions? · Melanoma? (If Yes, which relatives?) No  · Eczema: No  · Asthma: YES, sister, father  · Hay Fever/Seasonal Allergies: No  · Psoriasis: No  · Arthritis: YES, mother, father ,sisters  · Thyroid Problems: YES, sister  · Lupus/Connective Tissue Disease: No  · Diabetes: YES, mother, father, sister and brother  · Stroke: YES, mother  · Blood Clots: No  · IBD/Crohn's/Ulcerative Colitis: No  · Vitiligo: No  · Scarring/Keloids: No  · Severe Acne: No  · Pancreatic Cancer: No  · Other known Skin Condition? If Yes, what condition and which relatives?   No    Current Medications:    Current Outpatient Medications:     albuterol (2 5 mg/3 mL) 0 083 % nebulizer solution, TAKE 1 VIAL (2 5 MG TOTAL) BY NEBULIZATION EVERY 6 (SIX) HOURS AS NEEDED FOR WHEEZING, Disp: 150 mL, Rfl: 3    albuterol (PROVENTIL HFA,VENTOLIN HFA) 90 mcg/act inhaler, INHALE 2 PUFFS 4 (FOUR) TIMES A DAY AS DIRECTED, Disp: 8 5 Inhaler, Rfl: 5    budesonide-formoterol (SYMBICORT) 160-4 5 mcg/act inhaler, Inhale 2 puffs 2 (two) times a day, Disp: , Rfl:     buPROPion (WELLBUTRIN SR) 150 mg 12 hr tablet, 2 am, 1 pm, Disp: 90 tablet, Rfl: 5    ergocalciferol (VITAMIN D2) 50,000 units, TAKE ONE CAPSULE BY MOUTH ONE TIME PER WEEK, Disp: 4 capsule, Rfl: 2    fluticasone (FLONASE) 50 mcg/act nasal spray, 1 spray into each nostril as needed  , Disp: , Rfl:     levalbuterol (XOPENEX HFA) 45 mcg/act inhaler, Inhale 1-2 puffs every 4 (four) hours as needed for wheezing, Disp: 1 Inhaler, Rfl: 5    montelukast (SINGULAIR) 10 mg tablet, Take 1 tablet (10 mg total) by mouth daily at bedtime, Disp: 90 tablet, Rfl: 1    predniSONE 20 mg tablet, Take 20 mg by mouth daily, Disp: , Rfl:     valsartan-hydrochlorothiazide (DIOVAN-HCT) 160-25 MG per tablet, Take 1 tablet by mouth daily, Disp: 30 tablet, Rfl: 5    acetaminophen (TYLENOL) 650 mg CR tablet, Take 650 mg by mouth every 12 (twelve) hours, Disp: , Rfl:     Adalimumab (HUMIRA PEN) 40 MG/0 4ML PNKT, Inject 40 mg under the skin every 14 (fourteen) days (Patient not taking: Reported on 12/9/2019), Disp: 2 each, Rfl: 11    clotrimazole-betamethasone (LOTRISONE) 1-0 05 % cream, Apply topically 2 (two) times a day (Patient not taking: Reported on 12/9/2019), Disp: 30 g, Rfl: 0    diphenhydrAMINE (BENADRYL) 50 mg capsule, Take 50 mg by mouth every 6 (six) hours as needed, Disp: , Rfl:     hydrOXYzine HCL (ATARAX) 25 mg tablet, TAKE 1 TABLET (25 MG TOTAL) BY MOUTH EVERY 6 (SIX) HOURS AS NEEDED FOR ITCHING, Disp: 120 tablet, Rfl: 0    predniSONE 1 mg tablet, Take 4 tabs daily for 2 wks, then 3 tabs daily for 2 wks, then 2 tabs daily for 2 wks, then 1 tab daily for 2 wks; take with 5mg tabs (Patient not taking: Reported on 12/9/2019), Disp: 120 tablet, Rfl: 3    predniSONE 10 mg tablet, Take 10 mg by mouth daily, Disp: , Rfl: 1    predniSONE 5 mg tablet, Take 1 tablet (5 mg total) by mouth daily (Patient not taking: Reported on 12/9/2019), Disp: 30 tablet, Rfl: 3    SPIRIVA RESPIMAT 1 25 MCG/ACT AERS inhaler, INHALE 2 INHALATIONS BY MOUTH DAILY (Patient not taking: Reported on 12/9/2019), Disp: 3 Inhaler, Rfl: 1    spironolactone (ALDACTONE) 25 mg tablet, Take 25 mg by mouth as needed, Disp: , Rfl:     Specific Alerts:    Have you been seen by a [facility] Dermatologist in the last 3 years? No    Are you pregnant or planning to become pregnant? No    Are you currently or planning to be nursing or breast feeding? No    Allergies   Allergen Reactions    Sulfasalazine Rash     rash    Other Rash     Adhesive tape and adhesive bandaids    Wound Dressing Adhesive Other (See Comments)     rash, itchy    Adalimumab Itching and Rash       May we call your Preferred Phone number to discuss your specific medical information? YES    May we leave a detailed message that includes your specific medical information? YES    Have you traveled outside of the [facility] in the past 3 months? No    Do you currently have a pacemaker or defibrillator? No    Do you have any artificial heart valves, joints, plates, screws, rods, stents, pins, etc? No   - If Yes, were any placed within the last 2 years? Do you require any medications prior to a surgical procedure? No   - If Yes, for which procedure? - If Yes, what medications to you require? Are you taking any medications that cause you to bleed more easily ("blood thinners") No    Have you ever experienced a rapid heartbeat with epinephrine? No    Have you ever been treated with "gold" (gold sodium thiomalate) therapy? No    [facility] can help with wrinkles, "laugh lines," facial volume loss, "double chin," "love handles," age spots, and more   Are you interested in learning today about some of the skin enhancement procedures that we offer? (If Yes, please provide more detail) No    Review of Systems:  Have you recently had or currently have any of the following? · Fever or chills: No  · Night Sweats: No  · Headaches: No  · Weight Gain: No  · Weight Loss: No  · Blurry Vision: No  · Nausea: No  · Vomiting: No  · Diarrhea: No  · Blood in Stool: No  · Abdominal Pain: No  · Itchy Skin: YES  · Painful Joints: No  · Swollen Joints: YES, hands  · Muscle Pain: No  · Irregular Mole: No  · Sun Burn: YES  · Dry Skin: YES  · Skin Color Changes: No  · Scar or Keloid: No  · Cold Sores/Fever Blisters: YES  · Bacterial Infections/MRSA: No  · Anxiety: YES, treated  · Depression: YES, treated  · Suicidal or Homicidal Thoughts: No      PHYSICAL EXAM:      Was a chaperone (Derm Clinical Assistant) present for the entirety of the Physical Exam? YES    Did the Dermatology Team specifically ask and  the patient on the importance of a Full Skin Exam to be sure that nothing is missed clinically?  YES    Did the patient request or accept a Full Skin Exam?  YES    Did the patient specifically refuse to have the areas "under-the-bra" examined by the Dermatologist? YES    Did the patient specifically refuse to have the areas "under-the-underwear" examined by the Dermatologist? YES      CONSTITUTIONAL:   Vitals:    12/09/19 0914   Temp: 98 9 °F (37 2 °C)   TempSrc: Tympanic   Weight: 97 1 kg (214 lb)   Height: 5' 1" (1 549 m)       PSYCH: Normal mood and affect  EYES: Normal conjunctiva  ENT: Normal lips and oral mucosa  CARDIOVASCULAR: No edema  RESPIRATORY: Normal respirations      FULL ORGAN SYSTEM SKIN EXAM (SKIN)  Hair, Scalp, Ears, Face Normal except as noted below in Assessment   Neck, Cervical Chain Nodes Normal except as noted below in Assessment   Right Arm/Hand/Fingers Normal except as noted below in Assessment   Left Arm/Hand/Fingers Normal except as noted below in Assessment   Chest/Breasts deferred Roly Gore areas Normal except as noted below in Assessment   Abdomen, Umbilicus Normal except as noted below in Assessment   Back/Spine Normal except as noted below in Assessment   Groin/Genitalia/Buttocks deferred   Right Leg, Foot, Toes Normal except as noted below in Assessment   Left Leg, Foot, Toes Normal except as noted below in Assessment        ASSESSMENT AND PLAN BY DIAGNOSIS:    History of Present Condition:     Duration:  How long has this been an issue for you?    o  Greater than 1 year   Location Affected:  Where on the body is this affecting you?    o  Right lower eyelid   Quality:  Is there any bleeding, pain, itch, burning/irritation, or redness associated with the skin lesion?    o  Growing , bleeding   Severity:  Describe any bleeding, pain, itch, burning/irritation, or redness on a scale of 1 to 10 (with 10 being the worst)  o  5   Timing:  Does this condition seem to be there pretty constantly or do you notice it more at specific times throughout the day?    o  Constantly   Context:  Have you ever noticed that this condition seems to be associated with specific activities you do?    o  Denies   Modifying Factors:    o Anything that seems to make the condition worse?    -  Denies  o What have you tried to do to make the condition better? -  Denies   Associated Signs and Symptoms:  Does this skin lesion seem to be associated with any of the followin  SEBORRHEIC KERATOSIS; NON-INFLAMED    Physical Exam:   Anatomic Location Affected:  Right lower eyelid   Morphological Description:  raised, waxy, smooth to warty textured, brown, discrete, "stuck-on" appearing papule    Additional History of Present Condition:  Patient reports new bumps on the skin  Picks at it and it bleeds      Assessment and Plan:  Based on a thorough discussion of this condition and the management approach to it (including a comprehensive discussion of the known risks, side effects and potential benefits of treatment), the patient (family) agrees to implement the following specific plan:   Reassure benign   Can remove if irritating, however would leave a scar         1  Likely resolving DRUG ERUPTION from humira, rash began after 2nd injection, photos of morbilliform pink papules and plaques    Physical Exam:   Anatomic Location Affected:  Trunk and extremities   Morphological Description:  Diffuse xerosis and excoriated pink papules      Additional History of Present Condition:  S/P 2 Humira injections for rheumatoid arthritis  On prednisone now    Assessment and Plan:  Based on a thorough discussion of this condition and the management approach to it (including a comprehensive discussion of the known risks, side effects and potential benefits of treatment), the patient (family) agrees to implement the following specific plan:   Do not take Humira again   Triamcinolone ointment twice a day for 2 weeks   Mupirocin ointment to open sores   Moisturize with creams not lotions (vaseline, aquaphor, cerave)   Wash with [employer], short luke warm showers   Vaseline or aquaphor or Cerave to moisturize,   Vaseline on hands with white cotton gloves at night         3  NEOPLASM OF UNCERTAIN BEHAVIOR OF SKIN, possibly actinic keratosis vs SCCis vs irritated SK    Physical Exam:   (Anatomic Location); (Size and Morphological Description); (Differential Diagnosis):  o Mid central back; pink scaly papule      Assessment and Plan:   I have discussed with the patient that a sample of skin via a "skin biopsy would be potentially helpful to further make a specific diagnosis under the microscope     Based on a thorough discussion of this condition and the management approach to it (including a comprehensive discussion of the known risks, side effects and potential benefits of treatment), the patient (family) agrees to implement the following specific plan:        Recommend Vaseline to site and stop picking lesion  Re-evaluate in 1 month    4  TINEA PEDIS ("ATHLETE'S FOOT")    Physical Exam:   Anatomic Location Affected:  Bilateral feet   Morphological Description:  scaling plaques        Assessment and Plan:  Based on a thorough discussion of this condition and the management approach to it (including a comprehensive discussion of the known risks, side effects and potential benefits of treatment), the patient (family) agrees to implement the following specific plan:   Soak feet with dilute bleach in water(1 tablespoon to 1 gallon of water)   Lamisil cream twice a day         5   ONYCHOMYCOSIS ("FUNGAL NAIL")    Physical Exam:   Anatomic Location Affected:  Right first toenail   Morphological Description:  Yellow thickened nail with subungual debris      Assessment and Plan:  Based on a thorough discussion of this condition and the management approach to it (including a comprehensive discussion of the known risks, side effects and potential benefits of treatment), the patient (family) agrees to implement the following specific plan:   Soak feet with dilute bleach in water(1 tablespoon to 1 gallon of water)   Lamisil cream twice a day   Discussed treatment with terbinafine orally, would need nail clipping to confirm diagnosis          Scribe Attestation    I,:   Trini Ty MA am acting as a scribe while in the presence of the attending physician :        I,:   Elidia Du MD personally performed the services described in this documentation    as scribed in my presence :

## 2019-12-10 ENCOUNTER — OFFICE VISIT (OUTPATIENT)
Dept: RHEUMATOLOGY | Facility: CLINIC | Age: 54
End: 2019-12-10
Payer: COMMERCIAL

## 2019-12-10 VITALS — BODY MASS INDEX: 40.59 KG/M2 | HEIGHT: 61 IN | RESPIRATION RATE: 18 BRPM | WEIGHT: 215 LBS

## 2019-12-10 DIAGNOSIS — M06.00 RHEUMATOID ARTHRITIS WITH NEGATIVE RHEUMATOID FACTOR, INVOLVING UNSPECIFIED SITE (HCC): ICD-10-CM

## 2019-12-10 DIAGNOSIS — R21 RASH: Primary | ICD-10-CM

## 2019-12-10 PROCEDURE — 99215 OFFICE O/P EST HI 40 MIN: CPT | Performed by: INTERNAL MEDICINE

## 2019-12-10 RX ORDER — PREDNISONE 1 MG/1
5 TABLET ORAL DAILY
Qty: 7 TABLET | Refills: 0 | Status: SHIPPED | OUTPATIENT
Start: 2019-12-10 | End: 2019-12-17

## 2019-12-10 NOTE — PROGRESS NOTES
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  Besides rash, which is now resolved, patient had no signs or symptoms of a vasculitis, such as kidney or lung involvement  At this time, will continue to hold off initiating any further DMARD or biologic therapy for patient's seronegative RA  Asked patient to increase her prednisone to 30mg po daily for a week, then decrease to 20mg po daily for a week, then 15mg po daily for a week, then 10mg po daily for a week, then go down by 1mg every two weeks as tolerated  She is to continue ergocalciferol 50,000 units po weekly for her Vit  D deficiency  Will repeat her ANCA blood test and obtain the muscle enzymes CK and aldolase before her next clinic visit  Plan:  Diagnoses and all orders for this visit:    Rash  -     Anti-neutrophilic cytoplasmic antibody  -     CK  -     Aldolase    Rheumatoid arthritis with negative rheumatoid factor, involving unspecified site (HCC)  -     predniSONE 5 mg tablet; Take 1 tablet (5 mg total) by mouth daily for 7 days    Follow-up plan: Already has follow-up appointment on 1/28/20      Rheumatic Disease Summary:  Adryan Sutton a 46 y  o  female who originally presented on 9/16/19 as a Rheumatology consult referred by her Janeen Martinez MD for evaluation and management of her seronegative Rheumatoid arthritis  Patient had a DAS28 score of 3 32, consistent with moderate disease activity  She was having active inflammatory arthritis despite being on prednisone 10mg po daily, which she cannot stay on long-term without being tapered   Patient had already been tried on methotrexate and sulfasalazine DMARD therapy, both of which caused patient to develop a rash  It was deemed appropriate to pursue treatment with a biologic medication such as Humira to better control her disease, though it took several weeks to get approved  Vit  D level was low, which is being supplemented  DEXA scan returned normal  Prescribed diclofenac gel to apply to painful knees and shoulders as needed for both her RA and OA  Ordered bilateral hand and feet x-rays to obtain patient's latest baseline, which revealed and erosion at the 3rd DIP joint on the right hand, which is an atypical location for RA  Since patient had a history of miscarriage, ordered an antiphospholipid panel, which returned unremarkable  She presented for follow-up on 10/29/19, at which time she had not yet started on Humira, and her RA symptoms were overall stable  She was encouraged to go ahead and start Humira while starting to taper her prednisone by 1mg every 2 weeks  Also had started patient on the DMARD hydroxychloroquine 200mg po bid to work in conjunction with Humira to control her RA symptoms  She was to continue her ergocalciferol 50,000 units po weekly for her Vit  D deficiency until her Vit  D level is repeated  She was to continue to apply diclofenac gel as needed for her knee osteoarthritis-related pain       HPI   Ayden Warner is a 53 y o   female who presents today as an urgent clinic visit after being managed at the [facility] ED for an extensive rash that she developed as an allergic reaction to the second time she self-administered Humira  She has discontinued both Humira and hydroxychloroquine, and joint symptoms are not currently bothering her  She was on prednisone 60mg po daily for a week, then 40mg for 2 days, then 20mg for the past week  Her rash has significantly improved  She does not want to try any other DMARD or biologic medication for her seronegative RA at this time   Reviewed outside medical records from recent [facility] ED visit  The following portions of the patient's history were reviewed and updated as appropriate: allergies, current medications, past family history, past medical history, past social history, past surgical history and problem list     Review of Systems:   Review of Systems   Constitutional: Negative for chills, fatigue, fever and unexpected weight change  HENT: Negative for mouth sores and trouble swallowing  Eyes: Negative for pain and visual disturbance  Respiratory: Negative for cough and shortness of breath  Cardiovascular: Negative for chest pain and leg swelling  Gastrointestinal: Negative for abdominal pain, blood in stool, constipation, diarrhea and nausea  Genitourinary: Negative for hematuria  Musculoskeletal: Positive for arthralgias and joint swelling  Negative for back pain and myalgias  Skin: Positive for color change and rash  Neurological: Negative for weakness and numbness  Hematological: Negative for adenopathy  Psychiatric/Behavioral: Negative for sleep disturbance         Home Medications:     Current Outpatient Medications:     acetaminophen (TYLENOL) 650 mg CR tablet, Take 650 mg by mouth every 12 (twelve) hours, Disp: , Rfl:     albuterol (2 5 mg/3 mL) 0 083 % nebulizer solution, TAKE 1 VIAL (2 5 MG TOTAL) BY NEBULIZATION EVERY 6 (SIX) HOURS AS NEEDED FOR WHEEZING, Disp: 150 mL, Rfl: 3    albuterol (PROVENTIL HFA,VENTOLIN HFA) 90 mcg/act inhaler, INHALE 2 PUFFS 4 (FOUR) TIMES A DAY AS DIRECTED, Disp: 8 5 Inhaler, Rfl: 5    budesonide-formoterol (SYMBICORT) 160-4 5 mcg/act inhaler, Inhale 2 puffs 2 (two) times a day, Disp: , Rfl:     buPROPion (WELLBUTRIN SR) 150 mg 12 hr tablet, 2 am, 1 pm, Disp: 90 tablet, Rfl: 5    ergocalciferol (VITAMIN D2) 50,000 units, TAKE ONE CAPSULE BY MOUTH ONE TIME PER WEEK, Disp: 4 capsule, Rfl: 2    fluticasone (FLONASE) 50 mcg/act nasal spray, 1 spray into each nostril as needed  , Disp: , Rfl:   hydrOXYzine HCL (ATARAX) 25 mg tablet, TAKE 1 TABLET (25 MG TOTAL) BY MOUTH EVERY 6 (SIX) HOURS AS NEEDED FOR ITCHING, Disp: 120 tablet, Rfl: 0    levalbuterol (XOPENEX HFA) 45 mcg/act inhaler, Inhale 1-2 puffs every 4 (four) hours as needed for wheezing, Disp: 1 Inhaler, Rfl: 5    montelukast (SINGULAIR) 10 mg tablet, Take 1 tablet (10 mg total) by mouth daily at bedtime, Disp: 90 tablet, Rfl: 1    mupirocin (BACTROBAN) 2 % ointment, Apply twice daily to open sores, Disp: 22 g, Rfl: 3    predniSONE 10 mg tablet, Take 10 mg by mouth daily, Disp: , Rfl: 1    spironolactone (ALDACTONE) 25 mg tablet, Take 25 mg by mouth as needed, Disp: , Rfl:     terbinafine (LAMISIL AT) 1 % cream, Apply topically to feet twice a day, Disp: 60 g, Rfl: 3    triamcinolone (KENALOG) 0 1 % ointment, Apply topically twice a day  For 2 weeks, Disp: 453 6 g, Rfl: 1    valsartan-hydrochlorothiazide (DIOVAN-HCT) 160-25 MG per tablet, Take 1 tablet by mouth daily, Disp: 30 tablet, Rfl: 5    clotrimazole-betamethasone (LOTRISONE) 1-0 05 % cream, Apply topically 2 (two) times a day (Patient not taking: Reported on 12/9/2019), Disp: 30 g, Rfl: 0    diphenhydrAMINE (BENADRYL) 50 mg capsule, Take 50 mg by mouth every 6 (six) hours as needed, Disp: , Rfl:     predniSONE 1 mg tablet, Take 4 tabs daily for 2 wks, then 3 tabs daily for 2 wks, then 2 tabs daily for 2 wks, then 1 tab daily for 2 wks; take with 5mg tabs (Patient not taking: Reported on 12/9/2019), Disp: 120 tablet, Rfl: 3    SPIRIVA RESPIMAT 1 25 MCG/ACT AERS inhaler, INHALE 2 INHALATIONS BY MOUTH DAILY (Patient not taking: Reported on 12/9/2019), Disp: 3 Inhaler, Rfl: 1    Objective:    Vitals:    12/10/19 1425   Resp: 18   Weight: 97 5 kg (215 lb)   Height: 5' 1" (1 549 m)       Physical Exam   Constitutional: She appears well-developed and well-nourished  She is cooperative  No distress  HENT:   Head: Normocephalic and atraumatic     Mouth/Throat: Oropharynx is clear and moist and mucous membranes are normal    Eyes: Conjunctivae and EOM are normal  No scleral icterus  Neck: Neck supple  No spinous process tenderness and no muscular tenderness present  No thyromegaly present  Cardiovascular: Normal rate, regular rhythm, S1 normal and S2 normal  Exam reveals no friction rub  No murmur heard  Pulmonary/Chest: Breath sounds normal  No respiratory distress  She has no wheezes  She has no rhonchi  She has no rales  Musculoskeletal: She exhibits no tenderness  Lymphadenopathy:     She has no cervical adenopathy  Neurological: She is alert  No sensory deficit  Skin: Skin is warm and dry  Rash noted  Nails show no clubbing  Erythematous, dry, cracked skin on arms   Psychiatric: She has a normal mood and affect  Reviewed labs and imaging  Imaging: Outside Bilateral Hand x-rays 11/291/9  Small areas suggestive of erosions, most notable right hand lunate,  along the scapholunate joint  Mild osteoarthritis of the IP joints  Right third finger DIP joint differential includes gouty arthropathy as well as arthritis  Outside Bilateral Feet x-rays 11/29/19  IMPRESSION:  Impression: Degenerative changes are demonstrated at the right and at the left foot  No erosive arthropathy is seen        Labs:   Outside Labs from [facility] 1/27-28/19: anti-dsDNA negative, anti-SSA/SSB negative, anti-Smith negative, anti-RNP negative, anti-Scl70 Ab negative, ANCA slightly positive at 1:80, AR-3 positive at 24, normal C3, C4 elevated at 40 7, CRP <3, cryoglobulin negative    Office Visit on 10/01/2019   Component Date Value Ref Range Status     RAPID STREP A 10/01/2019 Negative  Negative Final   Appointment on 09/16/2019   Component Date Value Ref Range Status    Hepatitis B Surface Ag 09/16/2019 Non-reactive  Non-reactive, NonReactive - Confirmed Final    Hep A IgM 09/16/2019 Non-reactive  Non-reactive, Equivocal-Suggest Recollect Final    Hepatitis C Ab 09/16/2019 Non-reactive Non-reactive Final    Hep B C IgM 09/16/2019 Non-reactive  Non-reactive Final   Consult on 09/16/2019   Component Date Value Ref Range Status    Hepatitis B Surface Ag 09/16/2019 Non-reactive  Non-reactive, NonReactive - Confirmed Final    Hepatitis C Ab 09/16/2019 Non-reactive  Non-reactive Final    Hep B C IgM 09/16/2019 Non-reactive  Non-reactive Final    Hep B Core Total Ab 09/16/2019 Non-reactive  Non-reactive Final    QFT Nil 09/16/2019 0 04  0 - 8 0 IU/ml Final    QFT TB1-NIL 09/16/2019 0 00  IU/ml Final    QFT TB2-NIL 09/16/2019 0 00  IU/ml Final    QFT Mitogen-NIL 09/16/2019 0 22  IU/ml Final    QFT Final Interpretation 09/16/2019 Indeterminate* Negative Final    Indeterminate results due to low Interferon-gamma in the mitogen minus nil result (<0 50 IU/ml) may occur due to low lymphocyte count, reduced lymphocyte activity, or inablility of the patient's lymphocytes to generate interferon-gamma  Indeterminate results due to a high level of Interferon-gamma in the Nil tube (>8 00 IU/ml) may occur due to a heterophile antibody or nonspecific Interferon-gamma in the patient's blood sample  Indeterminate results may occur due to incorrect collection, transport or handling of the blood specimen  Based on the given collection time and lab receipt time, this specimen meets acceptable criteria for testing  Repeat testing on a new specimen is suggested   PTT Lupus Anticoagulant 09/16/2019 40 8  0 0 - 51 9 sec Final    Dilute Viper Venom Time 09/16/2019 39 6  0 0 - 47 0 sec Final    DILUTE PROTHROMBIN TIME(DPT) 09/16/2019 40 9  0 0 - 55 0 sec Final    THROMBIN TIME (DRVW) 09/16/2019 17 2  0 0 - 23 0 sec Final    DPT CONFIRM RATIO 09/16/2019 1 22  0 00 - 1 40 Ratio Final    LUPUS REFLEX INTERPRETATION 09/16/2019 Comment:   Final    No lupus anticoagulant was detected      Anticardiolipin IgA 09/16/2019 <9  0 - 11 APL U/mL Final                              Negative:              <12 Indeterminate:     12 - 20                            Low-Med Positive: >20 - 80                            High Positive:         >80    Anticardiolipin IgG 09/16/2019 <9  0 - 14 GPL U/mL Final                              Negative:              <15                            Indeterminate:     15 - 20                            Low-Med Positive: >20 - 80                            High Positive:         >80    Anticardiolipin IgM 09/16/2019 <9  0 - 12 MPL U/mL Final                              Negative:              <13                            Indeterminate:     13 - 20                            Low-Med Positive: >20 - 80                            High Positive:         >80    Beta-2 Glyco 1 IgG 09/16/2019 <9  0 - 20 GPI IgG units Final    The reference interval reflects a 3SD or 99th percentile interval,  which is thought to represent a potentially clinically significant  result in accordance with the International Consensus Statement on  the classification criteria for definitive antiphospholipid syndrome  (APS)  J Thromb Haem 2006;4:295-306   Beta-2 Glyco 1 IgA 09/16/2019 <9  0 - 25 GPI IgA units Final    The reference interval reflects a 3SD or 99th percentile interval,  which is thought to represent a potentially clinically significant  result in accordance with the International Consensus Statement on  the classification criteria for definitive antiphospholipid syndrome  (APS)  J Thromb Haem 2006;4:295-306   Beta-2 Glyco 1 IgM 09/16/2019 <9  0 - 32 GPI IgM units Final    The reference interval reflects a 3SD or 99th percentile interval,  which is thought to represent a potentially clinically significant  result in accordance with the International Consensus Statement on  the classification criteria for definitive antiphospholipid syndrome  (APS)  J Thromb Haem 2006;4:295-306     Appointment on 08/31/2019   Component Date Value Ref Range Status    WBC 08/31/2019 9 47  4 31 - 10 16 Thousand/uL Final    RBC 08/31/2019 4 55  3 81 - 5 12 Million/uL Final    Hemoglobin 08/31/2019 12 9  11 5 - 15 4 g/dL Final    Hematocrit 08/31/2019 41 5  34 8 - 46 1 % Final    MCV 08/31/2019 91  82 - 98 fL Final    MCH 08/31/2019 28 4  26 8 - 34 3 pg Final    MCHC 08/31/2019 31 1* 31 4 - 37 4 g/dL Final    RDW 08/31/2019 14 5  11 6 - 15 1 % Final    MPV 08/31/2019 9 4  8 9 - 12 7 fL Final    Platelets 80/62/5591 429* 149 - 390 Thousands/uL Final    nRBC 08/31/2019 0  /100 WBCs Final    Neutrophils Relative 08/31/2019 52  43 - 75 % Final    Immat GRANS % 08/31/2019 0  0 - 2 % Final    Lymphocytes Relative 08/31/2019 37  14 - 44 % Final    Monocytes Relative 08/31/2019 8  4 - 12 % Final    Eosinophils Relative 08/31/2019 2  0 - 6 % Final    Basophils Relative 08/31/2019 1  0 - 1 % Final    Neutrophils Absolute 08/31/2019 4 95  1 85 - 7 62 Thousands/µL Final    Immature Grans Absolute 08/31/2019 0 04  0 00 - 0 20 Thousand/uL Final    Lymphocytes Absolute 08/31/2019 3 50  0 60 - 4 47 Thousands/µL Final    Monocytes Absolute 08/31/2019 0 74  0 17 - 1 22 Thousand/µL Final    Eosinophils Absolute 08/31/2019 0 15  0 00 - 0 61 Thousand/µL Final    Basophils Absolute 08/31/2019 0 09  0 00 - 0 10 Thousands/µL Final    Sodium 08/31/2019 138  136 - 145 mmol/L Final    Potassium 08/31/2019 3 6  3 5 - 5 3 mmol/L Final    Chloride 08/31/2019 102  100 - 108 mmol/L Final    CO2 08/31/2019 31  21 - 32 mmol/L Final    ANION GAP 08/31/2019 5  4 - 13 mmol/L Final    BUN 08/31/2019 20  5 - 25 mg/dL Final    Creatinine 08/31/2019 0 84  0 60 - 1 30 mg/dL Final    Standardized to IDMS reference method    Glucose, Fasting 08/31/2019 68  65 - 99 mg/dL Final      Specimen collection should occur prior to Sulfasalazine administration due to the potential for falsely depressed results  Specimen collection should occur prior to Sulfapyridine administration due to the potential for falsely elevated results      Calcium 08/31/2019 9 1  8 3 - 10 1 mg/dL Final    AST 08/31/2019 11  5 - 45 U/L Final      Specimen collection should occur prior to Sulfasalazine administration due to the potential for falsely depressed results   ALT 08/31/2019 24  12 - 78 U/L Final      Specimen collection should occur prior to Sulfasalazine and/or Sulfapyridine administration due to the potential for falsely depressed results   Alkaline Phosphatase 08/31/2019 70  46 - 116 U/L Final    Total Protein 08/31/2019 7 0  6 4 - 8 2 g/dL Final    Albumin 08/31/2019 3 2* 3 5 - 5 0 g/dL Final    Total Bilirubin 08/31/2019 0 36  0 20 - 1 00 mg/dL Final    eGFR 08/31/2019 79  ml/min/1 73sq m Final    Sed Rate 08/31/2019 25* 0 - 20 mm/hour Final    CRP 08/31/2019 14 3* <3 0 mg/L Final    CORNEL 08/31/2019 Negative  Negative Final    Rheumatoid Factor 08/31/2019 Negative  Negative Final    Vit D, 25-Hydroxy 08/31/2019 13 8* 30 0 - 100 0 ng/mL Final    TSH 3RD GENERATON 08/31/2019 2 340  0 358 - 3 740 uIU/mL Final      The recommended reference ranges for TSH during pregnancy are as follows:   First trimester 0 1 to 2 5 uIU/mL   Second trimester  0 2 to 3 0 uIU/mL   Third trimester 0 3 to 3 0 uIU/m    Note: Normal ranges may not apply to patients who are transgender, non-binary, or whose legal sex, sex at birth, and gender identity differ  Using supplements with high doses of biotin 20 to more than 300 times greater than the adequate daily intake for adults of 30 mcg/day as established by the [place] [employer], can cause falsely depress results      Hepatitis C Ab 08/31/2019 Non-reactive  Non-reactive Final

## 2019-12-10 NOTE — PATIENT INSTRUCTIONS
Take prednisone 30mg for a week, then 20mg for a week, then 15mg for a week, then 10mg for a week, then go down by 1mg every two weeks  Continue ergocalciferol 50,000 units weekly  Do bloodwork before next visit    Return to clinic on 1/28/19

## 2019-12-20 ENCOUNTER — OFFICE VISIT (OUTPATIENT)
Dept: SLEEP CENTER | Facility: CLINIC | Age: 54
End: 2019-12-20
Payer: COMMERCIAL

## 2019-12-20 VITALS
SYSTOLIC BLOOD PRESSURE: 106 MMHG | HEIGHT: 61 IN | WEIGHT: 212 LBS | DIASTOLIC BLOOD PRESSURE: 60 MMHG | BODY MASS INDEX: 40.02 KG/M2

## 2019-12-20 DIAGNOSIS — G47.09 OTHER INSOMNIA: ICD-10-CM

## 2019-12-20 DIAGNOSIS — F41.9 ANXIETY AND DEPRESSION: ICD-10-CM

## 2019-12-20 DIAGNOSIS — Z72.0 TOBACCO ABUSE: ICD-10-CM

## 2019-12-20 DIAGNOSIS — I10 ESSENTIAL HYPERTENSION: ICD-10-CM

## 2019-12-20 DIAGNOSIS — M06.00 RHEUMATOID ARTHRITIS WITH NEGATIVE RHEUMATOID FACTOR, INVOLVING UNSPECIFIED SITE (HCC): ICD-10-CM

## 2019-12-20 DIAGNOSIS — G47.33 OBSTRUCTIVE SLEEP APNEA SYNDROME: Primary | ICD-10-CM

## 2019-12-20 DIAGNOSIS — J45.30 MILD PERSISTENT ASTHMA WITHOUT COMPLICATION: ICD-10-CM

## 2019-12-20 DIAGNOSIS — E66.9 OBESITY (BMI 30-39.9): ICD-10-CM

## 2019-12-20 DIAGNOSIS — F32.A ANXIETY AND DEPRESSION: ICD-10-CM

## 2019-12-20 DIAGNOSIS — Z91.09 ENVIRONMENTAL ALLERGIES: ICD-10-CM

## 2019-12-20 DIAGNOSIS — R53.83 FATIGUE, UNSPECIFIED TYPE: ICD-10-CM

## 2019-12-20 PROCEDURE — 99214 OFFICE O/P EST MOD 30 MIN: CPT | Performed by: INTERNAL MEDICINE

## 2019-12-20 NOTE — PATIENT INSTRUCTIONS

## 2019-12-20 NOTE — PROGRESS NOTES
Follow-Up Note - [address] [place] [address] Drive  46 y o  female  :1965  VJR:[id number]    CC: I saw this patient for follow-up in clinic today for her Sleep Disordered Breathing, Coexisting Sleep and Medical Problems  Patient had a split sleep study and is here to review results and to initiate therapy  The diagnostic portion demonstrated  : AHI of 25 per hour, considerably higher while supine and during stage REM  Intermittent snoring was noted  Minimum oxygen saturation was 87 % and she spent 8 2 minutes of this portion of the study with saturations less than 90%  During the therapeutic portion of the study, his sleep disordered breathing was adequately remediated with nasal CPAP at 6 cm H2O  Sleep efficiency improved from 41-84% of the initiating therapy  PFSH, Problem List, Medications & Allergies were reviewed in EMR  Interval changes: none reported  She  has a past medical history of Abdominal hernia, Anxiety, Arthritis, Asthma, Brain injury ([facility] [address] [facility] ) (), COPD (chronic obstructive pulmonary disease) ([facility] ), CPAP (continuous positive airway pressure) dependence, Depression, Fatty liver, GERD (gastroesophageal reflux disease), History of gestational diabetes, History of laparoscopic adjustable gastric banding, History of recent fall, Hypertension, Knee pain, right, Obese, Pneumonia, RA (rheumatoid arthritis) ([facility] ), Risk for falls, Shortness of breath, Shoulder pain, bilateral, Sleep apnea, and Vomiting  She has a current medication list which includes the following prescription(s): acetaminophen, albuterol, albuterol, budesonide-formoterol, bupropion, clotrimazole-betamethasone, diphenhydramine, ergocalciferol, fluticasone, hydroxyzine hcl, levalbuterol, montelukast, mupirocin, prednisone, prednisone, spiriva respimat, spironolactone, terbinafine, triamcinolone, and valsartan-hydrochlorothiazide  ROS: Reviewed (see attached)  Significant for had a reaction to you my read and required high-dose prednisone  She has currently been weaned off the medication  She has ongoing feelings of anxiety and depression for which she is on Wellbutrin  DATA REVIEWED:  using PAP > 4 hours/night 97% of the time  Estimated DWIGHT 6/hour at pressure of 6cm H2O     SUBJECTIVE: Regarding use of PAP, Elham reports:   · She is experiencing some adverse effects: dry mouth and dry nose  · She is   benefiting from use: no longer snoring / having breathing difficulties   Sleep Routine: She reports getting 4 hrs sleep but is unsure how much time she spending in bed; she has difficulty initiating and maintaining sleep   She has psychosocial stress due to worry about her son who has autism, wakes up at night and she has to attend to him  She awakens spontaneously and is not always refreshed  She denied excessive drowsiness or napping  She rated herself at Total score: 0 /24 on the [place] sleepiness scale  Habits: reports that she has been smoking cigarettes  She has a 0 45 pack-year smoking history  She uses smokeless tobacco ,  reports that she drinks alcohol ,  reports that she does not use drugs  , Caffeine use: limited , Exercise routine: regular    OBJECTIVE: /60   Ht 5' 1" (1 549 m)   Wt 96 2 kg (212 lb)   BMI 40 06 kg/m²    Constitutional: Patient is well groomed; well appearing  Skin/Extrem: warm & dry; col & hydration normal; no edema  Psych: cooperativeand in no distress  Anxious, Depressed mood  CNS: Alert, orientated, clear & coherent speech  H&N: EOMI; NC/AT:no facial pressure marks, no rashes      ENMT Mucus membranes normal Nasal airway:patent  Oral airway: crowded  Resp:effort is normal CVS: RRR ABD:truncal obesity MSK:Gait normal     ASSESSMENT: Primary Sleep/Secondary(to Medical or Psych conditions) & comorbidities   1  Obstructive sleep apnea syndrome     2  Other insomnia     3  Environmental allergies     4  Mild persistent asthma without complication     5   Tobacco abuse     6  Anxiety and depression     7  Obesity (BMI 30-39 9)     8  Essential hypertension     9  Rheumatoid arthritis with negative rheumatoid factor, involving unspecified site ([facility] )       PLAN:  1  I reviewed results of the Sleep studies with the patient  2  Treatment with  PAP is medically necessary and Lendon Cleverly is agreable to continue use  3  Care of equipment, methods to improve comfort using PAP and importance of compliance with therapy were discussed  4  Pressure setting: continue 6 cmH2O     5  Rx provided to replace supplies and Care coordinated with DME provider  6  Cognitive behavioral therapy was initiated, Sleep Hygiene and behavioral techniques to manage Insomnia were discussed  I anticipate improvement in his sleep once she is off prednisone  However, she may need reviewing of her psychiatric medication  7  Strategies for weight reduction were discussed  8  I also advised smoking cessation  9  Follow-up is advised in 1 year or sooner if needed to monitor progress, compliance and to adjust therapy  Thank you for allowing me to participate in the care of this patient      Sincerely,    Authenticated electronically by Ana Garcia MD on 87/76/37   Board Certified Specialist

## 2019-12-20 NOTE — PROGRESS NOTES
Review of Systems      Genitourinary none   Cardiology none   Gastrointestinal none   Neurology none   Constitutional fatigue   Integumentary rash or dry skin   Psychiatry anxiety and depression   Musculoskeletal none   Pulmonary none   ENT none   Endocrine none   Hematological none

## 2019-12-23 ENCOUNTER — TELEPHONE (OUTPATIENT)
Dept: SLEEP CENTER | Facility: CLINIC | Age: 54
End: 2019-12-23

## 2020-01-22 ENCOUNTER — TELEPHONE (OUTPATIENT)
Dept: DERMATOLOGY | Facility: CLINIC | Age: 55
End: 2020-01-22

## 2020-01-22 NOTE — TELEPHONE ENCOUNTER
Called patient from our recall list to schedule a follow up, left voice mail to call us back to schedule an appointment

## 2020-01-24 ENCOUNTER — APPOINTMENT (OUTPATIENT)
Dept: LAB | Facility: MEDICAL CENTER | Age: 55
End: 2020-01-24
Payer: COMMERCIAL

## 2020-01-24 DIAGNOSIS — Z79.899 ENCOUNTER FOR LONG-TERM (CURRENT) USE OF OTHER MEDICATIONS: Primary | ICD-10-CM

## 2020-01-24 LAB
25(OH)D3 SERPL-MCNC: 29.8 NG/ML (ref 30–100)
ALBUMIN SERPL BCP-MCNC: 3.9 G/DL (ref 3.5–5)
ALP SERPL-CCNC: 77 U/L (ref 46–116)
ALT SERPL W P-5'-P-CCNC: 31 U/L (ref 12–78)
ANION GAP SERPL CALCULATED.3IONS-SCNC: 7 MMOL/L (ref 4–13)
AST SERPL W P-5'-P-CCNC: 14 U/L (ref 5–45)
BASOPHILS # BLD AUTO: 0.09 THOUSANDS/ΜL (ref 0–0.1)
BASOPHILS NFR BLD AUTO: 1 % (ref 0–1)
BILIRUB SERPL-MCNC: 0.38 MG/DL (ref 0.2–1)
BUN SERPL-MCNC: 26 MG/DL (ref 5–25)
CALCIUM SERPL-MCNC: 9.5 MG/DL (ref 8.3–10.1)
CHLORIDE SERPL-SCNC: 101 MMOL/L (ref 100–108)
CK SERPL-CCNC: 127 U/L (ref 26–192)
CO2 SERPL-SCNC: 30 MMOL/L (ref 21–32)
CREAT SERPL-MCNC: 1.06 MG/DL (ref 0.6–1.3)
CRP SERPL QL: 4.2 MG/L
EOSINOPHIL # BLD AUTO: 0.08 THOUSAND/ΜL (ref 0–0.61)
EOSINOPHIL NFR BLD AUTO: 1 % (ref 0–6)
ERYTHROCYTE [DISTWIDTH] IN BLOOD BY AUTOMATED COUNT: 14.3 % (ref 11.6–15.1)
ERYTHROCYTE [SEDIMENTATION RATE] IN BLOOD: 23 MM/HOUR (ref 0–20)
GFR SERPL CREATININE-BSD FRML MDRD: 59 ML/MIN/1.73SQ M
GLUCOSE P FAST SERPL-MCNC: 85 MG/DL (ref 65–99)
HAV IGM SER QL: NORMAL
HBV CORE IGM SER QL: NORMAL
HBV SURFACE AG SER QL: NORMAL
HCT VFR BLD AUTO: 42.6 % (ref 34.8–46.1)
HCV AB SER QL: NORMAL
HGB BLD-MCNC: 13.8 G/DL (ref 11.5–15.4)
IMM GRANULOCYTES # BLD AUTO: 0.06 THOUSAND/UL (ref 0–0.2)
IMM GRANULOCYTES NFR BLD AUTO: 1 % (ref 0–2)
LYMPHOCYTES # BLD AUTO: 1.53 THOUSANDS/ΜL (ref 0.6–4.47)
LYMPHOCYTES NFR BLD AUTO: 14 % (ref 14–44)
MCH RBC QN AUTO: 30.2 PG (ref 26.8–34.3)
MCHC RBC AUTO-ENTMCNC: 32.4 G/DL (ref 31.4–37.4)
MCV RBC AUTO: 93 FL (ref 82–98)
MONOCYTES # BLD AUTO: 0.79 THOUSAND/ΜL (ref 0.17–1.22)
MONOCYTES NFR BLD AUTO: 7 % (ref 4–12)
NEUTROPHILS # BLD AUTO: 8.5 THOUSANDS/ΜL (ref 1.85–7.62)
NEUTS SEG NFR BLD AUTO: 76 % (ref 43–75)
NRBC BLD AUTO-RTO: 0 /100 WBCS
PLATELET # BLD AUTO: 438 THOUSANDS/UL (ref 149–390)
PMV BLD AUTO: 9.1 FL (ref 8.9–12.7)
POTASSIUM SERPL-SCNC: 3.6 MMOL/L (ref 3.5–5.3)
PROT SERPL-MCNC: 7.7 G/DL (ref 6.4–8.2)
RBC # BLD AUTO: 4.57 MILLION/UL (ref 3.81–5.12)
SODIUM SERPL-SCNC: 138 MMOL/L (ref 136–145)
WBC # BLD AUTO: 11.05 THOUSAND/UL (ref 4.31–10.16)

## 2020-01-24 PROCEDURE — 82550 ASSAY OF CK (CPK): CPT | Performed by: INTERNAL MEDICINE

## 2020-01-24 PROCEDURE — 36415 COLL VENOUS BLD VENIPUNCTURE: CPT | Performed by: INTERNAL MEDICINE

## 2020-01-24 PROCEDURE — 85025 COMPLETE CBC W/AUTO DIFF WBC: CPT | Performed by: INTERNAL MEDICINE

## 2020-01-24 PROCEDURE — 86255 FLUORESCENT ANTIBODY SCREEN: CPT | Performed by: INTERNAL MEDICINE

## 2020-01-24 PROCEDURE — 82306 VITAMIN D 25 HYDROXY: CPT | Performed by: INTERNAL MEDICINE

## 2020-01-24 PROCEDURE — 85652 RBC SED RATE AUTOMATED: CPT | Performed by: INTERNAL MEDICINE

## 2020-01-24 PROCEDURE — 86140 C-REACTIVE PROTEIN: CPT | Performed by: INTERNAL MEDICINE

## 2020-01-24 PROCEDURE — 82085 ASSAY OF ALDOLASE: CPT | Performed by: INTERNAL MEDICINE

## 2020-01-24 PROCEDURE — 80053 COMPREHEN METABOLIC PANEL: CPT | Performed by: INTERNAL MEDICINE

## 2020-01-24 PROCEDURE — 80074 ACUTE HEPATITIS PANEL: CPT

## 2020-01-27 LAB — ALDOLASE SERPL-CCNC: 4.7 U/L (ref 3.3–10.3)

## 2020-01-28 LAB
C-ANCA TITR SER IF: ABNORMAL TITER
MYELOPEROXIDASE AB SER IA-ACNC: <9 U/ML (ref 0–9)
P-ANCA ATYPICAL TITR SER IF: ABNORMAL TITER
P-ANCA TITR SER IF: ABNORMAL TITER
PROTEINASE3 AB SER IA-ACNC: <3.5 U/ML (ref 0–3.5)

## 2020-03-06 DIAGNOSIS — J45.30 MILD PERSISTENT ASTHMA WITHOUT COMPLICATION: ICD-10-CM

## 2020-03-06 RX ORDER — ALBUTEROL SULFATE 2.5 MG/3ML
2.5 SOLUTION RESPIRATORY (INHALATION) EVERY 6 HOURS PRN
Qty: 150 ML | Refills: 3 | Status: SHIPPED | OUTPATIENT
Start: 2020-03-06

## 2020-03-06 RX ORDER — LEVALBUTEROL TARTRATE 45 UG/1
1-2 AEROSOL, METERED ORAL EVERY 4 HOURS PRN
Qty: 1 INHALER | Refills: 5 | Status: SHIPPED | OUTPATIENT
Start: 2020-03-06

## 2020-03-13 DIAGNOSIS — M06.00 SERONEGATIVE EROSIVE RHEUMATOID ARTHRITIS (HCC): ICD-10-CM

## 2020-03-13 DIAGNOSIS — J45.30 MILD PERSISTENT ASTHMA WITHOUT COMPLICATION: ICD-10-CM

## 2020-03-13 DIAGNOSIS — I10 ESSENTIAL HYPERTENSION: ICD-10-CM

## 2020-03-13 RX ORDER — VALSARTAN AND HYDROCHLOROTHIAZIDE 160; 25 MG/1; MG/1
1 TABLET ORAL DAILY
Qty: 90 TABLET | Refills: 1 | Status: SHIPPED | OUTPATIENT
Start: 2020-03-13

## 2020-03-13 RX ORDER — ALBUTEROL SULFATE 90 UG/1
2 AEROSOL, METERED RESPIRATORY (INHALATION) 4 TIMES DAILY
Qty: 3 INHALER | Refills: 1 | Status: SHIPPED | OUTPATIENT
Start: 2020-03-13

## 2020-03-13 RX ORDER — MONTELUKAST SODIUM 10 MG/1
10 TABLET ORAL
Qty: 90 TABLET | Refills: 1 | Status: SHIPPED | OUTPATIENT
Start: 2020-03-13 | End: 2020-09-17

## 2020-03-13 RX ORDER — PREDNISONE 1 MG/1
TABLET ORAL
Qty: 120 TABLET | Refills: 3 | Status: SHIPPED | OUTPATIENT
Start: 2020-03-13 | End: 2020-08-17

## 2020-03-16 DIAGNOSIS — M06.00 SERONEGATIVE EROSIVE RHEUMATOID ARTHRITIS (HCC): ICD-10-CM

## 2020-03-16 RX ORDER — HYDROXYCHLOROQUINE SULFATE 200 MG/1
TABLET, FILM COATED ORAL
Qty: 60 TABLET | Refills: 3 | OUTPATIENT
Start: 2020-03-16

## 2020-03-26 ENCOUNTER — TELEPHONE (OUTPATIENT)
Dept: DERMATOLOGY | Facility: CLINIC | Age: 55
End: 2020-03-26

## 2020-03-27 DIAGNOSIS — Z79.52 CURRENT CHRONIC USE OF SYSTEMIC STEROIDS: ICD-10-CM

## 2020-03-27 DIAGNOSIS — M06.00 SERONEGATIVE EROSIVE RHEUMATOID ARTHRITIS (HCC): ICD-10-CM

## 2020-03-27 RX ORDER — ERGOCALCIFEROL 1.25 MG/1
CAPSULE ORAL
Qty: 4 CAPSULE | Refills: 2 | OUTPATIENT
Start: 2020-03-27

## 2020-03-30 ENCOUNTER — TELEPHONE (OUTPATIENT)
Dept: OBGYN CLINIC | Facility: CLINIC | Age: 55
End: 2020-03-30

## 2020-03-30 DIAGNOSIS — M06.00 RHEUMATOID ARTHRITIS WITH NEGATIVE RHEUMATOID FACTOR, INVOLVING UNSPECIFIED SITE (HCC): Primary | ICD-10-CM

## 2020-03-30 DIAGNOSIS — M06.00 RHEUMATOID ARTHRITIS WITH NEGATIVE RHEUMATOID FACTOR, INVOLVING UNSPECIFIED SITE (HCC): ICD-10-CM

## 2020-03-30 RX ORDER — PREDNISONE 10 MG/1
10 TABLET ORAL DAILY
Qty: 90 TABLET | Refills: 1 | Status: SHIPPED | OUTPATIENT
Start: 2020-03-30 | End: 2020-08-06 | Stop reason: ALTCHOICE

## 2020-03-30 RX ORDER — PREDNISONE 10 MG/1
10 TABLET ORAL DAILY
Qty: 30 TABLET | Refills: 3 | Status: SHIPPED | OUTPATIENT
Start: 2020-03-30 | End: 2020-03-30 | Stop reason: SDUPTHER

## 2020-03-30 NOTE — TELEPHONE ENCOUNTER
Patient called requesting a refill of her prednisone 10 mg  She was trying to wean off the prednisone, but had to start taking the 10s again  Patient states due to the pandemic she had her pcp call her other medications in for a 90 day supply she was questioning if it was possible to do this with her prednisone       Patient also stated that she had lab work done back in January

## 2020-03-31 NOTE — TELEPHONE ENCOUNTER
Please let patient know that I sent a 90-days supply of the 10mg prednisone tabs to [facility]  Also, please let her know that her bloodwork in 1/2020 looked good

## 2020-04-21 DIAGNOSIS — M06.00 SERONEGATIVE EROSIVE RHEUMATOID ARTHRITIS (HCC): ICD-10-CM

## 2020-04-22 RX ORDER — HYDROXYCHLOROQUINE SULFATE 200 MG/1
TABLET, FILM COATED ORAL
Qty: 60 TABLET | Refills: 3 | OUTPATIENT
Start: 2020-04-22

## 2020-05-13 DIAGNOSIS — L27.0 DRUG ERUPTION: ICD-10-CM

## 2020-05-14 ENCOUNTER — TELEPHONE (OUTPATIENT)
Dept: DERMATOLOGY | Facility: CLINIC | Age: 55
End: 2020-05-14

## 2020-05-18 ENCOUNTER — TELEPHONE (OUTPATIENT)
Dept: DERMATOLOGY | Facility: CLINIC | Age: 55
End: 2020-05-18

## 2020-05-18 ENCOUNTER — TELEPHONE (OUTPATIENT)
Dept: RHEUMATOLOGY | Facility: CLINIC | Age: 55
End: 2020-05-18

## 2020-05-18 ENCOUNTER — TELEMEDICINE (OUTPATIENT)
Dept: RHEUMATOLOGY | Facility: CLINIC | Age: 55
End: 2020-05-18
Payer: COMMERCIAL

## 2020-05-18 DIAGNOSIS — Z79.899 HIGH RISK MEDICATION USE: ICD-10-CM

## 2020-05-18 DIAGNOSIS — M06.00 RHEUMATOID ARTHRITIS WITH NEGATIVE RHEUMATOID FACTOR, INVOLVING UNSPECIFIED SITE (HCC): Primary | ICD-10-CM

## 2020-05-18 DIAGNOSIS — R79.89 LOW VITAMIN D LEVEL: ICD-10-CM

## 2020-05-18 PROCEDURE — G2012 BRIEF CHECK IN BY MD/QHP: HCPCS | Performed by: INTERNAL MEDICINE

## 2020-05-28 ENCOUNTER — APPOINTMENT (OUTPATIENT)
Dept: LAB | Facility: MEDICAL CENTER | Age: 55
End: 2020-05-28
Payer: COMMERCIAL

## 2020-05-28 LAB
25(OH)D3 SERPL-MCNC: 36.6 NG/ML (ref 30–100)
ALBUMIN SERPL BCP-MCNC: 3.7 G/DL (ref 3.5–5)
ALP SERPL-CCNC: 77 U/L (ref 46–116)
ALT SERPL W P-5'-P-CCNC: 37 U/L (ref 12–78)
ANION GAP SERPL CALCULATED.3IONS-SCNC: 6 MMOL/L (ref 4–13)
AST SERPL W P-5'-P-CCNC: 18 U/L (ref 5–45)
BASOPHILS # BLD AUTO: 0.05 THOUSANDS/ΜL (ref 0–0.1)
BASOPHILS NFR BLD AUTO: 1 % (ref 0–1)
BILIRUB SERPL-MCNC: 0.37 MG/DL (ref 0.2–1)
BUN SERPL-MCNC: 12 MG/DL (ref 5–25)
CALCIUM SERPL-MCNC: 9.2 MG/DL (ref 8.3–10.1)
CHLORIDE SERPL-SCNC: 102 MMOL/L (ref 100–108)
CO2 SERPL-SCNC: 29 MMOL/L (ref 21–32)
CREAT SERPL-MCNC: 0.86 MG/DL (ref 0.6–1.3)
CRP SERPL QL: 7.4 MG/L
EOSINOPHIL # BLD AUTO: 0.01 THOUSAND/ΜL (ref 0–0.61)
EOSINOPHIL NFR BLD AUTO: 0 % (ref 0–6)
ERYTHROCYTE [DISTWIDTH] IN BLOOD BY AUTOMATED COUNT: 13.6 % (ref 11.6–15.1)
ERYTHROCYTE [SEDIMENTATION RATE] IN BLOOD: 53 MM/HOUR (ref 0–20)
GFR SERPL CREATININE-BSD FRML MDRD: 76 ML/MIN/1.73SQ M
GLUCOSE P FAST SERPL-MCNC: 103 MG/DL (ref 65–99)
HCT VFR BLD AUTO: 43.5 % (ref 34.8–46.1)
HGB BLD-MCNC: 13.8 G/DL (ref 11.5–15.4)
IMM GRANULOCYTES # BLD AUTO: 0.04 THOUSAND/UL (ref 0–0.2)
IMM GRANULOCYTES NFR BLD AUTO: 0 % (ref 0–2)
LYMPHOCYTES # BLD AUTO: 1.12 THOUSANDS/ΜL (ref 0.6–4.47)
LYMPHOCYTES NFR BLD AUTO: 12 % (ref 14–44)
MCH RBC QN AUTO: 29.6 PG (ref 26.8–34.3)
MCHC RBC AUTO-ENTMCNC: 31.7 G/DL (ref 31.4–37.4)
MCV RBC AUTO: 93 FL (ref 82–98)
MONOCYTES # BLD AUTO: 0.42 THOUSAND/ΜL (ref 0.17–1.22)
MONOCYTES NFR BLD AUTO: 4 % (ref 4–12)
NEUTROPHILS # BLD AUTO: 8.12 THOUSANDS/ΜL (ref 1.85–7.62)
NEUTS SEG NFR BLD AUTO: 83 % (ref 43–75)
NRBC BLD AUTO-RTO: 0 /100 WBCS
PLATELET # BLD AUTO: 409 THOUSANDS/UL (ref 149–390)
PMV BLD AUTO: 9.7 FL (ref 8.9–12.7)
POTASSIUM SERPL-SCNC: 3.6 MMOL/L (ref 3.5–5.3)
PROT SERPL-MCNC: 7.7 G/DL (ref 6.4–8.2)
RBC # BLD AUTO: 4.66 MILLION/UL (ref 3.81–5.12)
SODIUM SERPL-SCNC: 137 MMOL/L (ref 136–145)
WBC # BLD AUTO: 9.76 THOUSAND/UL (ref 4.31–10.16)

## 2020-05-28 PROCEDURE — 36415 COLL VENOUS BLD VENIPUNCTURE: CPT | Performed by: INTERNAL MEDICINE

## 2020-05-28 PROCEDURE — 80053 COMPREHEN METABOLIC PANEL: CPT | Performed by: INTERNAL MEDICINE

## 2020-05-28 PROCEDURE — 82306 VITAMIN D 25 HYDROXY: CPT | Performed by: INTERNAL MEDICINE

## 2020-05-28 PROCEDURE — 85652 RBC SED RATE AUTOMATED: CPT | Performed by: INTERNAL MEDICINE

## 2020-05-28 PROCEDURE — 86140 C-REACTIVE PROTEIN: CPT | Performed by: INTERNAL MEDICINE

## 2020-05-28 PROCEDURE — 85025 COMPLETE CBC W/AUTO DIFF WBC: CPT | Performed by: INTERNAL MEDICINE

## 2020-05-29 DIAGNOSIS — M06.00 RHEUMATOID ARTHRITIS WITH NEGATIVE RHEUMATOID FACTOR, INVOLVING UNSPECIFIED SITE (HCC): Primary | ICD-10-CM

## 2020-05-29 RX ORDER — LEFLUNOMIDE 10 MG/1
10 TABLET ORAL DAILY
Qty: 30 TABLET | Refills: 3 | Status: SHIPPED | OUTPATIENT
Start: 2020-05-29 | End: 2020-08-06 | Stop reason: ALTCHOICE

## 2020-08-06 ENCOUNTER — TELEPHONE (OUTPATIENT)
Dept: OBGYN CLINIC | Facility: HOSPITAL | Age: 55
End: 2020-08-06

## 2020-08-06 ENCOUNTER — OFFICE VISIT (OUTPATIENT)
Dept: RHEUMATOLOGY | Facility: CLINIC | Age: 55
End: 2020-08-06
Payer: COMMERCIAL

## 2020-08-06 VITALS
HEART RATE: 93 BPM | WEIGHT: 212 LBS | TEMPERATURE: 97.8 F | BODY MASS INDEX: 40.02 KG/M2 | DIASTOLIC BLOOD PRESSURE: 82 MMHG | SYSTOLIC BLOOD PRESSURE: 137 MMHG | HEIGHT: 61 IN | RESPIRATION RATE: 18 BRPM

## 2020-08-06 DIAGNOSIS — M06.00 SERONEGATIVE EROSIVE RHEUMATOID ARTHRITIS (HCC): Primary | ICD-10-CM

## 2020-08-06 DIAGNOSIS — Z79.899 HIGH RISK MEDICATION USE: ICD-10-CM

## 2020-08-06 DIAGNOSIS — M17.0 PRIMARY OSTEOARTHRITIS OF BOTH KNEES: ICD-10-CM

## 2020-08-06 PROCEDURE — 99215 OFFICE O/P EST HI 40 MIN: CPT | Performed by: INTERNAL MEDICINE

## 2020-08-06 RX ORDER — HYDROXYCHLOROQUINE SULFATE 200 MG/1
200 TABLET, FILM COATED ORAL 2 TIMES DAILY
Qty: 180 TABLET | Refills: 1 | Status: SHIPPED | OUTPATIENT
Start: 2020-08-06 | End: 2020-12-19

## 2020-08-06 RX ORDER — IBUPROFEN 800 MG/1
800 TABLET ORAL EVERY 8 HOURS PRN
Qty: 90 TABLET | Refills: 3 | Status: SHIPPED | OUTPATIENT
Start: 2020-08-06 | End: 2020-12-22

## 2020-08-06 NOTE — TELEPHONE ENCOUNTER
Pt  Called stating someone was supposed to call her back today on a shower chair   Pt  Stated the person in the office who takes care of this went to lunch when pt  Was checking out today  Please contact pt  Thanks

## 2020-08-06 NOTE — PROGRESS NOTES
Assessment and Plan: Wanda Richards is a 53 y o  female who presents for follow-up of her seronegative RA  Her right shoulder has been bothering her as well as her right knee, in which she has OA  She can restart taking HCQ 200mg po bid, since she didn't actually have a reaction to it in the past, rather it was Humira that she developed a severe rash to  She can use diclofenac gel or ibuprofen for knee pain as needed (asked her to not use them at the same time)  She is to continue 2,000 units a day of Vit  D for low Vit  D  Also, she is to contact Orthopedics regarding possible right knee replacement  Plan:  Diagnoses and all orders for this visit:    Seronegative erosive rheumatoid arthritis ([address] )  -     hydroxychloroquine (PLAQUENIL) 200 mg tablet; Take 1 tablet (200 mg total) by mouth 2 (two) times a day    Primary osteoarthritis of both knees  -     diclofenac sodium (VOLTAREN) 1 %; Apply 4 g topically 4 (four) times a day as needed (pain)  -     ibuprofen (MOTRIN) 800 mg tablet; Take 1 tablet (800 mg total) by mouth every 8 (eight) hours as needed for mild pain or moderate pain    High risk medication use - Benefits and risks of hydroxychloroquine, including but not limited to retinal toxicity, corneal deposits, gastrointestinal side effects, and headaches were discussed with the patient  The need for a regular eye exam to monitor for ocular toxicity was also explained to the patient  Follow-up plan: Return to clinic in 3 months       Rheumatic Disease Summary:  [place] a 53 y  o  female who originally presented on 9/16/19 as a Rheumatology consult referred by her Domenico Ann MD for evaluation and management of her seronegative Rheumatoid arthritis  Patient had a DAS28 score of 3 32, consistent with moderate disease activity  She was having active inflammatory arthritis despite being on prednisone 10mg po daily, which she cannot stay on long-term without being tapered   Patient had already been tried on methotrexate and sulfasalazine DMARD therapy, both of which caused patient to develop a rash  It was deemed appropriate to pursue treatment with a biologic medication such as Humira to better control her disease, though this took several weeks to get approved  Vit  D level was low, which was being supplemented  DEXA scan returned normal  Prescribed diclofenac gel to apply to painful knees and shoulders as needed for both her RA and OA  Ordered bilateral hand and feet x-rays to obtain patient's latest baseline, which revealed an erosion at the 3rd DIP joint on the right hand, which is an atypical location for RA  Since patient had a history of miscarriage, ordered an antiphospholipid panel, which returned unremarkable       She presented for follow-up on 10/29/19, at which time she had not yet started on Humira, and her RA symptoms were overall stable  She was encouraged to go ahead and start Humira while starting to taper her prednisone by 1mg every 2 weeks  Also had started patient on the DMARD hydroxychloroquine 200mg po bid to work in conjunction with Humira to control her RA symptoms  She was to continue her ergocalciferol 50,000 units po weekly for her Vit  D deficiency until her Vit  D level was repeated  She was to continue to apply diclofenac gel as needed for her knee osteoarthritis-related pain       She then presented for follow-up on 12/10/19 as an urgent clinic visit after being managed at the [facility] ED for an extensive rash that she developed as an allergic reaction to Humira, which had significantly improved upon the medication's discontinuation and increased prednisone course  Patient's ANCA and MD-3 were slightly positive 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  Besides rash, which had resolved, patient had no signs or symptoms of a vasculitis, such as kidney or lung involvement   Had decided to hold off initiating any further DMARD or biologic therapy for patient's seronegative RA  Asked patient to increase her prednisone to 30mg po daily for a week, then decrease to 20mg po daily for a week, then 15mg po daily for a week, then 10mg po daily for a week, then go down by 1mg every two weeks as tolerated  She was to continue ergocalciferol 50,000 units po weekly for her Vit  D deficiency  Ordered repeat ANCA and the muscle enzymes CK and aldolase; muscle enzymes returned normal, and her atypical pANCA returned slightly positive at 1:80, though her regular C- and P-ANCA returned negative, as well as MPO and OR-3 returned negative, making it more likely that the patient 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  She next presented for follow-up on 5/18/20 via telemedicine for her seronegative Rheumatoid arthritis  She had a rash to methotrexate, sulfasalazine, and Humira so far  Was considering starting patient on leflunomide 10mg po daily for DMARD therapy, so that her prednisone could be tapered down in the future  Ordered baseline CBC, CMP prior to starting per patient's request, and ESR, CRP for disease activity monitoring  She was to continue prednisone 10mg po daily for time-being, and diclofenac gel as needed for joint pain  HPI   Jer Baker is a 53 y o   female who presents for follow-up of her seronegative RA  Last clinic visit was 5/18/20 via telemedicine  She admits to taking leflunomide 10mg po daily for a month, then stopped since she did not notice much difference on it  Has been taking 2,000 units of Vit  D daily  Admits to right knee tear  Also admits to taking ibuprofen 800mg 3-4 times a day  She admits to tingling in her fingers and right shoulder bothering her for the past month      The following portions of the patient's history were reviewed and updated as appropriate: allergies, current medications, past family history, past medical history, past social history, past surgical history and problem list     Review of Systems:   Review of Systems   Constitutional: Negative for fatigue and unexpected weight change  HENT: Negative for mouth sores  Respiratory: Negative for cough and shortness of breath  Gastrointestinal: Negative for constipation and diarrhea  Musculoskeletal: Positive for arthralgias  Negative for back pain, joint swelling and myalgias  Skin: Negative for color change and rash  Neurological: Negative for weakness  Psychiatric/Behavioral: Negative for sleep disturbance         Home Medications:     Current Outpatient Medications:     acetaminophen (TYLENOL) 650 mg CR tablet, Take 650 mg by mouth every 12 (twelve) hours, Disp: , Rfl:     albuterol (2 5 mg/3 mL) 0 083 % nebulizer solution, Take 1 vial (2 5 mg total) by nebulization every 6 (six) hours as needed for wheezing, Disp: 150 mL, Rfl: 3    albuterol (PROVENTIL HFA,VENTOLIN HFA) 90 mcg/act inhaler, Inhale 2 puffs 4 (four) times a day As directed, Disp: 3 Inhaler, Rfl: 1    budesonide-formoterol (SYMBICORT) 160-4 5 mcg/act inhaler, Inhale 2 puffs 2 (two) times a day, Disp: , Rfl:     buPROPion (WELLBUTRIN SR) 150 mg 12 hr tablet, 2 am, 1 pm, Disp: 90 tablet, Rfl: 5    fluticasone (FLONASE) 50 mcg/act nasal spray, 1 spray into each nostril as needed  , Disp: , Rfl:     levalbuterol (XOPENEX HFA) 45 mcg/act inhaler, Inhale 1-2 puffs every 4 (four) hours as needed for wheezing, Disp: 1 Inhaler, Rfl: 5    mupirocin (BACTROBAN) 2 % ointment, Apply twice daily to open sores, Disp: 22 g, Rfl: 3    spironolactone (ALDACTONE) 25 mg tablet, Take 25 mg by mouth as needed, Disp: , Rfl:     terbinafine (LAMISIL AT) 1 % cream, Apply topically to feet twice a day, Disp: 60 g, Rfl: 3    triamcinolone (KENALOG) 0 1 % ointment, APPLY TOPICALLY TWICE A DAY FOR 2 WEEKS, Disp: 454 g, Rfl: 0    valsartan-hydrochlorothiazide (DIOVAN-HCT) 160-25 MG per tablet, Take 1 tablet by mouth daily, Disp: 90 tablet, Rfl: 1    clotrimazole-betamethasone (LOTRISONE) 1-0 05 % cream, Apply topically 2 (two) times a day (Patient not taking: Reported on 12/9/2019), Disp: 30 g, Rfl: 0    diclofenac sodium (VOLTAREN) 1 %, Apply 4 g topically 4 (four) times a day as needed (pain), Disp: 300 g, Rfl: 6    diphenhydrAMINE (BENADRYL) 50 mg capsule, Take 50 mg by mouth every 6 (six) hours as needed, Disp: , Rfl:     hydroxychloroquine (PLAQUENIL) 200 mg tablet, TAKE 1 TABLET BY MOUTH TWICE A DAY, Disp: 180 tablet, Rfl: 0    hydrOXYzine HCL (ATARAX) 25 mg tablet, TAKE 1 TABLET (25 MG TOTAL) BY MOUTH EVERY 6 (SIX) HOURS AS NEEDED FOR ITCHING (Patient not taking: Reported on 7/28/2020), Disp: 120 tablet, Rfl: 0    ibuprofen (MOTRIN) 800 mg tablet, TAKE 1 TABLET (800 MG TOTAL) BY MOUTH EVERY 8 (EIGHT) HOURS AS NEEDED FOR MILD PAIN OR MODERATE PAIN, Disp: 90 tablet, Rfl: 0    leflunomide (ARAVA) 10 MG tablet, TAKE 1 TABLET BY MOUTH EVERY DAY, Disp: 30 tablet, Rfl: 0    montelukast (SINGULAIR) 10 mg tablet, TAKE 1 TABLET BY MOUTH DAILY AT BEDTIME, Disp: 90 tablet, Rfl: 0    predniSONE 1 mg tablet, TAKE 3 TABLETS BY MOUTH EVERY DAY, Disp: 90 tablet, Rfl: 0    SPIRIVA RESPIMAT 1 25 MCG/ACT AERS inhaler, INHALE 2 INHALATIONS BY MOUTH DAILY (Patient not taking: Reported on 12/9/2019), Disp: 3 Inhaler, Rfl: 1    Objective:    Vitals:    08/06/20 1231   BP: 137/82   BP Location: Left arm   Patient Position: Sitting   Cuff Size: Large   Pulse: 93   Resp: 18   Temp: 97 8 °F (36 6 °C)   TempSrc: Temporal   Weight: 96 2 kg (212 lb)   Height: 5' 1" (1 549 m)       Physical Exam  Constitutional:       General: She is not in acute distress  Appearance: She is well-developed  HENT:      Head: Normocephalic and atraumatic  Eyes:      General: Lids are normal  No scleral icterus  Conjunctiva/sclera: Conjunctivae normal    Neck:      Musculoskeletal: Neck supple  No muscular tenderness  Cardiovascular:      Rate and Rhythm: Normal rate and regular rhythm  Heart sounds: S1 normal and S2 normal  No murmur  No friction rub  Pulmonary:      Effort: Pulmonary effort is normal  No tachypnea or respiratory distress  Breath sounds: Normal breath sounds  No wheezing, rhonchi or rales  Musculoskeletal:         General: Tenderness present  Comments: R shoulder tenderness, bilateral knee tenderness, bilateral MTP squeeze tenderness   Skin:     General: Skin is warm and dry  Findings: No rash  Nails: There is no clubbing  Neurological:      Mental Status: She is alert  Sensory: No sensory deficit  Psychiatric:         Behavior: Behavior normal  Behavior is cooperative  Reviewed labs and imaging  Imaging: Outside Bilateral Hand x-rays 11/291/9  Small areas suggestive of erosions, most notable right hand lunate,  along the scapholunate joint  Mild osteoarthritis of the IP joints  Right third finger DIP joint differential includes gouty arthropathy as well as arthritis      Outside Bilateral Feet x-rays 11/29/19  IMPRESSION:  Impression: Degenerative changes are demonstrated at the right and at the left foot   No erosive arthropathy is seen         Labs:   Outside [facility] 1/27-28/19: anti-dsDNA negative, anti-SSA/SSB negative, anti-Smith negative, anti-RNP negative, anti-Scl70 Ab negative, ANCA slightly positive at 1:80, GA-3 positive at 24, normal C3, C4 elevated at 40 7, CRP <3, cryoglobulin negative    Telemedicine on 05/18/2020   Component Date Value Ref Range Status    WBC 05/28/2020 9 76  4 31 - 10 16 Thousand/uL Final    RBC 05/28/2020 4 66  3 81 - 5 12 Million/uL Final    Hemoglobin 05/28/2020 13 8  11 5 - 15 4 g/dL Final    Hematocrit 05/28/2020 43 5  34 8 - 46 1 % Final    MCV 05/28/2020 93  82 - 98 fL Final    MCH 05/28/2020 29 6  26 8 - 34 3 pg Final    MCHC 05/28/2020 31 7  31 4 - 37 4 g/dL Final    RDW 05/28/2020 13 6  11 6 - 15 1 % Final    MPV 05/28/2020 9 7  8 9 - 12 7 fL Final    Platelets 54/62/8901 409* 149 - 390 Thousands/uL Final    nRBC 05/28/2020 0  /100 WBCs Final    Neutrophils Relative 05/28/2020 83* 43 - 75 % Final    Immat GRANS % 05/28/2020 0  0 - 2 % Final    Lymphocytes Relative 05/28/2020 12* 14 - 44 % Final    Monocytes Relative 05/28/2020 4  4 - 12 % Final    Eosinophils Relative 05/28/2020 0  0 - 6 % Final    Basophils Relative 05/28/2020 1  0 - 1 % Final    Neutrophils Absolute 05/28/2020 8 12* 1 85 - 7 62 Thousands/µL Final    Immature Grans Absolute 05/28/2020 0 04  0 00 - 0 20 Thousand/uL Final    Lymphocytes Absolute 05/28/2020 1 12  0 60 - 4 47 Thousands/µL Final    Monocytes Absolute 05/28/2020 0 42  0 17 - 1 22 Thousand/µL Final    Eosinophils Absolute 05/28/2020 0 01  0 00 - 0 61 Thousand/µL Final    Basophils Absolute 05/28/2020 0 05  0 00 - 0 10 Thousands/µL Final    Sodium 05/28/2020 137  136 - 145 mmol/L Final    Potassium 05/28/2020 3 6  3 5 - 5 3 mmol/L Final    Chloride 05/28/2020 102  100 - 108 mmol/L Final    CO2 05/28/2020 29  21 - 32 mmol/L Final    ANION GAP 05/28/2020 6  4 - 13 mmol/L Final    BUN 05/28/2020 12  5 - 25 mg/dL Final    Creatinine 05/28/2020 0 86  0 60 - 1 30 mg/dL Final    Standardized to IDMS reference method    Glucose, Fasting 05/28/2020 103* 65 - 99 mg/dL Final      Specimen collection should occur prior to Sulfasalazine administration due to the potential for falsely depressed results  Specimen collection should occur prior to Sulfapyridine administration due to the potential for falsely elevated results   Calcium 05/28/2020 9 2  8 3 - 10 1 mg/dL Final    AST 05/28/2020 18  5 - 45 U/L Final      Specimen collection should occur prior to Sulfasalazine administration due to the potential for falsely depressed results   ALT 05/28/2020 37  12 - 78 U/L Final      Specimen collection should occur prior to Sulfasalazine and/or Sulfapyridine administration due to the potential for falsely depressed results       Alkaline Phosphatase 05/28/2020 77  46 - 116 U/L Final    Total Protein 05/28/2020 7 7  6 4 - 8 2 g/dL Final    Albumin 05/28/2020 3 7  3 5 - 5 0 g/dL Final    Total Bilirubin 05/28/2020 0 37  0 20 - 1 00 mg/dL Final      Use of this assay is not recommended for patients undergoing treatment with eltrombopag due to the potential for falsely elevated results   eGFR 05/28/2020 76  ml/min/1 73sq m Final    CRP 05/28/2020 7 4* <3 0 mg/L Final    Sed Rate 05/28/2020 53* 0 - 20 mm/hour Final    Vit D, 25-Hydroxy 05/28/2020 36 6  30 0 - 100 0 ng/mL Final   Appointment on 01/24/2020   Component Date Value Ref Range Status    Hepatitis B Surface Ag 01/24/2020 Non-reactive  Non-reactive, NonReactive - Confirmed Final    Hep A IgM 01/24/2020 Non-reactive  Non-reactive, Equivocal-Suggest Recollect Final    Hepatitis C Ab 01/24/2020 Non-reactive  Non-reactive Final    Hep B C IgM 01/24/2020 Non-reactive  Non-reactive Final   Office Visit on 12/10/2019   Component Date Value Ref Range Status    C-ANCA 01/24/2020 <1:20  Neg:<1:20 titer Final    Atypical pANCA 01/24/2020 1:80* Neg:<1:20 titer Final    The atypical pANCA pattern has been observed in a significant  percentage of patients with ulcerative colitis, primary sclerosing  cholangitis and autoimmune hepatitis   MPO AB 01/24/2020 <9 0  0 0 - 9 0 U/mL Final    UT-3 AB 01/24/2020 <3 5  0 0 - 3 5 U/mL Final    P-ANCA 01/24/2020 <1:20  Neg:<1:20 titer Final    The presence of positive fluorescence exhibiting P-ANCA or C-ANCA  patterns alone is not specific for the diagnosis of Wegener's  Granulomatosis (WG) or microscopic polyangiitis  Decisions about  treatment should not be based solely on ANCA IFA results  The  International ANCA Group Consensus recommends follow up testing of  positive sera with both UT-3 and MPO-ANCA enzyme immunoassays  As  many as 5% serum samples are positive only by EIA  Ref  AM J Clin Pathol 1999;111:507-513      Total CK 01/24/2020 127  26 - 192 U/L Final    Aldolase 01/24/2020 4 7  3 3 - 10 3 U/L Final   Office Visit on 10/29/2019   Component Date Value Ref Range Status    WBC 01/24/2020 11 05* 4 31 - 10 16 Thousand/uL Final    RBC 01/24/2020 4 57  3 81 - 5 12 Million/uL Final    Hemoglobin 01/24/2020 13 8  11 5 - 15 4 g/dL Final    Hematocrit 01/24/2020 42 6  34 8 - 46 1 % Final    MCV 01/24/2020 93  82 - 98 fL Final    MCH 01/24/2020 30 2  26 8 - 34 3 pg Final    MCHC 01/24/2020 32 4  31 4 - 37 4 g/dL Final    RDW 01/24/2020 14 3  11 6 - 15 1 % Final    MPV 01/24/2020 9 1  8 9 - 12 7 fL Final    Platelets 91/07/6292 438* 149 - 390 Thousands/uL Final    nRBC 01/24/2020 0  /100 WBCs Final    Neutrophils Relative 01/24/2020 76* 43 - 75 % Final    Immat GRANS % 01/24/2020 1  0 - 2 % Final    Lymphocytes Relative 01/24/2020 14  14 - 44 % Final    Monocytes Relative 01/24/2020 7  4 - 12 % Final    Eosinophils Relative 01/24/2020 1  0 - 6 % Final    Basophils Relative 01/24/2020 1  0 - 1 % Final    Neutrophils Absolute 01/24/2020 8 50* 1 85 - 7 62 Thousands/µL Final    Immature Grans Absolute 01/24/2020 0 06  0 00 - 0 20 Thousand/uL Final    Lymphocytes Absolute 01/24/2020 1 53  0 60 - 4 47 Thousands/µL Final    Monocytes Absolute 01/24/2020 0 79  0 17 - 1 22 Thousand/µL Final    Eosinophils Absolute 01/24/2020 0 08  0 00 - 0 61 Thousand/µL Final    Basophils Absolute 01/24/2020 0 09  0 00 - 0 10 Thousands/µL Final    Sodium 01/24/2020 138  136 - 145 mmol/L Final    Potassium 01/24/2020 3 6  3 5 - 5 3 mmol/L Final    Chloride 01/24/2020 101  100 - 108 mmol/L Final    CO2 01/24/2020 30  21 - 32 mmol/L Final    ANION GAP 01/24/2020 7  4 - 13 mmol/L Final    BUN 01/24/2020 26* 5 - 25 mg/dL Final    Creatinine 01/24/2020 1 06  0 60 - 1 30 mg/dL Final    Standardized to IDMS reference method    Glucose, Fasting 01/24/2020 85  65 - 99 mg/dL Final      Specimen collection should occur prior to Sulfasalazine administration due to the potential for falsely depressed results  Specimen collection should occur prior to Sulfapyridine administration due to the potential for falsely elevated results   Calcium 01/24/2020 9 5  8 3 - 10 1 mg/dL Final    AST 01/24/2020 14  5 - 45 U/L Final      Specimen collection should occur prior to Sulfasalazine administration due to the potential for falsely depressed results   ALT 01/24/2020 31  12 - 78 U/L Final      Specimen collection should occur prior to Sulfasalazine and/or Sulfapyridine administration due to the potential for falsely depressed results       Alkaline Phosphatase 01/24/2020 77  46 - 116 U/L Final    Total Protein 01/24/2020 7 7  6 4 - 8 2 g/dL Final    Albumin 01/24/2020 3 9  3 5 - 5 0 g/dL Final    Total Bilirubin 01/24/2020 0 38  0 20 - 1 00 mg/dL Final    eGFR 01/24/2020 59  ml/min/1 73sq m Final    Sed Rate 01/24/2020 23* 0 - 20 mm/hour Final    CRP 01/24/2020 4 2* <3 0 mg/L Final    Vit D, 25-Hydroxy 01/24/2020 29 8* 30 0 - 100 0 ng/mL Final   Office Visit on 10/01/2019   Component Date Value Ref Range Status     RAPID STREP A 10/01/2019 Negative  Negative Final   Appointment on 09/16/2019   Component Date Value Ref Range Status    Hepatitis B Surface Ag 09/16/2019 Non-reactive  Non-reactive, NonReactive - Confirmed Final    Hep A IgM 09/16/2019 Non-reactive  Non-reactive, Equivocal-Suggest Recollect Final    Hepatitis C Ab 09/16/2019 Non-reactive  Non-reactive Final    Hep B C IgM 09/16/2019 Non-reactive  Non-reactive Final   Consult on 09/16/2019   Component Date Value Ref Range Status    Hepatitis B Surface Ag 09/16/2019 Non-reactive  Non-reactive, NonReactive - Confirmed Final    Hepatitis C Ab 09/16/2019 Non-reactive  Non-reactive Final    Hep B C IgM 09/16/2019 Non-reactive  Non-reactive Final    Hep B Core Total Ab 09/16/2019 Non-reactive  Non-reactive Final    QFT Nil 09/16/2019 0 04  0 - 8 0 IU/ml Final    QFT TB1-NIL 09/16/2019 0 00  IU/ml Final    QFT TB2-NIL 09/16/2019 0 00  IU/ml Final    QFT Mitogen-NIL 09/16/2019 0 22  IU/ml Final    QFT Final Interpretation 09/16/2019 Indeterminate* Negative Final    Indeterminate results due to low Interferon-gamma in the mitogen minus nil result (<0 50 IU/ml) may occur due to low lymphocyte count, reduced lymphocyte activity, or inablility of the patient's lymphocytes to generate interferon-gamma  Indeterminate results due to a high level of Interferon-gamma in the Nil tube (>8 00 IU/ml) may occur due to a heterophile antibody or nonspecific Interferon-gamma in the patient's blood sample  Indeterminate results may occur due to incorrect collection, transport or handling of the blood specimen  Based on the given collection time and lab receipt time, this specimen meets acceptable criteria for testing  Repeat testing on a new specimen is suggested   PTT Lupus Anticoagulant 09/16/2019 40 8  0 0 - 51 9 sec Final    Dilute Viper Venom Time 09/16/2019 39 6  0 0 - 47 0 sec Final    DILUTE PROTHROMBIN TIME(DPT) 09/16/2019 40 9  0 0 - 55 0 sec Final    THROMBIN TIME (DRVW) 09/16/2019 17 2  0 0 - 23 0 sec Final    DPT CONFIRM RATIO 09/16/2019 1 22  0 00 - 1 40 Ratio Final    LUPUS REFLEX INTERPRETATION 09/16/2019 Comment:   Final    No lupus anticoagulant was detected      Anticardiolipin IgA 09/16/2019 <9  0 - 11 APL U/mL Final                              Negative:              <12                            Indeterminate:     12 - 20                            Low-Med Positive: >20 - 80                            High Positive:         >80    Anticardiolipin IgG 09/16/2019 <9  0 - 14 GPL U/mL Final                              Negative:              <15                            Indeterminate:     15 - 20                            Low-Med Positive: >20 - 80                            High Positive:         >80    Anticardiolipin IgM 09/16/2019 <9  0 - 12 MPL U/mL Final                              Negative:              <13 Indeterminate:     13 - 20                            Low-Med Positive: >20 - 80                            High Positive:         >80    Beta-2 Glyco 1 IgG 09/16/2019 <9  0 - 20 GPI IgG units Final    The reference interval reflects a 3SD or 99th percentile interval,  which is thought to represent a potentially clinically significant  result in accordance with the International Consensus Statement on  the classification criteria for definitive antiphospholipid syndrome  (APS)  J Thromb Haem 2006;4:295-306   Beta-2 Glyco 1 IgA 09/16/2019 <9  0 - 25 GPI IgA units Final    The reference interval reflects a 3SD or 99th percentile interval,  which is thought to represent a potentially clinically significant  result in accordance with the International Consensus Statement on  the classification criteria for definitive antiphospholipid syndrome  (APS)  J Thromb Haem 2006;4:295-306   Beta-2 Glyco 1 IgM 09/16/2019 <9  0 - 32 GPI IgM units Final    The reference interval reflects a 3SD or 99th percentile interval,  which is thought to represent a potentially clinically significant  result in accordance with the International Consensus Statement on  the classification criteria for definitive antiphospholipid syndrome  (APS)  J Thromb Haem 2006;4:295-306     Appointment on 08/31/2019   Component Date Value Ref Range Status    WBC 08/31/2019 9 47  4 31 - 10 16 Thousand/uL Final    RBC 08/31/2019 4 55  3 81 - 5 12 Million/uL Final    Hemoglobin 08/31/2019 12 9  11 5 - 15 4 g/dL Final    Hematocrit 08/31/2019 41 5  34 8 - 46 1 % Final    MCV 08/31/2019 91  82 - 98 fL Final    MCH 08/31/2019 28 4  26 8 - 34 3 pg Final    MCHC 08/31/2019 31 1* 31 4 - 37 4 g/dL Final    RDW 08/31/2019 14 5  11 6 - 15 1 % Final    MPV 08/31/2019 9 4  8 9 - 12 7 fL Final    Platelets 40/45/4085 429* 149 - 390 Thousands/uL Final    nRBC 08/31/2019 0  /100 WBCs Final    Neutrophils Relative 08/31/2019 52  43 - 75 % Final    Immat GRANS % 08/31/2019 0  0 - 2 % Final    Lymphocytes Relative 08/31/2019 37  14 - 44 % Final    Monocytes Relative 08/31/2019 8  4 - 12 % Final    Eosinophils Relative 08/31/2019 2  0 - 6 % Final    Basophils Relative 08/31/2019 1  0 - 1 % Final    Neutrophils Absolute 08/31/2019 4 95  1 85 - 7 62 Thousands/µL Final    Immature Grans Absolute 08/31/2019 0 04  0 00 - 0 20 Thousand/uL Final    Lymphocytes Absolute 08/31/2019 3 50  0 60 - 4 47 Thousands/µL Final    Monocytes Absolute 08/31/2019 0 74  0 17 - 1 22 Thousand/µL Final    Eosinophils Absolute 08/31/2019 0 15  0 00 - 0 61 Thousand/µL Final    Basophils Absolute 08/31/2019 0 09  0 00 - 0 10 Thousands/µL Final    Sodium 08/31/2019 138  136 - 145 mmol/L Final    Potassium 08/31/2019 3 6  3 5 - 5 3 mmol/L Final    Chloride 08/31/2019 102  100 - 108 mmol/L Final    CO2 08/31/2019 31  21 - 32 mmol/L Final    ANION GAP 08/31/2019 5  4 - 13 mmol/L Final    BUN 08/31/2019 20  5 - 25 mg/dL Final    Creatinine 08/31/2019 0 84  0 60 - 1 30 mg/dL Final    Standardized to IDMS reference method    Glucose, Fasting 08/31/2019 68  65 - 99 mg/dL Final      Specimen collection should occur prior to Sulfasalazine administration due to the potential for falsely depressed results  Specimen collection should occur prior to Sulfapyridine administration due to the potential for falsely elevated results   Calcium 08/31/2019 9 1  8 3 - 10 1 mg/dL Final    AST 08/31/2019 11  5 - 45 U/L Final      Specimen collection should occur prior to Sulfasalazine administration due to the potential for falsely depressed results   ALT 08/31/2019 24  12 - 78 U/L Final      Specimen collection should occur prior to Sulfasalazine and/or Sulfapyridine administration due to the potential for falsely depressed results       Alkaline Phosphatase 08/31/2019 70  46 - 116 U/L Final    Total Protein 08/31/2019 7 0  6 4 - 8 2 g/dL Final    Albumin 08/31/2019 3 2* 3 5 - 5 0 g/dL Final  Total Bilirubin 08/31/2019 0 36  0 20 - 1 00 mg/dL Final    eGFR 08/31/2019 79  ml/min/1 73sq m Final    Sed Rate 08/31/2019 25* 0 - 20 mm/hour Final    CRP 08/31/2019 14 3* <3 0 mg/L Final    CORNEL 08/31/2019 Negative  Negative Final    Rheumatoid Factor 08/31/2019 Negative  Negative Final    Vit D, 25-Hydroxy 08/31/2019 13 8* 30 0 - 100 0 ng/mL Final    TSH 3RD GENERATON 08/31/2019 2 340  0 358 - 3 740 uIU/mL Final      The recommended reference ranges for TSH during pregnancy are as follows:   First trimester 0 1 to 2 5 uIU/mL   Second trimester  0 2 to 3 0 uIU/mL   Third trimester 0 3 to 3 0 uIU/m    Note: Normal ranges may not apply to patients who are transgender, non-binary, or whose legal sex, sex at birth, and gender identity differ  Using supplements with high doses of biotin 20 to more than 300 times greater than the adequate daily intake for adults of 30 mcg/day as established by the [employer], can cause falsely depress results      Hepatitis C Ab 08/31/2019 Non-reactive  Non-reactive Final

## 2020-08-06 NOTE — PATIENT INSTRUCTIONS
Start hydroxychloroquine twice a day; get regular eye exams  Can use diclofenac gel or ibuprofen for knee pain as needed, don't use at same time  Continue 2,000 units a day of Vit  D  Contact Orthopedics regarding right knee surgery    Return to clinic in 3 months     Hydroxychloroquine (By mouth)   Hydroxychloroquine (yfv-cfau-is-KLOR-oh-kwin)  Treats or prevents malaria  Also used to treat lupus and arthritis  Brand Name(s): Plaquenil   There may be other brand names for this medicine  When This Medicine Should Not Be Used: You should not use this medicine if you have ever had an allergic reaction to hydroxychloroquine  How to Use This Medicine:   Tablet  · Your doctor will tell you how much to take and how often  You should not use this medicine more often or in greater amounts than your doctor ordered  · Take this medicine with food or milk  If a dose is missed:   · If you take one dose a week, take the missed dose as soon as possible  Then return to your regular dosing schedule  · If you take one or more doses daily, take the missed dose as soon as possible  If it is almost time for your next dose, wait until then to take your medicine and skip the missed dose  · You should not use two doses at the same time  How to Store and Dispose of This Medicine:   · Store at room temperature, away from heat, moisture, and direct light  · Keep all medicine out of the reach of children  Drugs and Foods to Avoid:      Ask your doctor or pharmacist before using any other medicine, including over-the-counter medicines, vitamins, and herbal products  Warnings While Using This Medicine:   · If you are pregnant or breastfeeding, talk to your doctor before taking this medicine  · Talk to your doctor if you have vision or eye problems, liver disease, skin disorder, or any other medical problems before you take this medicine  · This medicine may cause dizziness and vision changes   Be careful while driving a car or operating machinery  Possible Side Effects While Using This Medicine:   Call your doctor right away if you notice any of these side effects:  · Wheezing or trouble breathing  · Severe or bloody diarrhea  · Blurred vision or eye problems  · Skin rash or itching  · Unexplained fever, unusual bleeding or bruising  If you notice these less serious side effects, talk with your doctor:   · Headache  · Nausea or upset stomach  · Diarrhea  · Appetite loss  · Bleaching of hair or loss of hair  If you notice other side effects that you think are caused by this medicine, tell your doctor  Call your doctor for medical advice about side effects  You may report side effects to FDA at 0-198-FDA-9465  © 2017 [employer] Information is for End User's use only and may not be sold, redistributed or otherwise used for commercial purposes  The above information is an  only  It is not intended as medical advice for individual conditions or treatments  Talk to your doctor, nurse or pharmacist before following any medical regimen to see if it is safe and effective for you  Rheumatoid Arthritis   AMBULATORY CARE:   Rheumatoid arthritis  is a long-term autoimmune disease that causes inflammation and damage to your joints  RA causes your body's immune system to attack the synovial membrane (lining) in your joints  RA can also affect other organs, such as your eyes, heart, or lungs  It may also increase your risk of osteoporosis (weakened bones)  Common symptoms include the following:   · Joint pain and stiffness that lasts longer than 1 hour    · Swollen joints in the same joint on both sides of your body    · Loss of joint movement     · Firm, round nodules (growths) on your joints    · Fatigue or muscle weakness    · Loss of appetite or weight loss  Seek care immediately if:   · You have increased joint swelling, pain, or redness  · You have sudden shortness of breath       · You lose feeling in your hands or feet  Contact your healthcare provider or rheumatologist if:   · You have a fever  · Your skin is itchy, swollen, or has a rash  · Your symptoms are getting worse, even with treatment  · You have questions or concerns about your condition or care  Treatment for rheumatoid arthritis  may include any of the following:  · Antirheumatics  help slow the progress of RA, and reduce pain, stiffness, and inflammation  · NSAIDs , such as ibuprofen, help decrease swelling, pain, and fever  This medicine is available with or without a doctor's order  NSAIDs can cause stomach bleeding or kidney problems in certain people  If you take blood thinner medicine, always ask your healthcare provider if NSAIDs are safe for you  Always read the medicine label and follow directions  · Steroids  help decrease inflammation  · Biologic therapy  helps decrease joint swelling, pain, and stiffness  These medicines increase the risk of serious infection and require careful monitoring  · Take your medicine as directed  Contact your healthcare provider if you think your medicine is not helping or if you have side effects  Tell him of her if you are allergic to any medicine  Keep a list of the medicines, vitamins, and herbs you take  Include the amounts, and when and why you take them  Bring the list or the pill bottles to follow-up visits  Carry your medicine list with you in case of an emergency  · Surgery  may be needed to remove all or part of your joint  An implant may be placed to help reduce pain and repair the joint  Manage your symptoms:   · Go to physical and occupational therapy as directed  A physical therapist can teach you exercises to help improve movement and strength, and to decrease pain  An occupational therapist can teach you skills to help with your daily activities  · Use support devices    You may be given splints or braces to help your joints rest and to decrease inflammation  · Rest when your joints are painful  Limit your activities until your symptoms improve  Gradually start your normal activities when you can do them without pain  Avoid motions and activities that cause strain on your joints, such as heavy exercise and lifting  · Apply ice or heat  Both can help decrease swelling and pain  Use an ice pack, or put crushed ice in a plastic bag  Cover it with a towel and place it on your joint for 15 to 20 minutes every hour or as directed  You can apply heat for 20 minutes every 2 hours  Heat treatment includes hot packs or a warm compress  · Maintain a healthy weight  to decrease the strain on joints in your back, knees, ankles, and feet  Ask your healthcare provider how much you should weigh  Ask him to help you create a weight loss plan if you are overweight  · Physical activity  can help increase strength and flexibility  Be as active as possible while avoiding things that increase your pain  Ask your healthcare provider or rheumatologist about the best exercise plan for you  Ask your healthcare provider about vaccines: You may be at an increased risk for infections due to RA and its treatment  Vaccines may prevent infections from various viruses  Follow up with your healthcare provider as directed:  Write down your questions so you remember to ask them during your visits  © 2017 [employer]  Information is for End User's use only and may not be sold, redistributed or otherwise used for commercial purposes  All illustrations and images included in CareNotes® are the copyrighted property of [employer]  or [employer]  The above information is an  only  It is not intended as medical advice for individual conditions or treatments  Talk to your doctor, nurse or pharmacist before following any medical regimen to see if it is safe and effective for you

## 2020-08-07 NOTE — TELEPHONE ENCOUNTER
Can you forward this message to Aurora Nunez so that she can contact patient? I don't know her last name      Thanks,  AMANDA

## 2020-08-07 NOTE — TELEPHONE ENCOUNTER
Very plesaent young lady called again   She asked to be called back to discuss the shower chair    [phone]

## 2020-08-07 NOTE — TELEPHONE ENCOUNTER
Hernan Robbins, our DME fitter is not here today to look at this personally; she returns on Monday  Sending this to you to remind yourself to find Dayami on Monday   I will leave her a note on her desk as well

## 2020-08-10 NOTE — TELEPHONE ENCOUNTER
Wiliam Webb had spoken with Dr Gera Mckeon regarding this order and to clarify what the patient wants/needs  As per Wiliam Webb, "i've spoken to her twice now today and she sounds unsure of what she wants   Sounds like due to her TBI, she wants to confer with her friend "

## 2020-08-10 NOTE — TELEPHONE ENCOUNTER
Patient is calling back very confused  She stated she had a traumatic brain injury and she isn't understanding what is going on and who Lucian Perdue is  She would like to know if someone from Dr Josi Arroyo office can call her and go over in details what is happening with her shower show       CB: [phone]

## 2020-08-11 DIAGNOSIS — M17.0 OSTEOARTHRITIS OF BOTH KNEES, UNSPECIFIED OSTEOARTHRITIS TYPE: Primary | ICD-10-CM

## 2020-08-11 NOTE — TELEPHONE ENCOUNTER
As we discussued, Shahana Bourgeois takes care of DME stuff, and can only provide patient with a basic shower chair      Thanks,  HM

## 2020-08-11 NOTE — TELEPHONE ENCOUNTER
I sent the DME order for the shower chair to the printer, she can come by to pick it up, or you can fax it to any of the locations she wants you to fax it to    Thanks,  HM

## 2020-08-11 NOTE — TELEPHONE ENCOUNTER
I spoke with the patient  She stated she contacted the insurance  They provided her with 3 companies that she'd be able to get the sliding shower chair  [address] [facility] in [place]: [id number] [facility] in [facility]: [facility] [address] in [place]: [phone]     Information forwarded to Dayami

## 2020-08-11 NOTE — LETTER
September 17, 2020     Nettie Gayle  [facility] [id number]    Patient: Nettie Gayle   YOB: 1965   Date of Visit: 8/11/2020       To Whom It May Concern at [facility] and [facility]:    Ms Nettie Gayle is under my professional care  I follow her for her Rheumatoid arthritis as well as her significant bilateral knee osteoarthritis  Patient's bilateral knee osteoarthritis has caused her to have significant dysfunction with her activities of daily living  She has a medical need for a shower transfer chair to be able to get in and out of her bathtub  Thank you for the consideration  If you have questions, please do not hesitate to call me            Sincerely,        Car Spain MD

## 2020-08-11 NOTE — TELEPHONE ENCOUNTER
Elsa Whiting responded via separate private message  She does not deal with those companies listed below  The patient would need to take things into her own hands with those companies/insurance  I wanted to double check with you if there is anything we specifically need to do (ie: enter the DME order for sliding shower chair, etc ) before I reach back out to the patient to inform her

## 2020-08-13 ENCOUNTER — TELEPHONE (OUTPATIENT)
Dept: OBGYN CLINIC | Facility: MEDICAL CENTER | Age: 55
End: 2020-08-13

## 2020-08-14 ENCOUNTER — OFFICE VISIT (OUTPATIENT)
Dept: OBGYN CLINIC | Facility: MEDICAL CENTER | Age: 55
End: 2020-08-14
Payer: COMMERCIAL

## 2020-08-14 ENCOUNTER — APPOINTMENT (OUTPATIENT)
Dept: RADIOLOGY | Facility: MEDICAL CENTER | Age: 55
End: 2020-08-14
Payer: COMMERCIAL

## 2020-08-14 VITALS
DIASTOLIC BLOOD PRESSURE: 79 MMHG | TEMPERATURE: 98 F | BODY MASS INDEX: 40.78 KG/M2 | SYSTOLIC BLOOD PRESSURE: 135 MMHG | HEART RATE: 84 BPM | HEIGHT: 61 IN | WEIGHT: 216 LBS

## 2020-08-14 DIAGNOSIS — M17.11 PRIMARY OSTEOARTHRITIS OF RIGHT KNEE: Primary | ICD-10-CM

## 2020-08-14 DIAGNOSIS — Z01.89 ENCOUNTER FOR LOWER EXTREMITY COMPARISON IMAGING STUDY: ICD-10-CM

## 2020-08-14 DIAGNOSIS — M25.561 RIGHT KNEE PAIN, UNSPECIFIED CHRONICITY: ICD-10-CM

## 2020-08-14 PROCEDURE — 3008F BODY MASS INDEX DOCD: CPT | Performed by: ORTHOPAEDIC SURGERY

## 2020-08-14 PROCEDURE — 73564 X-RAY EXAM KNEE 4 OR MORE: CPT

## 2020-08-14 PROCEDURE — 99213 OFFICE O/P EST LOW 20 MIN: CPT | Performed by: ORTHOPAEDIC SURGERY

## 2020-08-14 PROCEDURE — 73560 X-RAY EXAM OF KNEE 1 OR 2: CPT

## 2020-08-14 PROCEDURE — 3078F DIAST BP <80 MM HG: CPT | Performed by: ORTHOPAEDIC SURGERY

## 2020-08-14 PROCEDURE — 3075F SYST BP GE 130 - 139MM HG: CPT | Performed by: ORTHOPAEDIC SURGERY

## 2020-08-14 NOTE — PROGRESS NOTES
Assessment/Plan     1  Primary osteoarthritis of right knee    2  Right knee pain, unspecified chronicity    3  Encounter for lower extremity comparison imaging study      Orders Placed This Encounter   Procedures    XR knee 4+ vw right injury    XR knee 1 or 2 vw left    Ambulatory referral to Physical Therapy       · Patient has moderate right knee osteoarthritis  This has progressed since her last visit  Given the severity of her arthritis at this point I think a knee arthroscopy would be minimally beneficial     · Discussed with patient conservative treatments for arthritis: CSI, visco supplementation injection, physical therapy, bracing   · Patient declined CSI in the office today  · Patient will call if she wants the visco supplementation injection : Discussed with the patient two different type of injections: 1 time injection vs 3 series injections  · Patient to start taking Hydroxychloroquine per Dr Paddy Herrera  · Physical therapy order was given out today for the right knee   · Provided patient with short hinged knee brace vs T-ROM brace   · Patient will call with the correct shower chair order if not resolved by rheum in next couple days  · May take Tylenol 1000 mg prn for pain (will avoid NSAIDs- hx of gastric lap band surgery)   · Continue using Diclofenac gel prn for pain   Return if symptoms worsen or fail to improve  I answered all of the patient's questions during the visit and provided education of the patient's condition during the visit  The patient verbalized understanding of the information given and agrees with the plan  This note was dictated using [employer] software  It may contain errors including improperly dictated words  Please contact physician directly for any questions  History of Present Illness   Chief complaint:   Chief Complaint   Patient presents with    Right Knee - Follow-up       HPI: Nettie Gayle is a 53 y o  female that c/o right knee pain   She has a history of right knee medial and lateral meniscal tears and was scheduled for arthroscopic surgery  But was canceled due to increase white blood cell count  She is currently treating with Dr Gale Jin for rheumatoid arthritis and is going to start taking  Hydroxychloroquine, but is taking IBU and using Diclofenac gel  She is weaning off the prednisone and is down to 2 mg now  She is having constant sharp stabbing pain generalized right knee  She notes instability  Pain is worse with going up and down steps, walking, standing and bending the knee  She is wearing a T-ROM brace at night with relief  She is using a rollator walker when walking  She has had physical therapy in the past with no relief  She has history of gastric lap band procedure in the past (no NSAIDS but does take IBU)  ROS:    See HPI for musculoskeletal review     All other systems reviewed are negative     Historical Information   Past Medical History:   Diagnosis Date    Abdominal hernia     Anxiety     Arthritis     o a  left knee    Asthma     Brain injury ([facility] [address] )     Short term memory problems; confusion; has affected depth perception," injury at work"    COPD (chronic obstructive pulmonary disease) ([facility] [address] )     CPAP (continuous positive airway pressure) dependence     Depression     Fatty liver     GERD (gastroesophageal reflux disease)     History of gestational diabetes     Was on insulin    History of laparoscopic adjustable gastric banding     History of recent fall     3/2018    Hypertension     Knee pain, right     Obese     Pneumonia     x1    RA (rheumatoid arthritis) ([address] )     Risk for falls     Shortness of breath     "sometimes with asthma"    Shoulder pain, bilateral     pain started after a fall in     Sleep apnea     Vomiting      Past Surgical History:   Procedure Laterality Date    ANAL FISSURECTOMY       SECTION      x1    ELBOW SURGERY Left     FOOT SURGERY Bilateral     5th toes    LAPAROSCOPIC GASTRIC BANDING  11/23/2010    Dr Frankey Pastures, Samantha Kaplan    [place] EGD TRANSORAL BIOPSY SINGLE/MULTIPLE N/A 1/3/2018    Procedure: ESOPHAGOGASTRODUODENOSCOPY (EGD) with biopsy;  Surgeon: Yeyo Yates MD;  Location: [facility];   Service: Bariatrics     Social History   Social History     Substance and Sexual Activity   Alcohol Use Yes    Frequency: Never    Comment: social     Social History     Substance and Sexual Activity   Drug Use No     Social History     Tobacco Use   Smoking Status Current Some Day Smoker    Packs/day: 0 15    Years: 3 00    Pack years: 0 45    Types: Cigarettes   Smokeless Tobacco Never Used   Tobacco Comment    pt reports has cut back     Family History:   Family History   Problem Relation Age of Onset    Diabetes type II Mother     Hypertension Mother     Arthritis Mother     Congenital heart disease Mother    Jullie Liming Stroke Mother     Diabetes Mother     Diabetes type II Father     Hypertension Father     Asthma Father     Diabetes Father     Arthritis Father     Sudden death Other 28        niece    Asthma Sister     Diabetes Sister     Arthritis Sister     Asthma Brother     Diabetes Brother     Seizures Son     Asthma Maternal Aunt        Current Outpatient Medications on File Prior to Visit   Medication Sig Dispense Refill    acetaminophen (TYLENOL) 650 mg CR tablet Take 650 mg by mouth every 12 (twelve) hours      albuterol (2 5 mg/3 mL) 0 083 % nebulizer solution Take 1 vial (2 5 mg total) by nebulization every 6 (six) hours as needed for wheezing 150 mL 3    albuterol (PROVENTIL HFA,VENTOLIN HFA) 90 mcg/act inhaler Inhale 2 puffs 4 (four) times a day As directed 3 Inhaler 1    budesonide-formoterol (SYMBICORT) 160-4 5 mcg/act inhaler Inhale 2 puffs 2 (two) times a day      buPROPion (WELLBUTRIN SR) 150 mg 12 hr tablet 2 am, 1 pm 90 tablet 5    clotrimazole-betamethasone (LOTRISONE) 1-0 05 % cream Apply topically 2 (two) times a day (Patient not taking: Reported on 12/9/2019) 30 g 0    diclofenac sodium (VOLTAREN) 1 % Apply 4 g topically 4 (four) times a day as needed (pain) 300 g 6    diphenhydrAMINE (BENADRYL) 50 mg capsule Take 50 mg by mouth every 6 (six) hours as needed      fluticasone (FLONASE) 50 mcg/act nasal spray 1 spray into each nostril as needed        hydroxychloroquine (PLAQUENIL) 200 mg tablet Take 1 tablet (200 mg total) by mouth 2 (two) times a day 180 tablet 1    hydrOXYzine HCL (ATARAX) 25 mg tablet TAKE 1 TABLET (25 MG TOTAL) BY MOUTH EVERY 6 (SIX) HOURS AS NEEDED FOR ITCHING (Patient not taking: Reported on 7/28/2020) 120 tablet 0    ibuprofen (MOTRIN) 800 mg tablet Take 1 tablet (800 mg total) by mouth every 8 (eight) hours as needed for mild pain or moderate pain 90 tablet 3    levalbuterol (XOPENEX HFA) 45 mcg/act inhaler Inhale 1-2 puffs every 4 (four) hours as needed for wheezing 1 Inhaler 5    montelukast (SINGULAIR) 10 mg tablet Take 1 tablet (10 mg total) by mouth daily at bedtime 90 tablet 1    mupirocin (BACTROBAN) 2 % ointment Apply twice daily to open sores 22 g 3    predniSONE 1 mg tablet 4 TABS DAILY FOR 2 WKS, THEN 3 TABS X2 WKS, THEN 2 TABS X2 WKS, THEN 1 TAB X2 WKS TAKE WITH 5MG TAB (Patient taking differently: 3 tablets daily) 120 tablet 3    SPIRIVA RESPIMAT 1 25 MCG/ACT AERS inhaler INHALE 2 INHALATIONS BY MOUTH DAILY (Patient not taking: Reported on 12/9/2019) 3 Inhaler 1    spironolactone (ALDACTONE) 25 mg tablet Take 25 mg by mouth as needed      terbinafine (LAMISIL AT) 1 % cream Apply topically to feet twice a day 60 g 3    triamcinolone (KENALOG) 0 1 % ointment APPLY TOPICALLY TWICE A DAY FOR 2 WEEKS 454 g 0    valsartan-hydrochlorothiazide (DIOVAN-HCT) 160-25 MG per tablet Take 1 tablet by mouth daily 90 tablet 1     No current facility-administered medications on file prior to visit        Allergies   Allergen Reactions    Sulfasalazine Rash     rash    Other Rash     Adhesive tape and adhesive bandaids    Wound Dressing Adhesive Other (See Comments)     rash, itchy    Adalimumab Itching and Rash       Current Outpatient Medications on File Prior to Visit   Medication Sig Dispense Refill    acetaminophen (TYLENOL) 650 mg CR tablet Take 650 mg by mouth every 12 (twelve) hours      albuterol (2 5 mg/3 mL) 0 083 % nebulizer solution Take 1 vial (2 5 mg total) by nebulization every 6 (six) hours as needed for wheezing 150 mL 3    albuterol (PROVENTIL HFA,VENTOLIN HFA) 90 mcg/act inhaler Inhale 2 puffs 4 (four) times a day As directed 3 Inhaler 1    budesonide-formoterol (SYMBICORT) 160-4 5 mcg/act inhaler Inhale 2 puffs 2 (two) times a day      buPROPion (WELLBUTRIN SR) 150 mg 12 hr tablet 2 am, 1 pm 90 tablet 5    clotrimazole-betamethasone (LOTRISONE) 1-0 05 % cream Apply topically 2 (two) times a day (Patient not taking: Reported on 12/9/2019) 30 g 0    diclofenac sodium (VOLTAREN) 1 % Apply 4 g topically 4 (four) times a day as needed (pain) 300 g 6    diphenhydrAMINE (BENADRYL) 50 mg capsule Take 50 mg by mouth every 6 (six) hours as needed      fluticasone (FLONASE) 50 mcg/act nasal spray 1 spray into each nostril as needed        hydroxychloroquine (PLAQUENIL) 200 mg tablet Take 1 tablet (200 mg total) by mouth 2 (two) times a day 180 tablet 1    hydrOXYzine HCL (ATARAX) 25 mg tablet TAKE 1 TABLET (25 MG TOTAL) BY MOUTH EVERY 6 (SIX) HOURS AS NEEDED FOR ITCHING (Patient not taking: Reported on 7/28/2020) 120 tablet 0    ibuprofen (MOTRIN) 800 mg tablet Take 1 tablet (800 mg total) by mouth every 8 (eight) hours as needed for mild pain or moderate pain 90 tablet 3    levalbuterol (XOPENEX HFA) 45 mcg/act inhaler Inhale 1-2 puffs every 4 (four) hours as needed for wheezing 1 Inhaler 5    montelukast (SINGULAIR) 10 mg tablet Take 1 tablet (10 mg total) by mouth daily at bedtime 90 tablet 1    mupirocin (BACTROBAN) 2 % ointment Apply twice daily to open sores 22 g 3    predniSONE 1 mg tablet 4 TABS DAILY FOR 2 WKS, THEN 3 TABS X2 WKS, THEN 2 TABS X2 WKS, THEN 1 TAB X2 WKS TAKE WITH 5MG TAB (Patient taking differently: 3 tablets daily) 120 tablet 3    SPIRIVA RESPIMAT 1 25 MCG/ACT AERS inhaler INHALE 2 INHALATIONS BY MOUTH DAILY (Patient not taking: Reported on 12/9/2019) 3 Inhaler 1    spironolactone (ALDACTONE) 25 mg tablet Take 25 mg by mouth as needed      terbinafine (LAMISIL AT) 1 % cream Apply topically to feet twice a day 60 g 3    triamcinolone (KENALOG) 0 1 % ointment APPLY TOPICALLY TWICE A DAY FOR 2 WEEKS 454 g 0    valsartan-hydrochlorothiazide (DIOVAN-HCT) 160-25 MG per tablet Take 1 tablet by mouth daily 90 tablet 1     No current facility-administered medications on file prior to visit  Objective   Vitals: Blood pressure 135/79, pulse 84, temperature 98 °F (36 7 °C), height 5' 1" (1 549 m), weight 98 kg (216 lb)  ,Body mass index is 40 81 kg/m²  PE:  AAOx 3  WDWN  Hearing intact, no drainage from eyes  Regular rate  no audible wheezing  no abdominal distension  LE compartments soft, skin intact    rightknee:    Appearance:  no swelling   No ecchymosis  no obvious joint deformity   Mild -moderate  effusion  Palpation/Tenderness:  +TTP over medial joint line  +TTP over lateral joint line   No TTP over patella  No TTP over patellar tendon  + TTP over pes anserine bursa  TTP over posterior aspect of the knee   Active Range of Motion:  AROM: 10-90/ Passive ROM  0-120   Special Tests:  Valgus Stress Test:  negative  Varus Stress Test:  negative     No ipsilateral hip pain with ROM    Imaging Studies: I have personally reviewed pertinent films in PACS  rightknee:   Moderate DJD     Scribe Attestation    I,:   Bina Newby am acting as a scribe while in the presence of the attending physician :        I,:   Munir Alcantara DO personally performed the services described in this documentation    as scribed in my presence :

## 2020-08-17 ENCOUNTER — TELEPHONE (OUTPATIENT)
Dept: OBGYN CLINIC | Facility: HOSPITAL | Age: 55
End: 2020-08-17

## 2020-08-17 DIAGNOSIS — M06.00 SERONEGATIVE EROSIVE RHEUMATOID ARTHRITIS (HCC): ICD-10-CM

## 2020-08-17 RX ORDER — PREDNISONE 1 MG/1
TABLET ORAL
Qty: 90 TABLET | Refills: 0 | Status: SHIPPED | OUTPATIENT
Start: 2020-08-17 | End: 2020-09-25

## 2020-08-17 NOTE — TELEPHONE ENCOUNTER
I spoke with Ms Romel Neal  She would like to consider a TKA at this time  She is taking her medication as prescribed by rheumatology  She does not think she can wait until November when I am back from maternity leave  We will schedule her to see Dr Sean Diaz to see if he thinks she is a good candidate for TKA  I also recommended pain mgt if there is any delay  She verbalized understanding and agrees with the plan

## 2020-08-17 NOTE — TELEPHONE ENCOUNTER
Patient DR Everlyn Oppenheim  RE: knee surgery  # [phone]    Patient called to speak to DR Everlyn Oppenheim regarding plan of care  Per patient, on Saturday, patient could not walk and she had to be pushed in a wheel chair    Patient states her knee is getting worse and is not sure the conservative treatment is working   patient would like to move forward to total knee replacement     Patient would like to speak to Dr Everlyn Oppenheim

## 2020-08-23 DIAGNOSIS — M06.00 SERONEGATIVE EROSIVE RHEUMATOID ARTHRITIS (HCC): ICD-10-CM

## 2020-08-24 RX ORDER — PREDNISONE 1 MG/1
TABLET ORAL
Qty: 30 TABLET | Refills: 3 | OUTPATIENT
Start: 2020-08-24

## 2020-08-25 ENCOUNTER — OFFICE VISIT (OUTPATIENT)
Dept: OBGYN CLINIC | Facility: MEDICAL CENTER | Age: 55
End: 2020-08-25
Payer: COMMERCIAL

## 2020-08-25 VITALS — WEIGHT: 213 LBS | TEMPERATURE: 97.7 F | HEIGHT: 61 IN | BODY MASS INDEX: 40.22 KG/M2

## 2020-08-25 DIAGNOSIS — M17.11 ARTHRITIS OF RIGHT KNEE: Primary | ICD-10-CM

## 2020-08-25 PROCEDURE — 99213 OFFICE O/P EST LOW 20 MIN: CPT | Performed by: ORTHOPAEDIC SURGERY

## 2020-08-25 PROCEDURE — 3008F BODY MASS INDEX DOCD: CPT | Performed by: ORTHOPAEDIC SURGERY

## 2020-08-25 PROCEDURE — 4004F PT TOBACCO SCREEN RCVD TLK: CPT | Performed by: ORTHOPAEDIC SURGERY

## 2020-08-25 PROCEDURE — 3078F DIAST BP <80 MM HG: CPT | Performed by: ORTHOPAEDIC SURGERY

## 2020-08-25 PROCEDURE — 3075F SYST BP GE 130 - 139MM HG: CPT | Performed by: ORTHOPAEDIC SURGERY

## 2020-08-25 NOTE — PROGRESS NOTES
Orthopaedic Surgery Note    CC: Right Knee Pain      HPI:  Flako Owens is a 53 y  o female with a history of right knee pain  Previously recommend arthroscopic menisectomy with Dr Digna Adler and then delayed for medical reasons  On return to clinic with Dr Alise Rhoades she had repeat radiographs and this did demonsrtate progressive degenerative changes and Dr Alise Rhoades recommended injections for treatment of OA  Patient repost she is not interested in trying injections of PT and is demanding to have a TKA        ALLERGIES:  Allergies   Allergen Reactions    Sulfasalazine Rash     rash    Other Rash     Adhesive tape and adhesive bandaids    Wound Dressing Adhesive Other (See Comments)     rash, itchy    Adalimumab Itching and Rash       CURRENT MEDICATIONS:  Current Outpatient Medications   Medication Sig Dispense Refill    acetaminophen (TYLENOL) 650 mg CR tablet Take 650 mg by mouth every 12 (twelve) hours      albuterol (2 5 mg/3 mL) 0 083 % nebulizer solution Take 1 vial (2 5 mg total) by nebulization every 6 (six) hours as needed for wheezing 150 mL 3    albuterol (PROVENTIL HFA,VENTOLIN HFA) 90 mcg/act inhaler Inhale 2 puffs 4 (four) times a day As directed 3 Inhaler 1    budesonide-formoterol (SYMBICORT) 160-4 5 mcg/act inhaler Inhale 2 puffs 2 (two) times a day      buPROPion (WELLBUTRIN SR) 150 mg 12 hr tablet 2 am, 1 pm 90 tablet 5    diclofenac sodium (VOLTAREN) 1 % Apply 4 g topically 4 (four) times a day as needed (pain) 300 g 6    fluticasone (FLONASE) 50 mcg/act nasal spray 1 spray into each nostril as needed        hydroxychloroquine (PLAQUENIL) 200 mg tablet Take 1 tablet (200 mg total) by mouth 2 (two) times a day 180 tablet 1    ibuprofen (MOTRIN) 800 mg tablet Take 1 tablet (800 mg total) by mouth every 8 (eight) hours as needed for mild pain or moderate pain 90 tablet 3    levalbuterol (XOPENEX HFA) 45 mcg/act inhaler Inhale 1-2 puffs every 4 (four) hours as needed for wheezing 1 Inhaler 5    montelukast (SINGULAIR) 10 mg tablet Take 1 tablet (10 mg total) by mouth daily at bedtime 90 tablet 1    mupirocin (BACTROBAN) 2 % ointment Apply twice daily to open sores 22 g 3    spironolactone (ALDACTONE) 25 mg tablet Take 25 mg by mouth as needed      terbinafine (LAMISIL AT) 1 % cream Apply topically to feet twice a day 60 g 3    triamcinolone (KENALOG) 0 1 % ointment APPLY TOPICALLY TWICE A DAY FOR 2 WEEKS 454 g 0    valsartan-hydrochlorothiazide (DIOVAN-HCT) 160-25 MG per tablet Take 1 tablet by mouth daily 90 tablet 1    clotrimazole-betamethasone (LOTRISONE) 1-0 05 % cream Apply topically 2 (two) times a day (Patient not taking: Reported on 12/9/2019) 30 g 0    diphenhydrAMINE (BENADRYL) 50 mg capsule Take 50 mg by mouth every 6 (six) hours as needed      hydrOXYzine HCL (ATARAX) 25 mg tablet TAKE 1 TABLET (25 MG TOTAL) BY MOUTH EVERY 6 (SIX) HOURS AS NEEDED FOR ITCHING (Patient not taking: Reported on 7/28/2020) 120 tablet 0    predniSONE 1 mg tablet 3 tablets daily (Patient not taking: Reported on 8/25/2020) 90 tablet 0    SPIRIVA RESPIMAT 1 25 MCG/ACT AERS inhaler INHALE 2 INHALATIONS BY MOUTH DAILY (Patient not taking: Reported on 12/9/2019) 3 Inhaler 1     No current facility-administered medications for this visit          PAST MEDICAL HISTORY  Past Medical History:   Diagnosis Date    Abdominal hernia     Anxiety     Arthritis     o a  left knee    Asthma     Brain injury ([facility] [address] [facility] 2014    Short term memory problems; confusion; has affected depth perception," injury at work"    COPD (chronic obstructive pulmonary disease) ([facility] [address] )     CPAP (continuous positive airway pressure) dependence     Depression     Fatty liver     GERD (gastroesophageal reflux disease)     History of gestational diabetes     Was on insulin    History of laparoscopic adjustable gastric banding     History of recent fall     3/2018    Hypertension     Knee pain, right     Obese     Pneumonia x1    RA [facility]) [facility] [address] [facility]     Risk for falls     Shortness of breath     "sometimes with asthma"    Shoulder pain, bilateral     pain started after a fall in     Sleep apnea     Vomiting        SURGICAL HISTORY  Past Surgical History:   Procedure Laterality Date    ANAL FISSURECTOMY       SECTION      x1    ELBOW SURGERY Left     FOOT SURGERY Bilateral     5th toes    LAPAROSCOPIC GASTRIC BANDING  2010    Dr Iftikhar Mario, Isabel Crum    [place] EGD TRANSORAL BIOPSY SINGLE/MULTIPLE N/A 1/3/2018    Procedure: ESOPHAGOGASTRODUODENOSCOPY (EGD) with biopsy;  Surgeon: Frances López MD;  Location: [facility];   Service: Bariatrics       FAMILY HISTORY  Family History   Problem Relation Age of Onset    Diabetes type II Mother     Hypertension Mother     Arthritis Mother     Congenital heart disease Mother     Stroke Mother     Diabetes Mother     Diabetes type II Father     Hypertension Father     Asthma Father     Diabetes Father     Arthritis Father     Sudden death Other 28        niece    Asthma Sister     Diabetes Sister     Arthritis Sister     Asthma Brother     Diabetes Brother     Seizures Son     Asthma Maternal Aunt        SOCIAL HISTORY  Social History     Socioeconomic History    Marital status: Single     Spouse name: Not on file    Number of children: 1    Years of education: Not on file    Highest education level: Not on file   Occupational History    Occupation: Unemployed "Looking for work"    Occupation: Disabled   Social Needs    Financial resource strain: Not on file    Food insecurity     Worry: Not on file     Inability: Not on file   [employer] needs     Medical: Not on file     Non-medical: Not on file   Tobacco Use    Smoking status: Current Some Day Smoker     Packs/day: 0 15     Years: 3 00     Pack years: 0 45     Types: Cigarettes    Smokeless tobacco: Never Used    Tobacco comment: pt reports has cut back   Substance and Sexual Activity    Alcohol use: Yes     Frequency: Never     Comment: social    Drug use: No    Sexual activity: Yes     Partners: Male   Lifestyle    Physical activity     Days per week: Not on file     Minutes per session: Not on file    Stress: Not on file   Relationships    Social connections     Talks on phone: Not on file     Gets together: Not on file     Attends Spiritism service: Not on file     Active member of club or organization: Not on file     Attends meetings of clubs or organizations: Not on file     Relationship status: Not on file    Intimate partner violence     Fear of current or ex partner: Not on file     Emotionally abused: Not on file     Physically abused: Not on file     Forced sexual activity: Not on file   Other Topics Concern    Not on file   Social History Narrative    One child    Lives in house with partner    Disabled    Advanced directive information unavailable    Domestic partner    House         Review of Systems   Otherwise negative except per above and HPI  Physical Exam    Vitals  Vitals:    08/25/20 1654   Temp: 97 7 °F (36 5 °C)       BMI  Body mass index is 40 25 kg/m²  GENERAL: No acute distress  Alert and oriented  Well nourished and well hydrated  Appears stated age  Sitting on rolling walker and talking on speaker phone as I entered the room  HEENT : Normocephalic, atraumatic  Extraocular movements intact  Mask in palce  NECK: Supple, trachea midline  LUNGS: Adequate and symmetric respiratory effort  No intercostal retractions or accessory muscle use  HEART: Extremities warm and perfused  ABDOMEN: Nondistended  SKIN: Warm and dry, no rash      No knee exam as patient left prior to conclusion of visit    Imaging  A) Imaging modality available  Radiographs: yes  MRI scan: no  CT scan: no    B) Imaging findings  Subchondral cysts: no  Subchondral sclerosis: yes  Periarticular osteophytes: yes  Joint subluxation: no  Joint space narrowing: yes  Bone-on-bone articulations: no  Avascular necrosis: no      Assessment and Plan  Right Knee Arthritis    - recommend nonoperative mgmt of knee OA, inculding PT, CSI, VS   While discussing this with patient she became upset and reports she doesn't want to try any of these things and just wants to move straight to TKA  Explained to patient that this is not in line with guidelines or my practice philosophy and I would not feel comfortable moving forward with TKA>  She became upset and states she was here to talk about knee replacement and waited over 45 minutes, however, her appt was schedueld for only 25 minutes prior to my arrival in room  Patient gathered her belongings and walked out of the room  Claudia Lovett MD  Adult Reconstruction Surgery  Department of [place] [id number]  5:44 PM

## 2020-09-14 ENCOUNTER — TELEPHONE (OUTPATIENT)
Dept: OBGYN CLINIC | Facility: HOSPITAL | Age: 55
End: 2020-09-14

## 2020-09-14 NOTE — TELEPHONE ENCOUNTER
Tre Herron    Patient is needing call back about shower chair  Please return call  Said she spoke with vijay Acuña

## 2020-09-15 NOTE — TELEPHONE ENCOUNTER
Can you find out which [facility] patient is referring to? Or is it really you that she is referring to?     Thanks,  HM

## 2020-09-15 NOTE — TELEPHONE ENCOUNTER
I spoke with the patient  She is frustrated with this whole process  She explained [employer] is requiring a letter of medical necessity to be sent to them (along with the script again) warranting the shower chair for her knees  She noted she is getting a "splash defense transfer bench with water protection" chair  Once done, please fax to [phone]      Their phone is: [phone] [address] Machado)

## 2020-09-17 DIAGNOSIS — J45.30 MILD PERSISTENT ASTHMA WITHOUT COMPLICATION: ICD-10-CM

## 2020-09-17 RX ORDER — MONTELUKAST SODIUM 10 MG/1
TABLET ORAL
Qty: 90 TABLET | Refills: 0 | Status: SHIPPED | OUTPATIENT
Start: 2020-09-17

## 2020-09-17 NOTE — TELEPHONE ENCOUNTER
I'm about to sign the letter and script now  Please fax to the number that the patient wants them sent to      Thanks,  AMANDA

## 2020-09-25 DIAGNOSIS — M06.00 SERONEGATIVE EROSIVE RHEUMATOID ARTHRITIS (HCC): ICD-10-CM

## 2020-09-25 RX ORDER — PREDNISONE 1 MG/1
TABLET ORAL
Qty: 90 TABLET | Refills: 0 | Status: SHIPPED | OUTPATIENT
Start: 2020-09-25 | End: 2021-06-17

## 2020-10-27 ENCOUNTER — TELEPHONE (OUTPATIENT)
Dept: FAMILY MEDICINE CLINIC | Facility: CLINIC | Age: 55
End: 2020-10-27

## 2020-12-11 ENCOUNTER — TELEPHONE (OUTPATIENT)
Dept: FAMILY MEDICINE CLINIC | Facility: CLINIC | Age: 55
End: 2020-12-11

## 2020-12-11 DIAGNOSIS — R53.83 OTHER FATIGUE: ICD-10-CM

## 2020-12-11 DIAGNOSIS — G47.09 OTHER INSOMNIA: ICD-10-CM

## 2020-12-11 DIAGNOSIS — G47.33 OBSTRUCTIVE SLEEP APNEA (ADULT) (PEDIATRIC): Primary | ICD-10-CM

## 2020-12-19 DIAGNOSIS — M17.0 PRIMARY OSTEOARTHRITIS OF BOTH KNEES: ICD-10-CM

## 2020-12-19 DIAGNOSIS — M06.00 SERONEGATIVE EROSIVE RHEUMATOID ARTHRITIS (HCC): ICD-10-CM

## 2020-12-19 RX ORDER — HYDROXYCHLOROQUINE SULFATE 200 MG/1
TABLET, FILM COATED ORAL
Qty: 180 TABLET | Refills: 0 | Status: SHIPPED | OUTPATIENT
Start: 2020-12-19 | End: 2021-03-18

## 2020-12-20 DIAGNOSIS — J45.30 MILD PERSISTENT ASTHMA WITHOUT COMPLICATION: ICD-10-CM

## 2020-12-20 RX ORDER — MONTELUKAST SODIUM 10 MG/1
TABLET ORAL
Qty: 90 TABLET | Refills: 0 | OUTPATIENT
Start: 2020-12-20

## 2020-12-21 NOTE — TELEPHONE ENCOUNTER
I left a detailed message explaining one of the medications was refilled the other was rejected - an appt is needed  On my message I did make the patient aware Dr Malissa Mohamud is scheduling into April/May so if she is not able/willing to wait that long she can schedule with Dr Elfego Forbes

## 2020-12-22 DIAGNOSIS — M06.00 SERONEGATIVE EROSIVE RHEUMATOID ARTHRITIS (HCC): Primary | ICD-10-CM

## 2020-12-22 DIAGNOSIS — M17.0 PRIMARY OSTEOARTHRITIS OF BOTH KNEES: ICD-10-CM

## 2020-12-22 RX ORDER — IBUPROFEN 800 MG/1
800 TABLET ORAL EVERY 8 HOURS PRN
Qty: 90 TABLET | Refills: 0 | Status: SHIPPED | OUTPATIENT
Start: 2020-12-22 | End: 2021-06-10 | Stop reason: SDUPTHER

## 2020-12-22 RX ORDER — LEFLUNOMIDE 10 MG/1
TABLET ORAL
Qty: 30 TABLET | Refills: 0 | Status: SHIPPED | OUTPATIENT
Start: 2020-12-22 | End: 2021-06-10 | Stop reason: SDUPTHER

## 2021-01-06 ENCOUNTER — TELEMEDICINE (OUTPATIENT)
Dept: SLEEP CENTER | Facility: CLINIC | Age: 56
End: 2021-01-06
Payer: COMMERCIAL

## 2021-01-06 DIAGNOSIS — R68.2 DRY MOUTH: ICD-10-CM

## 2021-01-06 DIAGNOSIS — Z72.0 TOBACCO ABUSE: ICD-10-CM

## 2021-01-06 DIAGNOSIS — E66.01 MORBID OBESITY WITH BMI OF 40.0-44.9, ADULT (HCC): ICD-10-CM

## 2021-01-06 DIAGNOSIS — G47.09 OTHER INSOMNIA: ICD-10-CM

## 2021-01-06 DIAGNOSIS — F32.A ANXIETY AND DEPRESSION: ICD-10-CM

## 2021-01-06 DIAGNOSIS — G47.33 OBSTRUCTIVE SLEEP APNEA (ADULT) (PEDIATRIC): Primary | ICD-10-CM

## 2021-01-06 DIAGNOSIS — J45.30 MILD PERSISTENT ASTHMA WITHOUT COMPLICATION: ICD-10-CM

## 2021-01-06 DIAGNOSIS — F41.9 ANXIETY AND DEPRESSION: ICD-10-CM

## 2021-01-06 DIAGNOSIS — M06.00 RHEUMATOID ARTHRITIS WITH NEGATIVE RHEUMATOID FACTOR, INVOLVING UNSPECIFIED SITE (HCC): ICD-10-CM

## 2021-01-06 DIAGNOSIS — G89.4 CHRONIC PAIN DISORDER: ICD-10-CM

## 2021-01-06 DIAGNOSIS — D68.9 COAGULOPATHY (HCC): ICD-10-CM

## 2021-01-06 PROCEDURE — 99214 OFFICE O/P EST MOD 30 MIN: CPT | Performed by: INTERNAL MEDICINE

## 2021-01-06 PROCEDURE — 4004F PT TOBACCO SCREEN RCVD TLK: CPT | Performed by: INTERNAL MEDICINE

## 2021-01-06 NOTE — PROGRESS NOTES
Virtual Regular Visit      Assessment/Plan:    Problem List Items Addressed This Visit        Respiratory    Mild persistent asthma without complication       Musculoskeletal and Integument    Rheumatoid arthritis ([facility] )       Hematopoietic and Hemostatic    Coagulopathy ([facility] )       Other    Tobacco abuse      Other Visit Diagnoses     Obstructive sleep apnea (adult) (pediatric)    -  Primary    Relevant Orders    PAP DME Resupply/Reorder    Other insomnia        Dry mouth        Chronic pain disorder        Anxiety and depression        Morbid obesity with BMI of 40 0-44 9, adult ([address] )                   Reason for visit is   Chief Complaint   Patient presents with    Virtual Regular Visit        Encounter provider Celeste Zamora MD    Provider located at [facility] [address]  [address] [facility] [address] [facility],[address] [facility] [address] [phone]      Recent Visits  No visits were found meeting these conditions  Showing recent visits within past 7 days and meeting all other requirements     Today's Visits  Date Type Provider Dept   01/06/21 Telemedicine Celeste Zamora MD [facility]   Showing today's visits and meeting all other requirements     Future Appointments  No visits were found meeting these conditions  Showing future appointments within next 150 days and meeting all other requirements        The patient was identified by name and date of birth  Shoaib Agarwal was informed that this is a telemedicine visit and that the visit is being conducted through [facility] and patient was informed that this is a secure, HIPAA-compliant platform  She agrees to proceed     My office door was closed  No one else was in the room  She acknowledged consent and understanding of privacy and security of the video platform  The patient has agreed to participate and understands they can discontinue the visit at any time  Patient is aware this is a billable service       Follow-Up Note - [address] [place] [address] Drive  63 y o  female  :1965  OVW:[id number]    CC: I saw this patient for follow-up in clinic today for Sleep disordered breathing, Coexisting Sleep and Medical Problems  Patient had a split sleep study in 2019: The diagnostic portion demonstrated  : AHI of 25 per hour, considerably higher while supine and during stage REM  Intermittent snoring was noted  Minimum oxygen saturation was 87 % and she spent 8 2 minutes of this portion of the study with saturations less than 90%  During the therapeutic portion of the study, his sleep disordered breathing was adequately remediated with nasal CPAP at 6 cm H2O  Sleep efficiency improved from 41-84% of the initiating therapy  PFSH, Problem List, Medications & Allergies were reviewed in EMR  Interval changes:  Knee and musculoskeletal aches and pains  She is planned for bilateral knee replacement  She also has rheumatoid arthritis that she feels has flared up and is reporting joint pains in spite of her current medications  She  has a past medical history of Abdominal hernia, Anxiety, Arthritis, Asthma, Brain injury ([facility] ) (), COPD (chronic obstructive pulmonary disease) ([facility] ), CPAP (continuous positive airway pressure) dependence, Depression, Fatty liver, GERD (gastroesophageal reflux disease), History of gestational diabetes, History of laparoscopic adjustable gastric banding, History of recent fall, Hypertension, Knee pain, right, Obese, Pneumonia, RA (rheumatoid arthritis) ([facility] [address] ), Risk for falls, Shortness of breath, Shoulder pain, bilateral, Sleep apnea, and Vomiting      She has a current medication list which includes the following prescription(s): acetaminophen, albuterol, albuterol, budesonide-formoterol, bupropion, clotrimazole-betamethasone, diclofenac sodium, diphenhydramine, fluticasone, hydroxychloroquine, hydroxyzine hcl, ibuprofen, leflunomide, levalbuterol, montelukast, mupirocin, prednisone, spiriva respimat, spironolactone, terbinafine, triamcinolone, and valsartan-hydrochlorothiazide  Review of Systems  Genitourinary need to urinate more than twice a night   Cardiology ankle/leg swelling   Gastrointestinal none   Neurology need to move extremities, forgetfulness, poor concentration or confusion,  and difficulty with memory   Constitutional claustrophobia, fatigue and weight change   Integumentary itching   Psychiatry anxiety and depression   Musculoskeletal joint pain and sciatica   Pulmonary snoring   ENT none   Endocrine none   Hematological none       PHYSIOLOGICAL DATA REVIEW AND INTERPRETATION:   using PAP > 4 hours/night 37% of the time  Estimated DWIGHT 3 3/hour at pressure of 6cm H2O @90th percentile  She has not used the machine for 12 out of the past 30 days  But average on days used is 4 hours and 36 minutes  Compliance: poor; Sleep disordered breathing:stable    SUBJECTIVE: Regarding use of PAP, Elham reports:   · She is experiencing significant adverse effects: dry mouth/throat  · She is benefiting from use: sleeping better when able to use CPAP  Sleep Routine: She reports getting 3 hrs sleep  ; she has difficulty initiating and maintaining sleep  because of knee pain  She awakens spontaneously and never feels rested  Excessive Daytime drowsiness denied or napping  She  rated herself at Total score: 2 /24 on the Gantt sleepiness scale ]         Habits: reports that she has been smoking cigarettes  She has a 0 45 pack-year smoking history  She has never used smokeless tobacco ,  reports current alcohol use ,  reports no history of drug use , Caffeine use: limited , Exercise routine: none   EXAM: There were no vitals taken for this visit  Patient is well groomed; well appearing  Skin/Extrem: col & hydration normal; no edema  Psych: cooperativeand in no distress  Mental state:  Appears anxious  CNS: Alert, orientated, clear & coherent speech    H&N: EOMI; NC/AT:no facial pressure marks, no rashes  Respiratory effort is normal    IMPRESSION: Primary Sleep/Secondary(to Medical or Psych conditions) & comorbidities   1  Obstructive sleep apnea (adult) (pediatric)  Ambulatory referral to Sleep Medicine    PAP DME Resupply/Reorder   2  Other insomnia  Ambulatory referral to Sleep Medicine   3  Dry mouth     4  Chronic pain disorder     5  Anxiety and depression     6  Mild persistent asthma without complication     7  Tobacco abuse     8  Rheumatoid arthritis with negative rheumatoid factor, involving unspecified site ([place] [address] )     9  Coagulopathy ([place] [address] )     10  Morbid obesity with BMI of 40 0-44 9, adult ([place] [address] )       PLAN:  1  I reviewed results of prior studies and physiologic data with the patient  I discussed treatment options with risks and benefits  2  Treatment with  PAP is medically necessary and Izzy Davis is agreable to continue use  3  Care of equipment, methods to improve comfort using PAP and importance of compliance with therapy were discussed  4  Pressure setting: continue 6 cmH2O     5  Rx provided to replace supplies and Care coordinated with DME provider  6  Pain, poor sleep hygiene and mood disturbance underlie her sleep difficulties  Sleep disordered breathing is well controlled as long as she uses her CPAP  7  Strategies to improve dry mouth were discussed  Specifically, adequate treatment of nasal allergies, adjusting heat and humidity settings on PAP machine, using Biotene mouthwash &/or Xylimelt  8  Cognitive behavioral therapy was continued, Sleep Hygiene and behavioral techniques to manage Insomnia were discussed  9  She is on medication for pain, anxiety and depression but it appears medications may need to be reviewed  She will follow up with care team in this regard  10  I advised smoking cessation especially for the 2 weeks preceding her surgery and discussed  strategies for weight reduction  11   She was instructed to carry her CPAP machine for use perioperatively  12  Follow-up is advised in 1 year or sooner if needed to monitor progress, compliance and to adjust therapy  Thank you for allowing me to participate in the care of this patient  Sincerely,    Authenticated electronically by Musa Ramachandran MD on 76/28/41   Board Certified Specialist     I spent 20 minutes directly with the patient during this visit      VIRTUAL VISIT DISCLAIMER    Julissa Khoury acknowledges that she has consented to an online visit or consultation  She understands that the online visit is based solely on information provided by her, and that, in the absence of a face-to-face physical evaluation by the physician, the diagnosis she receives is both limited and provisional in terms of accuracy and completeness  This is not intended to replace a full medical face-to-face evaluation by the physician  Julissa Khoury understands and accepts these terms

## 2021-01-06 NOTE — PATIENT INSTRUCTIONS

## 2021-01-07 ENCOUNTER — TELEPHONE (OUTPATIENT)
Dept: SLEEP CENTER | Facility: CLINIC | Age: 56
End: 2021-01-07

## 2021-01-14 DIAGNOSIS — M17.0 PRIMARY OSTEOARTHRITIS OF BOTH KNEES: ICD-10-CM

## 2021-01-14 RX ORDER — IBUPROFEN 800 MG/1
800 TABLET ORAL EVERY 8 HOURS PRN
Qty: 90 TABLET | Refills: 0 | OUTPATIENT
Start: 2021-01-14

## 2021-01-16 DIAGNOSIS — J45.30 MILD PERSISTENT ASTHMA WITHOUT COMPLICATION: ICD-10-CM

## 2021-01-17 RX ORDER — MONTELUKAST SODIUM 10 MG/1
TABLET ORAL
Qty: 90 TABLET | Refills: 0 | OUTPATIENT
Start: 2021-01-17

## 2021-02-16 NOTE — TELEPHONE ENCOUNTER

## 2021-02-26 ENCOUNTER — TELEPHONE (OUTPATIENT)
Dept: FAMILY MEDICINE CLINIC | Facility: CLINIC | Age: 56
End: 2021-02-26

## 2021-03-14 DIAGNOSIS — J45.30 MILD PERSISTENT ASTHMA WITHOUT COMPLICATION: ICD-10-CM

## 2021-03-14 RX ORDER — LEVALBUTEROL TARTRATE 45 UG/1
1-2 AEROSOL, METERED ORAL EVERY 4 HOURS PRN
Qty: 15 INHALER | Refills: 5 | OUTPATIENT
Start: 2021-03-14

## 2021-03-18 DIAGNOSIS — M06.00 SERONEGATIVE EROSIVE RHEUMATOID ARTHRITIS (HCC): ICD-10-CM

## 2021-03-18 RX ORDER — HYDROXYCHLOROQUINE SULFATE 200 MG/1
200 TABLET, FILM COATED ORAL 2 TIMES DAILY
Qty: 60 TABLET | Refills: 3 | Status: SHIPPED | OUTPATIENT
Start: 2021-03-18 | End: 2021-06-10 | Stop reason: ALTCHOICE

## 2021-03-18 NOTE — TELEPHONE ENCOUNTER
I spoke with the patient, information provided to patient  She indicated she is going for TKR on Monday and then 6 weeks later she's having the other knee done so she wont be able to come in right away  I explained she gave a qty 60 with 3 refills so that should cover her for a little while  Told her to get through her surgery then call us to schedule the follow up

## 2021-06-01 ENCOUNTER — TELEPHONE (OUTPATIENT)
Dept: DERMATOLOGY | Facility: CLINIC | Age: 56
End: 2021-06-01

## 2021-06-04 ENCOUNTER — TELEPHONE (OUTPATIENT)
Dept: DERMATOLOGY | Facility: CLINIC | Age: 56
End: 2021-06-04

## 2021-06-07 ENCOUNTER — OFFICE VISIT (OUTPATIENT)
Dept: DERMATOLOGY | Facility: CLINIC | Age: 56
End: 2021-06-07
Payer: COMMERCIAL

## 2021-06-07 ENCOUNTER — APPOINTMENT (OUTPATIENT)
Dept: LAB | Facility: CLINIC | Age: 56
End: 2021-06-07
Payer: COMMERCIAL

## 2021-06-07 VITALS — TEMPERATURE: 97.5 F

## 2021-06-07 DIAGNOSIS — L40.9 PSORIASIS: Primary | ICD-10-CM

## 2021-06-07 DIAGNOSIS — L40.9 PSORIASIS: ICD-10-CM

## 2021-06-07 DIAGNOSIS — Z51.81 MEDICATION MONITORING ENCOUNTER: ICD-10-CM

## 2021-06-07 PROCEDURE — 99213 OFFICE O/P EST LOW 20 MIN: CPT | Performed by: DERMATOLOGY

## 2021-06-07 PROCEDURE — 86480 TB TEST CELL IMMUN MEASURE: CPT

## 2021-06-07 PROCEDURE — 87340 HEPATITIS B SURFACE AG IA: CPT

## 2021-06-07 PROCEDURE — 86704 HEP B CORE ANTIBODY TOTAL: CPT

## 2021-06-07 PROCEDURE — 86803 HEPATITIS C AB TEST: CPT

## 2021-06-07 PROCEDURE — 86706 HEP B SURFACE ANTIBODY: CPT

## 2021-06-07 PROCEDURE — 36415 COLL VENOUS BLD VENIPUNCTURE: CPT

## 2021-06-07 NOTE — PROGRESS NOTES
[facility] Follow Up Note    Patient Name: Nahun Ventura  Encounter Date: 06/07/21    Today's Chief Concerns:  Beata Belch Concern #1:  Follow up      Current Medications:    Current Outpatient Medications:     acetaminophen (TYLENOL) 650 mg CR tablet, Take 650 mg by mouth every 12 (twelve) hours, Disp: , Rfl:     albuterol (2 5 mg/3 mL) 0 083 % nebulizer solution, Take 1 vial (2 5 mg total) by nebulization every 6 (six) hours as needed for wheezing, Disp: 150 mL, Rfl: 3    albuterol (PROVENTIL HFA,VENTOLIN HFA) 90 mcg/act inhaler, Inhale 2 puffs 4 (four) times a day As directed, Disp: 3 Inhaler, Rfl: 1    budesonide-formoterol (SYMBICORT) 160-4 5 mcg/act inhaler, Inhale 2 puffs 2 (two) times a day, Disp: , Rfl:     buPROPion (WELLBUTRIN SR) 150 mg 12 hr tablet, 2 am, 1 pm, Disp: 90 tablet, Rfl: 5    clotrimazole-betamethasone (LOTRISONE) 1-0 05 % cream, Apply topically 2 (two) times a day (Patient not taking: Reported on 12/9/2019), Disp: 30 g, Rfl: 0    diclofenac sodium (VOLTAREN) 1 %, Apply 4 g topically 4 (four) times a day as needed (pain), Disp: 300 g, Rfl: 6    diphenhydrAMINE (BENADRYL) 50 mg capsule, Take 50 mg by mouth every 6 (six) hours as needed, Disp: , Rfl:     fluticasone (FLONASE) 50 mcg/act nasal spray, 1 spray into each nostril as needed  , Disp: , Rfl:     hydroxychloroquine (PLAQUENIL) 200 mg tablet, Take 1 tablet (200 mg total) by mouth 2 (two) times a day, Disp: 60 tablet, Rfl: 3    hydrOXYzine HCL (ATARAX) 25 mg tablet, TAKE 1 TABLET (25 MG TOTAL) BY MOUTH EVERY 6 (SIX) HOURS AS NEEDED FOR ITCHING (Patient not taking: Reported on 7/28/2020), Disp: 120 tablet, Rfl: 0    ibuprofen (MOTRIN) 800 mg tablet, TAKE 1 TABLET (800 MG TOTAL) BY MOUTH EVERY 8 (EIGHT) HOURS AS NEEDED FOR MILD PAIN OR MODERATE PAIN, Disp: 90 tablet, Rfl: 0    leflunomide (ARAVA) 10 MG tablet, TAKE 1 TABLET BY MOUTH EVERY DAY, Disp: 30 tablet, Rfl: 0    levalbuterol (XOPENEX HFA) 45 mcg/act inhaler, Inhale 1-2 puffs every 4 (four) hours as needed for wheezing, Disp: 1 Inhaler, Rfl: 5    montelukast (SINGULAIR) 10 mg tablet, TAKE 1 TABLET BY MOUTH DAILY AT BEDTIME, Disp: 90 tablet, Rfl: 0    mupirocin (BACTROBAN) 2 % ointment, Apply twice daily to open sores, Disp: 22 g, Rfl: 3    predniSONE 1 mg tablet, TAKE 3 TABLETS BY MOUTH EVERY DAY, Disp: 90 tablet, Rfl: 0    SPIRIVA RESPIMAT 1 25 MCG/ACT AERS inhaler, INHALE 2 INHALATIONS BY MOUTH DAILY (Patient not taking: Reported on 12/9/2019), Disp: 3 Inhaler, Rfl: 1    spironolactone (ALDACTONE) 25 mg tablet, Take 25 mg by mouth as needed, Disp: , Rfl:     terbinafine (LAMISIL AT) 1 % cream, Apply topically to feet twice a day, Disp: 60 g, Rfl: 3    triamcinolone (KENALOG) 0 1 % ointment, APPLY TOPICALLY TWICE A DAY FOR 2 WEEKS, Disp: 454 g, Rfl: 0    valsartan-hydrochlorothiazide (DIOVAN-HCT) 160-25 MG per tablet, Take 1 tablet by mouth daily, Disp: 90 tablet, Rfl: 1    CONSTITUTIONAL:   There were no vitals filed for this visit  Specific Alerts:    Have you been seen by a [facility] D[facility]atologist in the last 3 years? YES    Are you pregnant or planning to become pregnant? No    Are you currently or planning to be nursing or breast feeding? No    Allergies   Allergen Reactions    Sulfasalazine Rash     rash    Other Rash     Adhesive tape and adhesive bandaids    Wound Dressing Adhesive Other (See Comments)     rash, itchy    Adalimumab Itching and Rash       May we call your Preferred Phone number to discuss your specific medical information? YES    May we leave a detailed message that includes your specific medical information? YES    Have you traveled outside of the [facility] in the past 3 months? No    Do you currently have a pacemaker or defibrillator?  No    Do you have any artificial heart valves, joints, plates, screws, rods, stents, pins, etc? YES, Bilateral knee replacement; 03/22/21, 05/2021     Do you require any medications prior to a surgical procedure? No    Are you taking any medications that cause you to bleed more easily ("blood thinners") No    Have you ever experienced a rapid heartbeat with epinephrine? No    Review of Systems:  Have you recently had or currently have any of the following?     · Fever or chills: No  · Night Sweats: No  · Headaches: No  · Weight Gain: No  · Weight Loss: No  · Blurry Vision: No  · Nausea: No  · Vomiting: No  · Diarrhea: No  · Blood in Stool: No  · Abdominal Pain: No  · Itchy Skin: YES  · Painful Joints: No  · Swollen Joints: No  · Muscle Pain: No  · Irregular Mole: No  · Sun Burn: No  · Dry Skin: YES  · Skin Color Changes: No  · Scar or Keloid: No  · Cold Sores/Fever Blisters: No  · Bacterial Infections/MRSA: No  · Anxiety: YES  · Depression: YES  · Suicidal or Homicidal Thoughts: No      PSYCH: Normal mood and affect  EYES: Normal conjunctiva  ENT: Normal lips and oral mucosa  CARDIOVASCULAR: No edema  RESPIRATORY: Normal respirations  HEME/LYMPH/IMMUNO:  No regional lymphadenopathy except as noted below in ASSESSMENT AND PLAN BY DIAGNOSIS    FULL ORGAN SYSTEM SKIN EXAM (SKIN)   Hair, Scalp, Ears, Face Normal except as noted below in Assessment   Neck, Cervical Chain Nodes Normal except as noted below in Assessment   Right Arm/Hand/Fingers Normal except as noted below in Assessment   Left Arm/Hand/Fingers Normal except as noted below in Assessment   Chest/Breasts/Axillae Viewed areas Normal except as noted below in Assessment   Abdomen, Umbilicus Normal except as noted below in Assessment   Back/Spine Normal except as noted below in Assessment   Groin/Genitalia/Buttocks Viewed areas Normal except as noted below in Assessment   Right Leg, Foot, Toes Normal except as noted below in Assessment   Left Leg, Foot, Toes Normal except as noted below in Assessment       PSORIASIS    Physical Exam:   Anatomic Location Affected:  Bilateral lower extremities, trunk, buttocks, nails  ( all finger nails )    Morphological Description:  Well defines erryhtmeatous palques qith scale, fingernails with pits    Severity: severe   Body Percent Affected: 20 %    Additional History of Present Condition:   Patient is present to follow up rash; patient reports taking an " itch Pill" , has uses nystatin cream under breast and groin area, but moisture is irritating it  Denies, Multiple sclerosis, congestive heart failure, malignancy  She was on humira before for rheumatoid arthritis and broke out in a rash - does not know what the rash was called or treated with     Assessment and Plan:  Based on a thorough discussion of this condition and the management approach to it (including a comprehensive discussion of the known risks, side effects and potential benefits of treatment), the patient (family) agrees to implement the following specific plan:   We will work on prior authorization for [employer] 150 mg  at week 0 week 4 and every 3 months, once approved we will contact you     Follow up once injection is received to provide education on administration   Must complete bloodwork prior to medication approval     - Educated the patient regarding diagnosis and discussed that psoriasis is a chronic, intermittently relapsing inflammatory disease driven by aberrant T-cells  - Discussed CV risk associated with psoriasis and counseled on regular follow ups with PCP and on importance of diet and exercise  - Check Quantiferon-TB gold, HCV Ab, HBVsAg, HBVsAb, HBVcAb  Reviewed CMP and CBC on file   - side effects reviewed including hypersensitivity reaction, increased infection, immunosuppression and theoretical increase in cancers   - patient knows not to get live vaccines  Immunizations are up to date  Plans to get CDC/ACIP recommended immunizations  Again, no live vaccines     - Indications for treatment include: failure of topical steroids; phototherapy (patient lacks ability to come to clinic 3 times a week for treatment), humira   - Patient cannot be placed on oral systemic immunosuppressants due to a number of reasons:   - Methotrexate contraindicated due to obesity   (see Methotrexate and Psoriasis: 2009 [address])   - Cyclosporine contraindicated due to frequent lab monitoring, multiple drug-drug interactions, chronic hypertension  - Acitretin contraindicated due to alcohol use and it's conversion to etretinate in the presence of alcohol       - This biologic is being chosen for its efficacy, mechanism of action and preferred safety profile over other biologics  Secukinumab and other IL-17A antibodies have been linked to inflammatory bowel disease risk as well as increased tinea infections  Humira and other TNF-alpha inhibitors have a worse side effect profile including congestive heart failure, demyelinating diseases, autoimmune hepatitis and flares of pustular psoriasis, among others     - Patient will not be on any other DMARDs or biologics while on skyrizi   - Start Skyrizi 150 mg at week 0, 4, then every 12 weeks thereafter          Yael Age    I,:  Ritu Kent MA am acting as a scribe while in the presence of the attending physician :       I,:  Citlaly Gibbs MD personally performed the services described in this documentation    as scribed in my presence :

## 2021-06-07 NOTE — PATIENT INSTRUCTIONS
Assessment and Plan:  Based on a thorough discussion of this condition and the management approach to it (including a comprehensive discussion of the known risks, side effects and potential benefits of treatment), the patient (family) agrees to implement the following specific plan:   We will work on prior authorization for [employer] 150 mg  at week 0 week 4 and every 3 months, once approved we will contact you     Follow up once injection is received to provide education on administration   Must complete bloodwork prior to medication approval     Psoriasis is a chronic inflammatory condition that causes the body to make new skin cells in days rather than weeks  As these cells pile up on the surface of the skin, you may see thick, scaly patches of thickened skin  Psoriasis affects 2-4% of males and females  It can start at any age including childhood, with peaks of onset at 15-25 years and 50-60 years  It tends to persist lifelong, fluctuating in extent and severity  It is particularly common in Caucasians but may affect people of any race  About one-third of patients with psoriasis have family members with psoriasis  Psoriasis is multifactorial  It is classified as an immune-mediated inflammatory disease (IMID)  Genetic factors are important and influence the type of psoriasis and response to treatment  What are the signs and symptoms of psoriasis? There are many different types of psoriasis that each have present uniquely  The types of psoriasis include:    Plaque psoriasis: About 80% to 90% of people who have psoriasis develop this type  When plaque psoriasis appears, you may see:  Plaque psoriasis usually presents with symmetrically distributed, red, scaly plaques with well-defined edges  The scale is typically silvery white, except in skin folds where the plaques often appear shiny and they may have a moist peeling surface   The most common sites are scalp, elbows and knees, but any part of the skin can be involved  The plaques are usually very persistent without treatment  Itch is mostly mild but may be severe in some patients, leading to scratching and lichenification (thickened leathery skin with increased skin markings)  Painful skin cracks or fissures may occur  When psoriatic plaques clear up, they may leave brown or pale marks that can be expected to fade over several months  Guttate psoriasis: When someone gets this type of psoriasis, you often see tiny bumps appear on the skin quite suddenly  The bumps tend to cover much of the torso, legs, and arms  Sometimes, the bumps also develop on the face, scalp, and ears  No matter where they appear, the bumps tend to be:    Small and scaly   [place]-colored to pink   Temporary, clearing in a few weeks or months without treatment  When guttate psoriasis clears, it may never return  Why this happens is still a bit of a mystery  Guttate psoriasis tends to develop in children and young adults who've had an infection, such as strep throat  It's possible that when the infection clears so does guttate psoriasis  It's also possible to have:   Guttate psoriasis for life   See the guttate psoriasis clear and plaque psoriasis develop later in life   Plaque psoriasis when you develop guttate psoriasis  There's no way to predict what will happen after the first flare-up of guttate psoriasis clears  Inverse psoriasis: This type of psoriasis develops in areas where skin touches skin, such as the armpits, genitals, and crease of the buttocks  Where the inverse psoriasis appears, you're likely to notice:   Smooth, red patches of skin that look raw   Little, if any, silvery-white coating   Sore or painful skin  Other names for this type of psoriasis are intertriginous psoriasis or flexural psoriasis  Pustular psoriasis: This type of psoriasis causes pus-filled bumps that usually appear only on the feet and hands   While the pus-filled bumps may look like an infection, the skin is not infected  The bumps don't contain bacteria or anything else that could cause an infection  Where pustular psoriasis appears, you tend to notice:   Red, swollen skin that is dotted with pus-filled bumps   Extremely sore or painful skin   Brown dots (and sometimes scale) appear as the pus-filled bumps dry  Pustular psoriasis can make just about any activity that requires your hands or feet, such as typing or walking, unbearably painful  Pustular psoriasis (generalized): Serious and life-threatening, this rare type of psoriasis causes pus-filled bumps to develop on much of the skin  Also called von Zumbusch psoriasis, a flare-up causes this sequence of events:  1  Skin on most of the body suddenly turns dry, red, and tender  2  Within hours, pus-filled bumps cover most of the skin  3  Often within a day, the pus-filled bumps break open and pools of pus leak onto the skin  4  As the pus dries (usually within 24 to 48 hours), the skin dries out and peels (as shown in this picture)  5  When the dried skin peels off, you see a smooth, glazed surface  6  In a few days or weeks, you may see a new crop of pus-filled bumps covering most of the skin, as the cycle repeats itself  Anyone with pustular psoriasis also feels very sick, and may develop a fever, headache, muscle weakness, and other symptoms  Medical care is often necessary to save the person's life  Erythrodermic psoriasis: Serious and life-threatening, this type of psoriasis requires immediate medical care  When someone develops erythrodermic psoriasis, you may notice:   Skin on most of the body looks burnt   Chills, fever, and the person looks extremely ill   Muscle weakness, a rapid pulse, and severe itch  The person may also be unable to keep warm, so hypothermia can set in quickly  Most people who develop this type of psoriasis already have another type of psoriasis   Before developing erythrodermic psoriasis, they often notice that their psoriasis is worsening or not improving with treatment  If you notice either of these happening, see a board-certified dermatologist     Nails    Nail psoriasis: With any type of psoriasis, you may see changes to your fingernails or toenails  About half of the people who have plaque psoriasis see signs of psoriasis on their fingernails at some point2  When psoriasis affects the nails, you may notice:   Tiny dents in your nails (called nail pits)   White, yellow, or brown discoloration under one or more nails   Crumbling, rough nails   A nail lifting up so that it's no longer attached   Buildup of skin cells beneath one or more nails, which lifts up the nail  Treatment and proper nail care can help you control nail psoriasis  Psoriatic arthritis: If you have psoriasis, it's important to pay attention to your joints  Some people who have psoriasis develop a type of arthritis called psoriatic arthritis  This is more likely to occur if you have severe psoriasis  Most people notice psoriasis on their skin years before they develop psoriatic arthritis  It's also possible to get psoriatic arthritis before psoriasis, but this is less common  When psoriatic arthritis develops, the signs can be subtle  At first, you may notice:   A swollen and tender joint, especially in a finger or toe   Heel pain   Swelling on the back of your leg, just above your heel   Stiffness in the morning that fades during the day  Like psoriasis, psoriatic arthritis cannot be cured  Treatment can prevent psoriatic arthritis from worsening, which is important  Allowed to progress, psoriatic arthritis can become disabling  Diagnosis and treatment of psoriasis   Psoriasis is usually diagnosed by clinical features, and skin biopsy if necessary  It is important to decrease factors that aggravate psoriasis   These include treating streptococcal infections, minimizing skin injuries, avoiding sun exposure if it exacerbates psoriasis, smoking, alcohol usage, decreasing stress, and maintaining an optimal body weight  Certain medications such as lithium, beta blockers, antimalarials, and NSAIDs have also been implicated  Suddenly stopping oral steroids or strong topical steroids can cause rebound disease  There are many categories of psoriasis treatments available  Topical therapy  Mild psoriasis is generally treated with topical agents alone  Which treatment is selected may depend on body site, extent and severity of psoriasis   Emollients   Coal tar preparations   Dithranol   Salicylic acid   Vitamin D analogue (calcipotriol)   Topical corticosteroids   Calcineurin inhibitor (tacrolimus, pimecrolimus)  Phototherapy  Most psoriasis centres offer phototherapy with ultraviolet (UV) radiation, often in combination with topical or systemic agents  Types of phototherapy include   Narrowband UVB   Broadband UVB   Photochemotherapy (PUVA)   Targeted phototherapy  Systemic therapy  Moderate to severe psoriasis warrants treatment with a systemic agent and/or phototherapy  The most common treatments are:   Methotrexate   Ciclosporin   Acitretin  Other medicines occasionally used for psoriasis include:   Mycophenolate   Apremilast   Hydroxyurea   Azathioprine   6-mercaptopurine  Systemic corticosteroids are best avoided due to a risk of severe withdrawal flare of psoriasis and adverse effects  Biologics or targeted therapies are reserved for conventional treatment-resistant severe psoriasis, mainly because of expense, as side effects compare favorably with other systemic agents  These include:   Anti-tumour necrosis factor-alpha antagonists (anti-TNF?) infliximab, adalimumab and etanercept   The interleukin (IL)-12/23 antagonist ustekinumab   IL-17 antagonists such as secukinumab  Many other monoclonal antibodies are under investigation in the treatment of psoriasis

## 2021-06-08 LAB
HBV CORE AB SER QL: NORMAL
HBV SURFACE AB SER-ACNC: 6.55 MIU/ML
HBV SURFACE AG SER QL: NORMAL
HCV AB SER QL: NORMAL

## 2021-06-09 LAB
GAMMA INTERFERON BACKGROUND BLD IA-ACNC: 0.01 IU/ML
M TB IFN-G BLD-IMP: NEGATIVE
M TB IFN-G CD4+ BCKGRND COR BLD-ACNC: 0.01 IU/ML
M TB IFN-G CD4+ BCKGRND COR BLD-ACNC: 0.01 IU/ML
MITOGEN IGNF BCKGRD COR BLD-ACNC: >10 IU/ML

## 2021-06-10 ENCOUNTER — OFFICE VISIT (OUTPATIENT)
Dept: RHEUMATOLOGY | Facility: CLINIC | Age: 56
End: 2021-06-10
Payer: COMMERCIAL

## 2021-06-10 VITALS
BODY MASS INDEX: 36.93 KG/M2 | SYSTOLIC BLOOD PRESSURE: 109 MMHG | WEIGHT: 195.6 LBS | HEIGHT: 61 IN | TEMPERATURE: 97.6 F | HEART RATE: 88 BPM | DIASTOLIC BLOOD PRESSURE: 71 MMHG

## 2021-06-10 DIAGNOSIS — M17.0 PRIMARY OSTEOARTHRITIS OF BOTH KNEES: ICD-10-CM

## 2021-06-10 DIAGNOSIS — M06.00 SERONEGATIVE EROSIVE RHEUMATOID ARTHRITIS (HCC): ICD-10-CM

## 2021-06-10 DIAGNOSIS — L40.9 PSORIASIS: ICD-10-CM

## 2021-06-10 DIAGNOSIS — Z79.899 HIGH RISK MEDICATION USE: ICD-10-CM

## 2021-06-10 DIAGNOSIS — L40.50 PSORIATIC ARTHRITIS (HCC): Primary | ICD-10-CM

## 2021-06-10 PROCEDURE — 1036F TOBACCO NON-USER: CPT | Performed by: INTERNAL MEDICINE

## 2021-06-10 PROCEDURE — 3008F BODY MASS INDEX DOCD: CPT | Performed by: INTERNAL MEDICINE

## 2021-06-10 PROCEDURE — 99215 OFFICE O/P EST HI 40 MIN: CPT | Performed by: INTERNAL MEDICINE

## 2021-06-10 RX ORDER — CLOBETASOL PROPIONATE
POWDER (GRAM) MISCELLANEOUS 2 TIMES DAILY
Qty: 100 G | Refills: 3 | Status: SHIPPED | OUTPATIENT
Start: 2021-06-10 | End: 2021-06-18 | Stop reason: SDUPTHER

## 2021-06-10 RX ORDER — TRAMADOL HYDROCHLORIDE 50 MG/1
50-100 TABLET ORAL EVERY 6 HOURS PRN
COMMUNITY
Start: 2021-05-25 | End: 2022-03-14 | Stop reason: ALTCHOICE

## 2021-06-10 RX ORDER — CLOBETASOL PROPIONATE 0.5 MG/G
OINTMENT TOPICAL 2 TIMES DAILY
Qty: 60 G | Refills: 3 | Status: SHIPPED | OUTPATIENT
Start: 2021-06-10 | End: 2021-11-02 | Stop reason: SDUPTHER

## 2021-06-10 RX ORDER — LEFLUNOMIDE 10 MG/1
10 TABLET ORAL DAILY
Qty: 30 TABLET | Refills: 3 | Status: SHIPPED | OUTPATIENT
Start: 2021-06-10 | End: 2021-11-02 | Stop reason: SDUPTHER

## 2021-06-10 RX ORDER — OXYCODONE HYDROCHLORIDE 10 MG/1
10-15 TABLET ORAL EVERY 6 HOURS PRN
COMMUNITY
Start: 2021-06-04 | End: 2021-06-14

## 2021-06-10 RX ORDER — METHOCARBAMOL 750 MG/1
750 TABLET, FILM COATED ORAL EVERY 6 HOURS PRN
COMMUNITY
Start: 2021-03-24 | End: 2022-03-14 | Stop reason: ALTCHOICE

## 2021-06-10 RX ORDER — IBUPROFEN 800 MG/1
800 TABLET ORAL EVERY 8 HOURS PRN
Qty: 90 TABLET | Refills: 3 | Status: SHIPPED | OUTPATIENT
Start: 2021-06-10 | End: 2021-06-17 | Stop reason: ALTCHOICE

## 2021-06-10 NOTE — PROGRESS NOTES
Assessment and Plan: Celestine Stover is a 63 y o  female who presents for follow-up of her seronegative RA  With patient's recently active psoriasis, it is now apparent that she actually has psoriatic arthritis rather than seronegative RA, especially since her x-rays reveal inflammatory joint changes at her DIPs, which is much more common in psoriatic arthritis than RA  Patient would be a good candidate for Taltz to help both her extensive psoriasis as well as arthritis symptoms; it would be 160mg once followed by 80mg every 4 weeks  She had a severe rash allergic reaction to Humira in the past, so is hesitant to try any other TNF inhibitors at this time  Has had allergic reactions of rash to methotrexate sulfasalazine in the past  Restarted leflunomide 10mg po daily while awaiting Taltz approval; do not believe leflunomide alone can help resolved patient's extensive skin psoriasis  Has gotten recent viral hepatitis and TB quantiferon tests  Can use clobetasol ointment or powder as needed for psoriasis  Take 2,000 units a day of Vit  D over the counter  Can take diclofenac and diclofenac gel as needed for joint pain  Orthopedics referral made for patient to re-establish with Dr Ventura Darby; is s/p bilateral knee replacement at [facility] [place] [place], but patient wants to follow-up with [facility]  Do routine monitoring labs in a month    Plan:  Diagnoses and all orders for this visit:    Psoriatic arthritis ([address] )  -     CBC and differential  -     Comprehensive metabolic panel  -     C-reactive protein  -     Sedimentation rate, automated    Primary osteoarthritis of both knees  -     Ambulatory referral to Orthopedic Surgery; Future  -     ibuprofen (MOTRIN) 800 mg tablet; Take 1 tablet (800 mg total) by mouth every 8 (eight) hours as needed for mild pain or moderate pain  -     Diclofenac Sodium (VOLTAREN) 1 %;  Apply 2 g topically 4 (four) times a day    Seronegative erosive rheumatoid arthritis (HCC)  -     leflunomide (ARAVA) 10 MG tablet; Take 1 tablet (10 mg total) by mouth daily  -     Diclofenac Sodium (VOLTAREN) 1 %; Apply 2 g topically 4 (four) times a day    Psoriasis  -     clobetasol (TEMOVATE) 0 05 % ointment; Apply topically 2 (two) times a day  -     Clobetasol Propionate POWD; Use 2 (two) times a day    High risk medication use  -     CBC and differential  -     Comprehensive metabolic panel    Other orders  -     oxyCODONE (ROXICODONE) 10 MG TABS; Take 10-15 mg by mouth every 6 (six) hours as needed  -     methocarbamol (ROBAXIN) 750 mg tablet; Take 750 mg by mouth every 6 (six) hours as needed  -     traMADol (ULTRAM) 50 mg tablet; Take  mg by mouth every 6 (six) hours as needed    High risk medication use - Benefits and risks of leflunomide use, including but not limited to gastrointestinal disturbances such as nausea, diarrhea, stomatitis, hair loss, fatigue, leukopenia, and hepatotoxicity were discussed with the patient  CBC, CMP will be monitored regularly  Follow-up plan: Return to clinic in 3 months         Rheumatic Disease Summary:  Li Gonzales a 63 y o  female who originally presented on 9/16/19 as a Rheumatology consult referred by her Joan Alonzo MD for evaluation and management of her seronegative Rheumatoid arthritis  Patient had a DAS28 score of 3 32, consistent with moderate disease activity  She was having active inflammatory arthritis despite being on prednisone 10mg po daily, which she cannot stay on long-term without being tapered  Patient had already been tried on methotrexate and sulfasalazine DMARD therapy, both of which caused patient to develop a rash  It was deemed appropriate to pursue treatment with a biologic medication such as Humira to better control her disease, though this took several weeks to get approved  Vit  D level was low, which was being supplemented   DEXA scan returned normal  Prescribed diclofenac gel to apply to painful knees and shoulders as needed for both her RA and OA  Ordered bilateral hand and feet x-rays to obtain patient's latest baseline, which revealed an erosion at the 3rd DIP joint on the right hand, which is an atypical location for RA  Since patient had a history of miscarriage, ordered an antiphospholipid panel, which returned unremarkable       She presented for follow-up on 10/29/19, at which time she had not yet started on Humira, and her RA symptoms were overall stable  She was encouraged to go ahead and start Humira while starting to taper her prednisone by 1mg every 2 weeks  Also had started patient on the DMARD hydroxychloroquine 200mg po bid to work in conjunction with Humira to control her RA symptoms  She was to continue her ergocalciferol 50,000 units po weekly for her Vit  D deficiency until her Vit  D level was repeated  She was to continue to apply diclofenac gel as needed for her knee osteoarthritis-related pain       She then presented for follow-up on 12/10/19 as an urgent clinic visit after being managed at the [facility] ED for an extensive rash that she developed as an allergic reaction to Humira, which had significantly improved upon the medication's discontinuation and increased prednisone course  Patient's ANCA and MS-3 were slightly positive 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  Besides rash, which had resolved, patient had no signs or symptoms of a vasculitis, such as kidney or lung involvement  Had decided to hold off initiating any further DMARD or biologic therapy for patient's seronegative RA  Asked patient to increase her prednisone to 30mg po daily for a week, then decrease to 20mg po daily for a week, then 15mg po daily for a week, then 10mg po daily for a week, then go down by 1mg every two weeks as tolerated  She was to continue ergocalciferol 50,000 units po weekly for her Vit  D deficiency   Ordered repeat ANCA and the muscle enzymes CK and aldolase; muscle enzymes returned normal, and her atypical pANCA returned slightly positive at 1:80, though her regular C- and P-ANCA returned negative, as well as MPO and MO-3 returned negative, making it more likely that the patient 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  She next presented for follow-up on 5/18/20 via telemedicine for her seronegative Rheumatoid arthritis  She had a rash to methotrexate, sulfasalazine, and Humira so far  Was considering starting patient on leflunomide 10mg po daily for DMARD therapy, so that her prednisone could be tapered down in the future  Ordered baseline CBC, CMP prior to starting per patient's request, and ESR, CRP for disease activity monitoring  She was to continue prednisone 10mg po daily for time-being, and diclofenac gel as needed for joint pain  8/6/20: Her right shoulder has been bothering her as well as her right knee, in which she has OA  She can restart taking HCQ 200mg po bid, since she didn't actually have a reaction to it in the past, rather it was Humira that she developed a severe rash to  She can use diclofenac gel or ibuprofen for knee pain as needed (asked her to not use them at the same time)  She is to continue 2,000 units a day of Vit  D for low Vit  D  Also, she is to contact Orthopedics regarding possible knee replacement  HPI   Celestine Stover is a 63 y o   female who presents for follow-up of her seronegative RA  Patient had bilateral knee replacement recently at [facility]; did not like the experience, wants to go back to Dr Ventura Darby at [place] [id number] who was on maternity leave when patient was pursuing surgery  Her psoriasis has become very extensive, involving her legs, arms, abdomen, and back  Has seen Dermatology, who believes she should be started on biologic treatment  Was only on leflunomide for a month before she self-discontinued it  She doesn't remember restarting hydroxychloroquine      The following portions of the patient's history were reviewed and updated as appropriate: allergies, current medications, past family history, past medical history, past social history, past surgical history and problem list     Review of Systems:   Review of Systems   Constitutional: Negative for fatigue and unexpected weight change  HENT: Negative for mouth sores  Respiratory: Negative for cough and shortness of breath  Gastrointestinal: Negative for constipation and diarrhea  Musculoskeletal: Positive for arthralgias and joint swelling  Negative for back pain and myalgias  Skin: Positive for rash  Negative for color change  Neurological: Negative for weakness  Psychiatric/Behavioral: Negative for hallucinations and sleep disturbance         Home Medications:     Current Outpatient Medications:     acetaminophen (TYLENOL) 650 mg CR tablet, Take 650 mg by mouth every 12 (twelve) hours, Disp: , Rfl:     albuterol (2 5 mg/3 mL) 0 083 % nebulizer solution, Take 1 vial (2 5 mg total) by nebulization every 6 (six) hours as needed for wheezing, Disp: 150 mL, Rfl: 3    albuterol (PROVENTIL HFA,VENTOLIN HFA) 90 mcg/act inhaler, Inhale 2 puffs 4 (four) times a day As directed, Disp: 3 Inhaler, Rfl: 1    budesonide-formoterol (SYMBICORT) 160-4 5 mcg/act inhaler, Inhale 2 puffs 2 (two) times a day, Disp: , Rfl:     buPROPion (WELLBUTRIN SR) 150 mg 12 hr tablet, 2 am, 1 pm, Disp: 90 tablet, Rfl: 5    diclofenac sodium (VOLTAREN) 1 %, Apply 4 g topically 4 (four) times a day as needed (pain), Disp: 300 g, Rfl: 6    diphenhydrAMINE (BENADRYL) 50 mg capsule, Take 50 mg by mouth every 6 (six) hours as needed, Disp: , Rfl:     fluticasone (FLONASE) 50 mcg/act nasal spray, 1 spray into each nostril as needed  , Disp: , Rfl:     ibuprofen (MOTRIN) 800 mg tablet, TAKE 1 TABLET (800 MG TOTAL) BY MOUTH EVERY 8 (EIGHT) HOURS AS NEEDED FOR MILD PAIN OR MODERATE PAIN, Disp: 90 tablet, Rfl: 0    levalbuterol (XOPENEX HFA) 45 mcg/act inhaler, Inhale 1-2 puffs every 4 (four) hours as needed for wheezing, Disp: 1 Inhaler, Rfl: 5    methocarbamol (ROBAXIN) 750 mg tablet, Take 750 mg by mouth every 6 (six) hours as needed, Disp: , Rfl:     montelukast (SINGULAIR) 10 mg tablet, TAKE 1 TABLET BY MOUTH DAILY AT BEDTIME, Disp: 90 tablet, Rfl: 0    oxyCODONE (ROXICODONE) 10 MG TABS, Take 10-15 mg by mouth every 6 (six) hours as needed, Disp: , Rfl:     traMADol (ULTRAM) 50 mg tablet, Take  mg by mouth every 6 (six) hours as needed, Disp: , Rfl:     valsartan-hydrochlorothiazide (DIOVAN-HCT) 160-25 MG per tablet, Take 1 tablet by mouth daily, Disp: 90 tablet, Rfl: 1    clotrimazole-betamethasone (LOTRISONE) 1-0 05 % cream, Apply topically 2 (two) times a day (Patient not taking: Reported on 12/9/2019), Disp: 30 g, Rfl: 0    hydroxychloroquine (PLAQUENIL) 200 mg tablet, Take 1 tablet (200 mg total) by mouth 2 (two) times a day (Patient not taking: Reported on 6/10/2021), Disp: 60 tablet, Rfl: 3    hydrOXYzine HCL (ATARAX) 25 mg tablet, TAKE 1 TABLET (25 MG TOTAL) BY MOUTH EVERY 6 (SIX) HOURS AS NEEDED FOR ITCHING (Patient not taking: Reported on 7/28/2020), Disp: 120 tablet, Rfl: 0    leflunomide (ARAVA) 10 MG tablet, TAKE 1 TABLET BY MOUTH EVERY DAY (Patient not taking: Reported on 6/10/2021), Disp: 30 tablet, Rfl: 0    mupirocin (BACTROBAN) 2 % ointment, Apply twice daily to open sores (Patient not taking: Reported on 6/10/2021), Disp: 22 g, Rfl: 3    predniSONE 1 mg tablet, TAKE 3 TABLETS BY MOUTH EVERY DAY (Patient not taking: Reported on 6/10/2021), Disp: 90 tablet, Rfl: 0    Risankizumab-rzaa,150 MG Dose, 75 MG/0 83ML PSKT, Inject under the skin  at week 0, week 4, and every 3 months (Patient not taking: Reported on 6/10/2021), Disp: 1 each, Rfl: 6    SPIRIVA RESPIMAT 1 25 MCG/ACT AERS inhaler, INHALE 2 INHALATIONS BY MOUTH DAILY (Patient not taking: Reported on 12/9/2019), Disp: 3 Inhaler, Rfl: 1    spironolactone (ALDACTONE) 25 mg tablet, Take 25 mg by mouth as needed, Disp: , Rfl:     terbinafine (LAMISIL AT) 1 % cream, Apply topically to feet twice a day (Patient not taking: Reported on 6/10/2021), Disp: 60 g, Rfl: 3    triamcinolone (KENALOG) 0 1 % ointment, APPLY TOPICALLY TWICE A DAY FOR 2 WEEKS (Patient not taking: Reported on 6/10/2021), Disp: 454 g, Rfl: 0    Objective:    Vitals:    06/10/21 1526   BP: 109/71   BP Location: Right arm   Patient Position: Sitting   Cuff Size: Large   Pulse: 88   Temp: 97 6 °F (36 4 °C)   TempSrc: Temporal   Weight: 88 7 kg (195 lb 9 6 oz)   Height: 5' 1" (1 549 m)       Physical Exam  Constitutional:       General: She is not in acute distress  Appearance: She is well-developed  HENT:      Head: Normocephalic and atraumatic  Eyes:      General: Lids are normal  No scleral icterus  Conjunctiva/sclera: Conjunctivae normal    Neck:      Musculoskeletal: Neck supple  No muscular tenderness  Cardiovascular:      Rate and Rhythm: Normal rate and regular rhythm  Heart sounds: S1 normal and S2 normal  No murmur  No friction rub  Pulmonary:      Effort: Pulmonary effort is normal  No tachypnea or respiratory distress  Breath sounds: Normal breath sounds  No wheezing, rhonchi or rales  Musculoskeletal:         General: Tenderness present  Skin:     General: Skin is warm and dry  Findings: Rash present  Nails: There is no clubbing  Comments: Psoriasis present on legs, arms, and abdomen; bilateral knee surgical scars   Neurological:      Mental Status: She is alert  Sensory: No sensory deficit  Psychiatric:         Behavior: Behavior normal  Behavior is cooperative  Reviewed labs and imaging  Imaging: Outside Bilateral Hand x-rays 11/291/9  Small areas suggestive of erosions, most notable right hand lunate,  along the scapholunate joint  Mild osteoarthritis of the IP joints    Right third finger DIP joint differential includes gouty arthropathy as well as arthritis      Outside Bilateral Feet x-rays 11/29/19  IMPRESSION:  Impression: Degenerative changes are demonstrated at the right and at the left foot  No erosive arthropathy is seen         Labs:   Outside Labs from [facility] 1/27-28/19: anti-dsDNA negative, anti-SSA/SSB negative, anti-Smith negative, anti-RNP negative, anti-Scl70 Ab negative, ANCA slightly positive at 1:80, PA-3 positive at 24, normal C3, C4 elevated at 40 7, CRP <3, cryoglobulin negative    Appointment on 06/07/2021   Component Date Value Ref Range Status    QFT Nil 06/07/2021 0 01  0 - 8 0 IU/ml Final    QFT TB1-NIL 06/07/2021 0 01  IU/ml Final    QFT TB2-NIL 06/07/2021 0 01  IU/ml Final    QFT Mitogen-NIL 06/07/2021 >10 00  IU/ml Final    QFT Final Interpretation 06/07/2021 Negative  Negative Final    No Interferon-gamma response to M  tuberculosis antigens detected  Infection with M  tuberculosis is unlikely  A single negative result does not exclude infection with M  tuberculosis  In patients at high risk for M  tuberculosis infection, a second test should be considered in accordance with the 2017 ATS/IDSA/CDC Clinical Practice Guidelines for Diagnosis of Tuberculosis in Adults and Children  False negative results can be a result of incorrect blood sample collection or handling of the specimen affecting lymphocyte function      Hep B Core Total Ab 06/07/2021 Non-reactive  Non-reactive Final    Hep B S Ab 06/07/2021 6 55  mIU/mL Final    Protective Immunity: Hep B Surface Antibody >= 10 mIu/ml (Traceable to [facility] International Reference Preparation)    Hepatitis B Surface Ag 06/07/2021 Non-reactive  Non-reactive, NonReactive - Confirmed Final    Hepatitis C Ab 06/07/2021 Non-reactive  Non-reactive Final

## 2021-06-10 NOTE — PATIENT INSTRUCTIONS
Start leflunomide 10mg daily  Can use clobetasol ointment or powder as needed for psoriasis  Take 2,000 units a day of Vit  D over the counter  Continue ibuprofen and diclofenac gel as needed  Will work on getting [facility] approved  Orthopedics referral made  Do labs in a month    Return to clinic in 3 months    Psoriatic Arthritis in Adults    What is psoriatic arthritis? -- Psoriatic arthritis is a condition that causes joint pain, swelling, and stiffness  It happens in people who have a long-term skin condition called psoriasis  People with psoriasis have patches of thick, red skin that are often covered by silver or white scales  Doctors don't know what causes psoriasis or psoriatic arthritis  What are the symptoms of psoriatic arthritis? -- Psoriatic arthritis causes pain, stiffness, and swelling in the affected joints  It can also affect the spine in some people  Because of the joint and spine problems, people can have trouble moving their body  Stiffness in the joints or low back is usually worse in the morning and lasts 30 minutes or longer  It usually gets better with exercise  Psoriatic arthritis can affect joints on one or both sides of the body  It usually affects more than one joint  In addition to joint symptoms (and the skin symptoms of psoriasis), people sometimes have other symptoms  These can include:  ? Swelling of a finger or toe, or the hands or feet  ? Swelling and pain in the back of the ankle or in the heel   ? Nail symptoms - The nails can look "pitted," as if they were pricked by a pin  The nail can also come up off the nail bed  ? Eye pain or redness  Is there a test for psoriatic arthritis? -- Yes  Your doctor or nurse will ask about your symptoms and do an exam  He or she will order X-rays of your painful joints  He or she might order an imaging test called an MRI  Imaging tests create pictures of the inside of the body    To check that another condition isn't causing your symptoms, your doctor or nurse might also order:  ?Blood tests  ? Lab tests on a sample of fluid from a swollen joint - To get a sample of fluid, the doctor will put a thin needle in your joint  How is psoriatic arthritis treated? -- There is no cure for psoriatic arthritis, but different treatments can help ease and control symptoms  Treatment for joint symptoms usually involves one or more of the following:  ?Medicines called nonsteroidal antiinflammatory drugs, or "NSAIDs" for short - Examples of NSAIDs are aspirin, ibuprofen (sample brand names: Advil, Motrin), and naproxen (sample brand name: Aleve)  ? Medicines that are usually used to treat other types of arthritis - Some of these include methotrexate and leflunomide  ? Medicines that block a substance called tumor necrosis factor, or "TNF" for short - TNF plays a role in psoriasis and psoriatic arthritis  Medicines that block TNF are called "anti-TNF" medicines  Examples include etanercept (brand name: Enbrel) and adalimumab (brand name: Humira)  ?Other medicines - If the options above don't help, your doctor might suggest trying a different medicine  Examples include ustekinumab (brand name: Alfreida ), secukinumab (brand name: Cosentyx), tofacitinib (brand name: [place] Villegas), abatacept (brand name: Sarthak Hines), and apremilast (brand name: Orestes Horn)  ? Shots of medicines called steroids that go into the painful joint - These are not the same as the steroids some athletes take illegally  These steroids help reduce swelling and pain  ? Heat - Heat, especially in the morning, can help reduce pain and stiffness  Do not use heat for longer than 20 minutes at a time  Also, do not use anything too hot that could burn your skin  ? Physical and occupational therapy - This involves learning exercises, movements, and ways of doing everyday tasks  ? Special shoe inserts (called "orthotics") - These can help keep your feet, ankles, and knees in the proper position    ?Treatment for psoriatic arthritis is usually long term  That's because even after symptoms get better, they sometimes return later on  Is there anything I can do on my own to feel better? -- Yes  It is very important that you stay active  You might want to avoid being active because you are in pain  But this can make things worse  It can make your muscles weak and your joints stiffer than they already are  Your doctor, nurse, or physical therapist can help you figure out which activities and exercises are right for you

## 2021-06-12 ENCOUNTER — TELEPHONE (OUTPATIENT)
Dept: RHEUMATOLOGY | Facility: CLINIC | Age: 56
End: 2021-06-12

## 2021-06-12 NOTE — TELEPHONE ENCOUNTER
Please work on prior authorization for [employer] pack and maintenance dose of 30mg tablet twice a day for patient's psoriatic arthritis and extensive psoriasis  She has recent viral hepatitis panel and TB quantiferon on file  She is up to date on her immunizations, and has no active infections  She has tried and had allergic reactions to methotrexate, sulfasalazine, and Humira  She is currently on leflunomide

## 2021-06-14 NOTE — TELEPHONE ENCOUNTER
Auth generate via [employer] but manually faxed in with clinical information    Loading dose key: [id number]  Maintenance dose key: [id number]

## 2021-06-15 NOTE — TELEPHONE ENCOUNTER
Patient called in to see if there was an update in regards to medication      She is in a lot of pain due to her arthritis, she says the medication is not helping    C/b # [phone]

## 2021-06-15 NOTE — TELEPHONE ENCOUNTER
We received responses back for both starter and maintenance dosing for Otezla  Insurance denied both  Same reasoning for both: "We are unable to determine medical necessity based on information submitted  In order for us to approve the requested drug, you must meet the following prior auth rules     - since the drug is associated with behavioral and mood changes, we need documentation that the patient has been evaluated for a history of prior suicide attempt, bipolar disorder or major depressive disorder and also that the patient will be monitored for behavioral and mood changes  - documentation the patient has tried 2 preferred drugs on the list of covered drugs  Records show you have tried one preferred drug, the patient must try one more  Preferred drugs include Enbrel and Taltz  To file a grievance/complaint: [phone]

## 2021-06-15 NOTE — TELEPHONE ENCOUNTER
Please let patient know that her insurance company prefers she try Enbrel or Taltz before approving [facility]  Would she be interested in either one? For her current arthritis flare, I can give her a brief prednisone course, would she like that?

## 2021-06-16 NOTE — TELEPHONE ENCOUNTER
Patient called back and needs some more information regarding other medications like, Taltz Or Enbrel      [facility] - [phone]

## 2021-06-17 RX ORDER — PREDNISONE 1 MG/1
TABLET ORAL
Qty: 21 TABLET | Refills: 0 | Status: SHIPPED | OUTPATIENT
Start: 2021-06-17 | End: 2021-07-01

## 2021-06-17 RX ORDER — DICLOFENAC SODIUM 75 MG/1
75 TABLET, DELAYED RELEASE ORAL 2 TIMES DAILY
Qty: 60 TABLET | Refills: 3 | Status: SHIPPED | OUTPATIENT
Start: 2021-06-17 | End: 2021-10-15

## 2021-06-17 NOTE — TELEPHONE ENCOUNTER
Patient is calling back to state no one is calling her back  I tried to reach the clinical line and was advised they're at lunch        Please call patient: [phone]

## 2021-06-17 NOTE — TELEPHONE ENCOUNTER
Just called patient back  Please work on obtaining prior Migue Barrera for [employer] ASAP for patient's significant psoriatic arthritis  She wants to proceed with Taltz every 4 week injections  I have updated her latest clinic note to reflect this  She is to self-inject 160 mg once, followed by 80 mg every 4 weeks  She has a recent negative viral hepatitis panel and TB test   Please see first message in conversation for rest of information you may need for submitting prior Auth

## 2021-06-18 DIAGNOSIS — L40.9 PSORIASIS: ICD-10-CM

## 2021-06-18 RX ORDER — CLOBETASOL PROPIONATE
POWDER (GRAM) MISCELLANEOUS 2 TIMES DAILY
Qty: 100 G | Refills: 3 | Status: SHIPPED | OUTPATIENT
Start: 2021-06-18 | End: 2021-06-21 | Stop reason: ALTCHOICE

## 2021-06-18 NOTE — TELEPHONE ENCOUNTER
I reviewed the note from [facility]   They are doing Kayce Altkoko so she does not need Madd[place]  [facility] Is handling [place]

## 2021-06-21 ENCOUNTER — TELEPHONE (OUTPATIENT)
Dept: RHEUMATOLOGY | Facility: CLINIC | Age: 56
End: 2021-06-21

## 2021-06-21 DIAGNOSIS — L40.50 PSORIATIC ARTHRITIS (HCC): Primary | ICD-10-CM

## 2021-06-21 DIAGNOSIS — L40.9 PSORIASIS: Primary | ICD-10-CM

## 2021-06-21 DIAGNOSIS — R21 RASH: ICD-10-CM

## 2021-06-21 RX ORDER — NYSTATIN 100000 [USP'U]/G
POWDER TOPICAL 3 TIMES DAILY
Qty: 60 G | Refills: 3 | Status: SHIPPED | OUTPATIENT
Start: 2021-06-21

## 2021-06-21 NOTE — TELEPHONE ENCOUNTER
Auth's generated in [employer], but faxed to [facility] with clinical info attached  Loading dose key: [id number]  Maint   Dose [place]: [id number]

## 2021-06-21 NOTE — TELEPHONE ENCOUNTER
[id number] called in wanting to clarify Clobetasol Propionate POWD           Please advise,

## 2021-06-21 NOTE — TELEPHONE ENCOUNTER
Sent to [facility], please let patient know so she can follow-up with them regarding delivery

## 2021-06-21 NOTE — TELEPHONE ENCOUNTER
[address] does not dispense clobetasol powder; please let patient know that I instead sent Nystatin powder to her regular [facility] pharmacy for her psoriasis under breasts

## 2021-06-21 NOTE — TELEPHONE ENCOUNTER
Both Loading and Maintenance dose of Khadra Castro has been approved through insurance  Please send to [facility]  Loading: valid for 2ml for 1 day from 6/21/21 - 7/21/2021  Maintenance: valid for 1 ml for 28 days from 6/21/21 - 6/21/2022

## 2021-06-22 NOTE — TELEPHONE ENCOUNTER
I left a detailed message notifying patient the medication was approved and sent to [facility]  I suggested she give it approximately 1 week and if she still doesn't hear from them to finalize delivery, she should call them  I provided her with their phone number  Advised if she has any further questions/concerns from us to call the office

## 2021-07-02 ENCOUNTER — TELEPHONE (OUTPATIENT)
Dept: OBGYN CLINIC | Facility: HOSPITAL | Age: 56
End: 2021-07-02

## 2021-07-02 NOTE — TELEPHONE ENCOUNTER
Please let her know that she can get the COVID vaccines any time, she does not need to wait or hold her medications

## 2021-07-02 NOTE — TELEPHONE ENCOUNTER
Patient sees Dr Fernandes    Patient wanted to let the Dr  know that she started taking the Hussein Fallen yesterday, she would like to know when she can get the Covid shot?     [facility] [phone]

## 2021-07-26 ENCOUNTER — TELEPHONE (OUTPATIENT)
Dept: OBGYN CLINIC | Facility: OTHER | Age: 56
End: 2021-07-26

## 2021-07-26 NOTE — TELEPHONE ENCOUNTER
Justin Pritchard called with concerns of a possible reaction to [address]  She has sores on her knees and back  Call transferred to [facility]    Thank you

## 2021-07-26 NOTE — TELEPHONE ENCOUNTER
I spoke with the patient  She confirmed she did hear from [facility] - information provided from him  However, she is now stating she has oozing blisters on B/L knees for the last 4-6 days  Her right knee blister is 2-3 inches and the left knee blister is 5-6 inches  The discharge from the blisters is a dark yellow in color  She has general soreness around the blisters  The symptoms began as an itching, which is still persisting throughout the rest of her body  She also noted having spots/sores on her back, which seemed to be getting better after the first dose of the medication but now they're worsening again  Unknown if thsi is a reaction to the medication or something normal until her symptoms get under control with the medication  She wants to know what to do?

## 2021-07-26 NOTE — TELEPHONE ENCOUNTER
I can see her on August 2nd at noon  In the meantime, she should not take any more Taltz and see if her symptoms self-resolve

## 2021-07-26 NOTE — TELEPHONE ENCOUNTER
Ixekizumab 80mg each 160mg    Symptoms diminishing, after third week , symptoms came back  Patient wants to inject a week early, [facility] in to ask if this is ok and if you can call her with an answer      C/b # Hiral Dowell @ [phone]

## 2021-07-26 NOTE — TELEPHONE ENCOUNTER
I spoke with [facility] - pharmacist  I provided him the "ok" for this time to do an early fill but also explained that it is only this time  If it continues then she needs to contact our office to discuss dose changing  He explained he has already mentioned that to her, specifically based on the approved qty/dose through insurance and FDA  He stated he will be contacting the patient to inform her this one early fill is ok but will also remind her if she continues to need another dose early or feels the dose is not working she needs to contact us/or the pharmacy asap to discuss

## 2021-07-26 NOTE — TELEPHONE ENCOUNTER
Can inject a week early this time, but if it happens again, need to discuss with patient about officially changing the dosing

## 2021-07-26 NOTE — TELEPHONE ENCOUNTER
Patient calling back to be soon asap, she sent in pictures and want to know if  seen the pictures  Earliest I can scheduled her was /17/2021, she wants to be seen sooner       # [id number]

## 2021-07-26 NOTE — TELEPHONE ENCOUNTER
The soonest available appt is 8/17  Based on your [employer] message, you advised to hold the medication and see if the symptoms resolve  If they do you recommended she see her PCP  Considering that information, do you feel it necessary that she come into our office to be seen? If so, do you feel the 17th is ok or do you recommend another time/day?

## 2021-07-28 ENCOUNTER — TELEPHONE (OUTPATIENT)
Dept: RHEUMATOLOGY | Facility: CLINIC | Age: 56
End: 2021-07-28

## 2021-07-28 NOTE — TELEPHONE ENCOUNTER
Based on the patients most recent [employer] message to Dr Cisco Stanton, Dr Maribell Lopez is recommending she go to the ED as this could be an allergic reaction  I left a detailed message on the patients vmail  I also attempted to contact her friend/emergency contact and received vmail but did not leave a message on that voicemail  I did also reply to her [employer] message informing the patient to go to the ED as well

## 2021-07-29 NOTE — TELEPHONE ENCOUNTER
I followed up with the patient since I received a notification she did not read her [employer] message  She denies receiving the message I left her yesterday too  (Patient messaged indicating she has blisters/sores on both of her knees and back and then they were spreading to her face so Dr Alivia iWlder recommended she go to the ED due to possible allergic reaction to the Dietra Cleverly )    She explained to me she did go to [facility] ED dept either Monday or Tuesday of this week due to blisters on her knees  While she was there they did a culture/biopsy of the blisters  When I spoke with her just now she stated the results came back that she has a staph infection  She is questioning if it is necessary for her to still go back to the ED? She does also have messages out to her PCP how to proceed from here but just wanted to check your opinion

## 2021-08-02 ENCOUNTER — OFFICE VISIT (OUTPATIENT)
Dept: RHEUMATOLOGY | Facility: CLINIC | Age: 56
End: 2021-08-02
Payer: COMMERCIAL

## 2021-08-02 VITALS
SYSTOLIC BLOOD PRESSURE: 124 MMHG | WEIGHT: 193.8 LBS | DIASTOLIC BLOOD PRESSURE: 86 MMHG | HEIGHT: 61 IN | BODY MASS INDEX: 36.59 KG/M2 | HEART RATE: 90 BPM

## 2021-08-02 DIAGNOSIS — Z79.899 HIGH RISK MEDICATION USE: ICD-10-CM

## 2021-08-02 DIAGNOSIS — M17.0 PRIMARY OSTEOARTHRITIS OF BOTH KNEES: ICD-10-CM

## 2021-08-02 DIAGNOSIS — R21 RASH: ICD-10-CM

## 2021-08-02 DIAGNOSIS — L40.9 PSORIASIS: ICD-10-CM

## 2021-08-02 DIAGNOSIS — L40.50 PSORIATIC ARTHRITIS (HCC): Primary | ICD-10-CM

## 2021-08-02 PROCEDURE — 99215 OFFICE O/P EST HI 40 MIN: CPT | Performed by: INTERNAL MEDICINE

## 2021-08-02 RX ORDER — DOXYCYCLINE 100 MG/1
TABLET ORAL
COMMUNITY
Start: 2021-07-29 | End: 2022-03-14 | Stop reason: ALTCHOICE

## 2021-08-02 NOTE — PROGRESS NOTES
Assessment and Plan: Cynthia Nolen is a 63 y o  female who presents for follow-up of her psoriatic arthritis  The honey crusted lesions on her knees do seem consistent with impetigo; asked patient to finish doxycycline course twice a day  Recommend that she follow up with Dermatology as soon as possible  Also recommended that she finish 14 days ibuprofen 3 times a day for possible impetigo on her knees  Asked patient to hold the [address] until the coast and some areas on her her knees completely heal  If crusted areas don't heal, patient is to let me know and I can prescribe another antibiotic such as Bactrim  Continue leflunomide 10mg po daily  Do routine monitoring labs before next visit  Provided wound care for her knees lesions  Plan:  Diagnoses and all orders for this visit:    Psoriatic arthritis ([facility] [address] )    Rash  -     Lyme Total Antibody Profile with reflex to WB    Psoriasis    Primary osteoarthritis of both knees    High risk medication use    Other orders  -     doxycycline (ADOXA) 100 MG tablet; TAKE 1 TABLET BY MOUTH TWICE A DAY FOR 10 DAYS    High risk medication use - Benefits and risks of leflunomide use, including but not limited to gastrointestinal disturbances such as nausea, diarrhea, stomatitis, hair loss, fatigue, leukopenia, and hepatotoxicity were discussed with the patient  CBC, CMP will be monitored regularly  Benefits and risks of [address], and include but are not limited to reactivation of hepatitis B/C or TB, IBD flare, infusion or site reactions, and increased risk of infections  A baseline CBC, CMP, Hepatitis B/C status, and TB test were checked  Patient will need CBC, CMP every 2 to 4 months and a CBC with infection      Follow-up plan: Return to clinic on 9/7th         Rheumatic Disease Summary:  Jose Luis Campoverde a 63 y o  female who originally presented on 9/16/19 as a Rheumatology consult referred by her Lili Wheeler MD for evaluation and management of her seronegative Rheumatoid arthritis  Patient had a DAS28 score of 3 32, consistent with moderate disease activity  She was having active inflammatory arthritis despite being on prednisone 10mg po daily, which she cannot stay on long-term without being tapered  Patient had already been tried on methotrexate and sulfasalazine DMARD therapy, both of which caused patient to develop a rash  It was deemed appropriate to pursue treatment with a biologic medication such as Humira to better control her disease, though this took several weeks to get approved  Vit  D level was low, which was being supplemented  DEXA scan returned normal  Prescribed diclofenac gel to apply to painful knees and shoulders as needed for both her RA and OA  Ordered bilateral hand and feet x-rays to obtain patient's latest baseline, which revealed an erosion at the 3rd DIP joint on the right hand, which is an atypical location for RA  Since patient had a history of miscarriage, ordered an antiphospholipid panel, which returned unremarkable       She presented for follow-up on 10/29/19, at which time she had not yet started on Humira, and her RA symptoms were overall stable  She was encouraged to go ahead and start Humira while starting to taper her prednisone by 1mg every 2 weeks  Also had started patient on the DMARD hydroxychloroquine 200mg po bid to work in conjunction with Humira to control her RA symptoms  She was to continue her ergocalciferol 50,000 units po weekly for her Vit  D deficiency until her Vit  D level was repeated  She was to continue to apply diclofenac gel as needed for her knee osteoarthritis-related pain       She then presented for follow-up on 12/10/19 as an urgent clinic visit after being managed at the [facility] ED for an extensive rash that she developed as an allergic reaction to Humira, which had significantly improved upon the medication's discontinuation and increased prednisone course   Patient's ANCA and LA-3 were slightly positive 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  Besides rash, which had resolved, patient had no signs or symptoms of a vasculitis, such as kidney or lung involvement  Had decided to hold off initiating any further DMARD or biologic therapy for patient's seronegative RA  Asked patient to increase her prednisone to 30mg po daily for a week, then decrease to 20mg po daily for a week, then 15mg po daily for a week, then 10mg po daily for a week, then go down by 1mg every two weeks as tolerated  She was to continue ergocalciferol 50,000 units po weekly for her Vit  D deficiency  Ordered repeat ANCA and the muscle enzymes CK and aldolase; muscle enzymes returned normal, and her atypical pANCA returned slightly positive at 1:80, though her regular C- and P-ANCA returned negative, as well as MPO and VA-3 returned negative, making it more likely that the patient 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  She next presented for follow-up on 5/18/20 via telemedicine for her seronegative Rheumatoid arthritis  She had a rash to methotrexate, sulfasalazine, and Humira so far  Was considering starting patient on leflunomide 10mg po daily for DMARD therapy, so that her prednisone could be tapered down in the future  Ordered baseline CBC, CMP prior to starting per patient's request, and ESR, CRP for disease activity monitoring  She was to continue prednisone 10mg po daily for time-being, and diclofenac gel as needed for joint pain  8/6/20: Her right shoulder has been bothering her as well as her right knee, in which she has OA  She can restart taking HCQ 200mg po bid, since she didn't actually have a reaction to it in the past, rather it was Humira that she developed a severe rash to  She can use diclofenac gel or ibuprofen for knee pain as needed (asked her to not use them at the same time)  She is to continue 2,000 units a day of Vit  D for low Vit   D  Also, she is to contact Orthopedics regarding possible knee replacement  6/10/21: With patient's recently active psoriasis, it is now apparent that she actually has psoriatic arthritis rather than seronegative RA, especially since her x-rays reveal inflammatory joint changes at her DIPs, which is much more common in psoriatic arthritis than RA  Patient would be a good candidate for Taltz to help both her extensive psoriasis as well as arthritis symptoms; it would be 160mg once followed by 80mg every 4 weeks  She had a severe rash allergic reaction to Humira in the past, so is hesitant to try any other TNF inhibitors at this time  Has had allergic reactions of rash to methotrexate sulfasalazine in the past  Restarted leflunomide 10mg po daily while awaiting Taltz approval; do not believe leflunomide alone can help resolved patient's extensive skin psoriasis  Can use clobetasol ointment or powder as needed for psoriasis  Take 2,000 units a day of Vit  D over the counter  Can take diclofenac and diclofenac gel as needed for joint pain  Orthopedics referral made for patient to re-establish with Dr Beni Steinberg; is s/p bilateral knee replacement at [facility], but patient wants to follow-up with [facility]   Bebo Wang is a 63 y o   female who presents for follow-up of her seronegative RA  Last clinic visit was 06/10/2021  Since then, patient started Lorren Sa, which seemed to help her psoriasis at [facility], however has noticed that she started getting honey crusted lesions on her knees recently  She is afraid that she has had an allergic reaction to either Taltz or to her recent bilateral knee replacements  The following portions of the patient's history were reviewed and updated as appropriate: allergies, current medications, past family history, past medical history, past social history, past surgical history and problem list     Review of Systems:   Review of Systems   Constitutional: Negative for fatigue and unexpected weight change     HENT: Negative for mouth sores  Respiratory: Negative for cough and shortness of breath  Gastrointestinal: Negative for constipation and diarrhea  Musculoskeletal: Positive for arthralgias and joint swelling  Negative for back pain and myalgias  Skin: Positive for rash  Negative for color change  Neurological: Negative for weakness  Psychiatric/Behavioral: Negative for hallucinations and sleep disturbance         Home Medications:     Current Outpatient Medications:     acetaminophen (TYLENOL) 650 mg CR tablet, Take 650 mg by mouth every 12 (twelve) hours (Patient not taking: Reported on 8/3/2021), Disp: , Rfl:     albuterol (2 5 mg/3 mL) 0 083 % nebulizer solution, Take 1 vial (2 5 mg total) by nebulization every 6 (six) hours as needed for wheezing, Disp: 150 mL, Rfl: 3    albuterol (PROVENTIL HFA,VENTOLIN HFA) 90 mcg/act inhaler, Inhale 2 puffs 4 (four) times a day As directed, Disp: 3 Inhaler, Rfl: 1    budesonide-formoterol (SYMBICORT) 160-4 5 mcg/act inhaler, Inhale 2 puffs 2 (two) times a day, Disp: , Rfl:     buPROPion (WELLBUTRIN SR) 150 mg 12 hr tablet, 2 am, 1 pm, Disp: 90 tablet, Rfl: 5    clobetasol (TEMOVATE) 0 05 % ointment, Apply topically 2 (two) times a day (Patient not taking: Reported on 8/3/2021), Disp: 60 g, Rfl: 3    diclofenac (VOLTAREN) 75 mg EC tablet, Take 1 tablet (75 mg total) by mouth 2 (two) times a day (Patient not taking: Reported on 8/3/2021), Disp: 60 tablet, Rfl: 3    diclofenac sodium (VOLTAREN) 1 %, Apply 4 g topically 4 (four) times a day as needed (pain) (Patient not taking: Reported on 8/3/2021), Disp: 300 g, Rfl: 6    Diclofenac Sodium (VOLTAREN) 1 %, Apply 2 g topically 4 (four) times a day (Patient not taking: Reported on 8/3/2021), Disp: 100 g, Rfl: 6    doxycycline (ADOXA) 100 MG tablet, TAKE 1 TABLET BY MOUTH TWICE A DAY FOR 10 DAYS, Disp: , Rfl:     fluticasone (FLONASE) 50 mcg/act nasal spray, 1 spray into each nostril as needed  , Disp: , Rfl:    Ixekizumab 80 MG/ML SOAJ, Inject 1 mL (80 mg total) under the skin every 28 days Maintenance dose (Patient not taking: Reported on 8/3/2021), Disp: 1 mL, Rfl: 11    leflunomide (ARAVA) 10 MG tablet, Take 1 tablet (10 mg total) by mouth daily, Disp: 30 tablet, Rfl: 3    levalbuterol (XOPENEX HFA) 45 mcg/act inhaler, Inhale 1-2 puffs every 4 (four) hours as needed for wheezing, Disp: 1 Inhaler, Rfl: 5    methocarbamol (ROBAXIN) 750 mg tablet, Take 750 mg by mouth every 6 (six) hours as needed (Patient not taking: Reported on 8/3/2021), Disp: , Rfl:     montelukast (SINGULAIR) 10 mg tablet, TAKE 1 TABLET BY MOUTH DAILY AT BEDTIME, Disp: 90 tablet, Rfl: 0    nystatin (MYCOSTATIN) powder, Apply topically 3 (three) times a day, Disp: 60 g, Rfl: 3    spironolactone (ALDACTONE) 25 mg tablet, Take 25 mg by mouth as needed, Disp: , Rfl:     traMADol (ULTRAM) 50 mg tablet, Take  mg by mouth every 6 (six) hours as needed (Patient not taking: Reported on 8/3/2021), Disp: , Rfl:     valsartan-hydrochlorothiazide (DIOVAN-HCT) 160-25 MG per tablet, Take 1 tablet by mouth daily (Patient not taking: Reported on 8/3/2021), Disp: 90 tablet, Rfl: 1    diphenhydrAMINE (BENADRYL) 50 mg capsule, Take 50 mg by mouth every 6 (six) hours as needed, Disp: , Rfl:     hydrOXYzine HCL (ATARAX) 25 mg tablet, TAKE 1 TABLET (25 MG TOTAL) BY MOUTH EVERY 6 (SIX) HOURS AS NEEDED FOR ITCHING (Patient not taking: Reported on 7/28/2020), Disp: 120 tablet, Rfl: 0    Ixekizumab 80 MG/ML SOAJ, Inject 2 mL (160 mg total) under the skin once for 1 dose Loading dose, Disp: 2 mL, Rfl: 0    mupirocin (BACTROBAN) 2 % ointment, Apply twice daily to open sores, Disp: 22 g, Rfl: 3    mupirocin (BACTROBAN) 2 % ointment, Apply topically 2 (two) times a day, Disp: 22 g, Rfl: 0    SPIRIVA RESPIMAT 1 25 MCG/ACT AERS inhaler, INHALE 2 INHALATIONS BY MOUTH DAILY (Patient not taking: Reported on 12/9/2019), Disp: 3 Inhaler, Rfl: 1    terbinafine (LAMISIL AT) 1 % cream, Apply topically to feet twice a day (Patient not taking: Reported on 6/10/2021), Disp: 60 g, Rfl: 3    triamcinolone (KENALOG) 0 1 % ointment, APPLY TOPICALLY TWICE A DAY FOR 2 WEEKS (Patient not taking: Reported on 6/10/2021), Disp: 454 g, Rfl: 0    triamcinolone (KENALOG) 0 1 % ointment, Apply topically 2 (two) times a day, Disp: 454 g, Rfl: 1    Objective:    Vitals:    08/02/21 1157   BP: 124/86   BP Location: Left arm   Patient Position: Sitting   Cuff Size: Standard   Pulse: 90   Weight: 87 9 kg (193 lb 12 8 oz)   Height: 5' 1" (1 549 m)       Physical Exam  Constitutional:       General: She is not in acute distress  Appearance: She is well-developed  HENT:      Head: Normocephalic and atraumatic  Eyes:      General: Lids are normal  No scleral icterus  Conjunctiva/sclera: Conjunctivae normal    Cardiovascular:      Rate and Rhythm: Normal rate and regular rhythm  Heart sounds: S1 normal and S2 normal  No murmur heard  No friction rub  Pulmonary:      Effort: Pulmonary effort is normal  No tachypnea or respiratory distress  Breath sounds: Normal breath sounds  No wheezing, rhonchi or rales  Musculoskeletal:         General: No tenderness  Cervical back: Neck supple  No muscular tenderness  Skin:     General: Skin is warm and dry  Findings: Rash present  Nails: There is no clubbing  Comments: Spot psoriasis on distal legs; honey-crusted drainage on bilateral knees   Neurological:      Mental Status: She is alert  Sensory: No sensory deficit  Psychiatric:         Behavior: Behavior normal  Behavior is cooperative  Reviewed labs and imaging  Imaging: Outside Bilateral Hand x-rays 11/291/9  Small areas suggestive of erosions, most notable right hand lunate,  along the scapholunate joint  Mild osteoarthritis of the IP joints    Right third finger DIP joint differential includes gouty arthropathy as well as arthritis      Outside Bilateral Feet x-rays 11/29/19  IMPRESSION:  Impression: Degenerative changes are demonstrated at the right and at the left foot  No erosive arthropathy is seen         Labs:   Outside Labs from [facility] 1/27-28/19: anti-dsDNA negative, anti-SSA/SSB negative, anti-Smith negative, anti-RNP negative, anti-Scl70 Ab negative, ANCA slightly positive at 1:80, NC-3 positive at 24, normal C3, C4 elevated at 40 7, CRP <3, cryoglobulin negative    Appointment on 06/07/2021   Component Date Value Ref Range Status    QFT Nil 06/07/2021 0 01  0 - 8 0 IU/ml Final    QFT TB1-NIL 06/07/2021 0 01  IU/ml Final    QFT TB2-NIL 06/07/2021 0 01  IU/ml Final    QFT Mitogen-NIL 06/07/2021 >10 00  IU/ml Final    QFT Final Interpretation 06/07/2021 Negative  Negative Final    No Interferon-gamma response to M  tuberculosis antigens detected  Infection with M  tuberculosis is unlikely  A single negative result does not exclude infection with M  tuberculosis  In patients at high risk for M  tuberculosis infection, a second test should be considered in accordance with the 2017 ATS/IDSA/CDC Clinical Practice Guidelines for Diagnosis of Tuberculosis in Adults and Children  False negative results can be a result of incorrect blood sample collection or handling of the specimen affecting lymphocyte function      Hep B Core Total Ab 06/07/2021 Non-reactive  Non-reactive Final    Hep B S Ab 06/07/2021 6 55  mIU/mL Final    Protective Immunity: Hep B Surface Antibody >= 10 mIu/ml (Traceable to [facility] Reference Preparation)    Hepatitis B Surface Ag 06/07/2021 Non-reactive  Non-reactive, NonReactive - Confirmed Final    Hepatitis C Ab 06/07/2021 Non-reactive  Non-reactive Final

## 2021-08-02 NOTE — PATIENT INSTRUCTIONS
Finish doxycycline twice a day course  Finish 14 days of mupirocin three times a day  Hold Taltz until crusted areas completely heal  If crusted areas don't heal, let me know and I can prescribe another antibiotic such as Bactrim  Continue leflunomide daily  Do labs before next visit    Return to clinic on 9/7th    Impetigo   AMBULATORY CARE:   Impetigo  is a skin infection caused by bacteria  The infection can cause sores to form anywhere on your body  The sores develop watery or pus-filled blisters that break and form thick crusts  Impetigo is most common in children and spreads easily from person to person  Seek care immediately if:   · You have painful, red, warm skin around the blisters  · Your face is swollen  · You urinate less than usual or there is blood in your urine  Contact your healthcare provider if:   · You have a fever  · The sores become more red, swollen, warm, or tender  · The sores do not start to heal after 3 days of treatment  · You have questions or concerns about your condition or care  Treatment for impetigo  includes antibiotics to treat the bacterial infection  Antibiotics may be given as a pill or cream  Wash your skin and gently remove any crusts before you apply the antibiotic cream   Clean your sores safely:  Wash your skin sores with antibacterial soap and water  You may need to do this 2 to 3 times each day until the sores heal  If the area is crusted, gently wash the sores with gauze or a clean washcloth to remove the crust  Pat the area dry with a clean towel  Wash your hands, the washcloth, and the towel after you clean the area around the sores  Prevent the spread of impetigo:   · Avoid direct contact  You can spread impetigo if someone touches or uses something that touched your infected skin  You can also spread impetigo on your own body when you touch the area and then touch somewhere else   Keep the sores covered with gauze so you will not scratch or touch them  Keep your fingernails short  Your child may need to wear mittens so he does not scratch his sores  · Wash your hands often  Always wash your hands after you touch the infected area  Wash your hands before you touch food, your eyes, or other people  If no water is available, use an alcohol-based gel to clean your hands  · Wash household items  Do not share or reuse items that have come in contact with impetigo sores  Examples include bedding, towels, washcloths, and eating utensils  These items may be used again after they have been washed with hot water and soap  Return to work or school: You may return to work or school 48 hours after you start the antibiotic medicine  If your child has impetigo, tell his school or  center about the infection  Follow up with your healthcare provider as directed:  Write down your questions so you remember to ask them during your visits  © Copyright [employer] 2021 Information is for End User's use only and may not be sold, redistributed or otherwise used for commercial purposes  All illustrations and images included in CareNotes® are the copyrighted property of A D A M , Inc  or Rangel Suh   The above information is an  only  It is not intended as medical advice for individual conditions or treatments  Talk to your doctor, nurse or pharmacist before following any medical regimen to see if it is safe and effective for you

## 2021-08-03 ENCOUNTER — OFFICE VISIT (OUTPATIENT)
Dept: DERMATOLOGY | Facility: CLINIC | Age: 56
End: 2021-08-03
Payer: COMMERCIAL

## 2021-08-03 VITALS — HEIGHT: 61 IN | BODY MASS INDEX: 36.06 KG/M2 | WEIGHT: 191 LBS | TEMPERATURE: 97.5 F

## 2021-08-03 DIAGNOSIS — L40.9 PSORIASIS: ICD-10-CM

## 2021-08-03 DIAGNOSIS — R21 RASH: Primary | ICD-10-CM

## 2021-08-03 PROCEDURE — 99213 OFFICE O/P EST LOW 20 MIN: CPT | Performed by: STUDENT IN AN ORGANIZED HEALTH CARE EDUCATION/TRAINING PROGRAM

## 2021-08-03 PROCEDURE — 3008F BODY MASS INDEX DOCD: CPT | Performed by: STUDENT IN AN ORGANIZED HEALTH CARE EDUCATION/TRAINING PROGRAM

## 2021-08-03 PROCEDURE — 1036F TOBACCO NON-USER: CPT | Performed by: STUDENT IN AN ORGANIZED HEALTH CARE EDUCATION/TRAINING PROGRAM

## 2021-08-03 NOTE — PROGRESS NOTES
I spoke with the patient  I provided her with the information  She is very adament about not having the biopsy done  She stated "she just had a biopsy of her knee(s) she is not going to go through the biopsy of the sores   She is having a hard enough time healing from whatever she does have "

## 2021-08-03 NOTE — LETTER
August 3, 2021     MD Wil [address]    Patient: John Riley   YOB: 1965   Date of Visit: 8/3/2021       Dear Dr Toney Keita: Thank you for referring John Riley to me for evaluation  Below are the relevant portions of my assessment and plan of care  If you have questions, please do not hesitate to call me  I look forward to following Enrique Allen along with you           Sincerely,        Brittany Machado MD        CC: No Recipients Refill faxed, with qty of 90 and 3 refills.

## 2021-08-03 NOTE — PATIENT INSTRUCTIONS
FOLLOW UP: PSORIASIS + PSORIATIC ARTHRITIS    Additional History of Present Condition:     History of Arthritis: YES   Any recent infections: YES   History of malignancy: No   Previous Treatment:  has uses nystatin cream under breast and groin area, but moisture is irritating it  Then switched to nystatin powder, patient presents now clear in those areas   Patient was using Ethel Counter; patient only took 2 shots then every 28 days supposed to do another shot, patient also taking doxycycline 100 mg   Affected Body surface area: 20%   Did you experience any side effects of treatment: no   Are you happy with the improvement: yes      Assessment and Plan:  Based on a thorough discussion of this condition and the management approach to it (including a comprehensive discussion of the known risks, side effects and potential benefits of treatment), the patient (family) agrees to implement the following specific plan:   Patient was started on Taltz by rheumatologist  Pt instructed to continue Ethel Counter (will CC: rheumatologist)  Mandy Engle course of doxycycline prescribed by rheumatologist   Continue mupirocin 2% ointment to lesions on bilateral knees and triamcinolone 0 1% ointment 2x/day; avoid face and groin area      RASH: Bilateral knees/legs--> likely some type of allergic reaction to knee replacements    Assessment and Plan:  Based on a thorough discussion of this condition and the management approach to it (including a comprehensive discussion of the known risks, side effects and potential benefits of treatment), the patient (family) agrees to implement the following specific plan:    Speak to orthopedic surgeon about reaction  Mandy Engle course of doxycycline prescribed by rheumatologist   Continue mupirocin 2% ointment to lesions on bilateral knees and triamicinolone 0 1% ointment 2x/day    Discussed biopsy with patient; patient denied at this time

## 2021-08-03 NOTE — PROGRESS NOTES
[facility] Follow Up Note    Patient Name: Maira Brown  Encounter Date: 08/03/21    Today's Chief Concerns:  Liudmila Joseph Concern #1:  Sanjiv Hazy to psorasis      Current Medications:    Current Outpatient Medications:     albuterol (2 5 mg/3 mL) 0 083 % nebulizer solution, Take 1 vial (2 5 mg total) by nebulization every 6 (six) hours as needed for wheezing, Disp: 150 mL, Rfl: 3    albuterol (PROVENTIL HFA,VENTOLIN HFA) 90 mcg/act inhaler, Inhale 2 puffs 4 (four) times a day As directed, Disp: 3 Inhaler, Rfl: 1    budesonide-formoterol (SYMBICORT) 160-4 5 mcg/act inhaler, Inhale 2 puffs 2 (two) times a day, Disp: , Rfl:     buPROPion (WELLBUTRIN SR) 150 mg 12 hr tablet, 2 am, 1 pm, Disp: 90 tablet, Rfl: 5    doxycycline (ADOXA) 100 MG tablet, TAKE 1 TABLET BY MOUTH TWICE A DAY FOR 10 DAYS, Disp: , Rfl:     fluticasone (FLONASE) 50 mcg/act nasal spray, 1 spray into each nostril as needed  , Disp: , Rfl:     leflunomide (ARAVA) 10 MG tablet, Take 1 tablet (10 mg total) by mouth daily, Disp: 30 tablet, Rfl: 3    levalbuterol (XOPENEX HFA) 45 mcg/act inhaler, Inhale 1-2 puffs every 4 (four) hours as needed for wheezing, Disp: 1 Inhaler, Rfl: 5    montelukast (SINGULAIR) 10 mg tablet, TAKE 1 TABLET BY MOUTH DAILY AT BEDTIME, Disp: 90 tablet, Rfl: 0    mupirocin (BACTROBAN) 2 % ointment, Apply twice daily to open sores, Disp: 22 g, Rfl: 3    nystatin (MYCOSTATIN) powder, Apply topically 3 (three) times a day, Disp: 60 g, Rfl: 3    spironolactone (ALDACTONE) 25 mg tablet, Take 25 mg by mouth as needed, Disp: , Rfl:     acetaminophen (TYLENOL) 650 mg CR tablet, Take 650 mg by mouth every 12 (twelve) hours (Patient not taking: Reported on 8/3/2021), Disp: , Rfl:     clobetasol (TEMOVATE) 0 05 % ointment, Apply topically 2 (two) times a day (Patient not taking: Reported on 8/3/2021), Disp: 60 g, Rfl: 3    diclofenac (VOLTAREN) 75 mg EC tablet, Take 1 tablet (75 mg total) by mouth 2 (two) times a day (Patient not taking: Reported on 8/3/2021), Disp: 60 tablet, Rfl: 3    diclofenac sodium (VOLTAREN) 1 %, Apply 4 g topically 4 (four) times a day as needed (pain) (Patient not taking: Reported on 8/3/2021), Disp: 300 g, Rfl: 6    Diclofenac Sodium (VOLTAREN) 1 %, Apply 2 g topically 4 (four) times a day (Patient not taking: Reported on 8/3/2021), Disp: 100 g, Rfl: 6    diphenhydrAMINE (BENADRYL) 50 mg capsule, Take 50 mg by mouth every 6 (six) hours as needed, Disp: , Rfl:     hydrOXYzine HCL (ATARAX) 25 mg tablet, TAKE 1 TABLET (25 MG TOTAL) BY MOUTH EVERY 6 (SIX) HOURS AS NEEDED FOR ITCHING (Patient not taking: Reported on 7/28/2020), Disp: 120 tablet, Rfl: 0    Ixekizumab 80 MG/ML SOAJ, Inject 2 mL (160 mg total) under the skin once for 1 dose Loading dose, Disp: 2 mL, Rfl: 0    Ixekizumab 80 MG/ML SOAJ, Inject 1 mL (80 mg total) under the skin every 28 days Maintenance dose (Patient not taking: Reported on 8/3/2021), Disp: 1 mL, Rfl: 11    methocarbamol (ROBAXIN) 750 mg tablet, Take 750 mg by mouth every 6 (six) hours as needed (Patient not taking: Reported on 8/3/2021), Disp: , Rfl:     SPIRIVA RESPIMAT 1 25 MCG/ACT AERS inhaler, INHALE 2 INHALATIONS BY MOUTH DAILY (Patient not taking: Reported on 12/9/2019), Disp: 3 Inhaler, Rfl: 1    terbinafine (LAMISIL AT) 1 % cream, Apply topically to feet twice a day (Patient not taking: Reported on 6/10/2021), Disp: 60 g, Rfl: 3    traMADol (ULTRAM) 50 mg tablet, Take  mg by mouth every 6 (six) hours as needed (Patient not taking: Reported on 8/3/2021), Disp: , Rfl:     triamcinolone (KENALOG) 0 1 % ointment, APPLY TOPICALLY TWICE A DAY FOR 2 WEEKS (Patient not taking: Reported on 6/10/2021), Disp: 454 g, Rfl: 0    valsartan-hydrochlorothiazide (DIOVAN-HCT) 160-25 MG per tablet, Take 1 tablet by mouth daily (Patient not taking: Reported on 8/3/2021), Disp: 90 tablet, Rfl: 1    Allergies   Allergen Reactions    Sulfasalazine Rash     rash    Other Rash Adhesive tape and adhesive bandaids    Wound Dressing Adhesive Other (See Comments)     rash, itchy    Adalimumab Itching and Rash       CONSTITUTIONAL:   Vitals:    08/03/21 1056   Temp: 97 5 °F (36 4 °C)   TempSrc: Tympanic   Weight: 86 6 kg (191 lb)   Height: 5' 1" (1 549 m)       Specific Alerts:    Have you been seen by a [facility]atologist in the last 3 years? YES    Are you pregnant or planning to become pregnant? No    Are you currently or planning to be nursing or breast feeding? No    Allergies   Allergen Reactions    Sulfasalazine Rash     rash    Other Rash     Adhesive tape and adhesive bandaids    Wound Dressing Adhesive Other (See Comments)     rash, itchy    Adalimumab Itching and Rash       May we call your Preferred Phone number to discuss your specific medical information? YES    May we leave a detailed message that includes your specific medical information? YES    Have you traveled outside of the [facility] in the past 3 months? No    Do you currently have a pacemaker or defibrillator? No    Do you have any artificial heart valves, joints, plates, screws, rods, stents, pins, etc? YES   - If Yes, were any placed within the last 2 years? 2021    Do you require any medications prior to a surgical procedure? YES   - If Yes, for which procedure? dental   - If Yes, what medications to you require? antibotics    Are you taking any medications that cause you to bleed more easily ("blood thinners") No    Have you ever experienced a rapid heartbeat with epinephrine? No    Have you ever been treated with "gold" (gold sodium thiomalate) therapy? [facility] can help with wrinkles, "laugh lines," facial volume loss, "double chin," "love handles," age spots, and more  Are you interested in learning today about some of the skin enhancement procedures that we offer?  (If Yes, please provide more detail) {    Review of Systems:  Have you recently had or currently have any of the following? · Fever or chills: No  · Night Sweats: No  · Headaches: No  · Weight Gain: No  · Weight Loss: YES  · Blurry Vision: No  · Nausea: No  · Vomiting: No  · Diarrhea: No  · Blood in Stool: No  · Abdominal Pain: No  · Itchy Skin: YES  · Painful Joints: YES  · Swollen Joints: YES  · Muscle Pain: No  · Irregular Mole: No  · Sun Burn: No  · Dry Skin: No  · Skin Color Changes: No  · Scar or Keloid: YES  · Cold Sores/Fever Blisters: No  · Bacterial Infections/MRSA: possible postive staph infection  · Anxiety: No  · Depression: No  · Suicidal or Homicidal Thoughts: No    PSYCH: Normal mood and affect  EYES: Normal conjunctiva  ENT: Normal lips and oral mucosa  CARDIOVASCULAR: No edema  RESPIRATORY: Normal respirations  HEME/LYMPH/IMMUNO:  No ostensible subQ swelling except as noted below in ASSESSMENT AND PLAN BY DIAGNOSIS    FULL ORGAN SYSTEM SKIN EXAM (SKIN)    Face Normal except as noted below in Assessment   Neck Normal except as noted below in Assessment   Right Arm/Hand Normal except as noted below in Assessment   Left Arm/Hand Normal except as noted below in Assessment       Abdomen, Umbilicus Normal except as noted below in Assessment   Back/Spine Normal except as noted below in Assessment   Right Leg Normal except as noted below in Assessment   Left Leg Normal except as noted below in Assessment       FOLLOW UP: PSORIASIS + PSORIATIC ARTHRITIS    Physical Exam:   Anatomic Location Affected:  bilateral lower extremities, trunk,face   Morphological Description: Scattered eroded papules   Pertinent Positives:   Pertinent Negatives: Additional History of Present Condition:     History of Arthritis: YES   Any recent infections: YES   History of malignancy: No   Previous Treatment:  has uses nystatin cream under breast and groin area, but moisture is irritating it  Then switched to nystatin powder, patient presents now clear in those areas   Patient was using [place]; patient only took 2 shots then every 28 days supposed to do another shot, patient also taking doxycycline 100 mg   Affected Body surface area: 20%   Did you experience any side effects of treatment: no   Are you happy with the improvement: yes      Assessment and Plan:  Based on a thorough discussion of this condition and the management approach to it (including a comprehensive discussion of the known risks, side effects and potential benefits of treatment), the patient (family) agrees to implement the following specific plan:   Patient was started on Taltz by rheumatologist  Pt instructed to continue [facility] (will CC: rheumatologist)  Pansy Jester course of doxycycline prescribed by rheumatologist   Continue mupirocin 2% ointment to lesions on bilateral knees and triamcinolone 0 1% ointment 2x/day; avoid face and groin area      RASH: Bilateral knees/legs--> likely some type of allergic reaction to knee replacements    Physical Exam:   (Anatomic Location); (Size and Morphological Description); (Differential Diagnosis):  Bilateral knees/lower legs with weeping erosive red plaques   Pertinent Positives:   Pertinent Negatives: Additional History of Present Condition:  Started soon after knee replacements in March and May 2021   Had similar rxn to previous knee replacement    Assessment and Plan:  Based on a thorough discussion of this condition and the management approach to it (including a comprehensive discussion of the known risks, side effects and potential benefits of treatment), the patient (family) agrees to implement the following specific plan:    Please speak to orthopedic surgeon about reaction  Pansy Jester course of doxycycline prescribed by rheumatologist   Continue mupirocin 2% ointment to lesions on bilateral knees and triamicinolone 0 1% ointment 2x/day    Discussed biopsy with patient; patient denied at this time despite discussing how it might assist in diagnosis      Scribe Attestation    I,:  Akil Rojas am acting as a scribe while in the presence of the attending physician :       I,:  Rubio Rich MD personally performed the services described in this documentation    as scribed in my presence :

## 2021-08-24 ENCOUNTER — TELEPHONE (OUTPATIENT)
Dept: SLEEP CENTER | Facility: CLINIC | Age: 56
End: 2021-08-24

## 2021-08-24 NOTE — TELEPHONE ENCOUNTER
Patient called regarding [employer] CPAP recall  She states she has registered her machine with [employer]  Advised as below:  Continuous Positive Airway Pressure (CPAP) therapy was prescribed to you as a medical necessity and there are risks of discontinuing  use of the device, some of which may be long term  Symptoms you experienced before using CPAP may return such as snoring, apneas, excessive daytime sleepiness, hypertension, cardiac arrhythmias, risk of stroke, congestive heart-failure, exacerbation of COPD and potential respiratory failure  Ultimately, it is a personal decision for you to make if you continue use of an affected device or discontinue until a replacement is provided  Unfortunately, [employer] has not yet provided us with information about available devices     Another option would be to check with your medical equipment provider to determine if you are eligible for a new machine through your insurance, if not you can pay out of pocket for a new machine  We are able to provide you with a script, please include your mask type  Patient states she has not been using her machine since she found out about the recall  She plans to contact her insurance company to see if they would cover the cost of a replacement and will call back for a script if she would like to move forward with replacement

## 2021-09-01 ENCOUNTER — TELEPHONE (OUTPATIENT)
Dept: OBGYN CLINIC | Facility: HOSPITAL | Age: 56
End: 2021-09-01

## 2021-09-01 ENCOUNTER — TELEPHONE (OUTPATIENT)
Dept: INFECTIOUS DISEASES | Facility: CLINIC | Age: 56
End: 2021-09-01

## 2021-09-01 ENCOUNTER — TELEPHONE (OUTPATIENT)
Dept: DERMATOLOGY | Age: 56
End: 2021-09-01

## 2021-09-01 NOTE — TELEPHONE ENCOUNTER
Patient called in wanting to scheduled an appt for her knees  She had sx in the knees this year with [facility]  Her records are in the system  Can Dr review records?        Please adviseYahir#: [phone]

## 2021-09-01 NOTE — TELEPHONE ENCOUNTER
Patient's chart and materials were reviewed     She has seen the following [facility] providers,   Dr Michelle Lim,  Dr Marylee Castle,  Dr Cristobal Bergeron, Dr Braden Maier describes an individual who wanted a right total knee August of 2020, demanding a surgical procedure from Dr Benjamin Daley, which he did not advise in August of 2020 at that time, or expediently  She in turn had right total knee surgery by Dr Tariq Stewart,  at [facility],  May of this year  She has a recent emergency room visit for a painful knee, and consideration for an indolent infection,    the note describing an aspirate that was deemed positive for potential infection  After our review,  the patient is indicated to follow-up with Dr Melony Ibarra, her surgeon of choice,    or Dr Michelle Lim     Thank you for the consideration      MOISES

## 2021-09-01 NOTE — TELEPHONE ENCOUNTER
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

## 2021-09-01 NOTE — TELEPHONE ENCOUNTER
Called and spoke with pt regarding referral  In regards to possible infection pt stated that she has been told different things from her ortho at [facility] and wanted to get a second opinion from [facility] ortho and that she needs IV abx  Informed her that she should see Ortho first for second opinion and that if they feel it is necessary to be seen by ID they can refer her to us  She then stated that she has psoriasis and wanted to see ID regarding that  Informed her that dermatology is more appropriate at treating psoriasis as it is not an infection  PT understood and stated she will follow up with ortho and go back to PCP regarding psoriasis

## 2021-11-02 ENCOUNTER — OFFICE VISIT (OUTPATIENT)
Dept: RHEUMATOLOGY | Facility: CLINIC | Age: 56
End: 2021-11-02
Payer: COMMERCIAL

## 2021-11-02 VITALS — SYSTOLIC BLOOD PRESSURE: 110 MMHG | BODY MASS INDEX: 36.09 KG/M2 | DIASTOLIC BLOOD PRESSURE: 70 MMHG | HEIGHT: 61 IN

## 2021-11-02 DIAGNOSIS — M06.00 SERONEGATIVE EROSIVE RHEUMATOID ARTHRITIS (HCC): ICD-10-CM

## 2021-11-02 DIAGNOSIS — L40.9 PSORIASIS: ICD-10-CM

## 2021-11-02 DIAGNOSIS — L40.50 PSORIATIC ARTHRITIS (HCC): Primary | ICD-10-CM

## 2021-11-02 DIAGNOSIS — Z79.899 HIGH RISK MEDICATION USE: ICD-10-CM

## 2021-11-02 PROCEDURE — 99215 OFFICE O/P EST HI 40 MIN: CPT | Performed by: INTERNAL MEDICINE

## 2021-11-02 RX ORDER — LEFLUNOMIDE 20 MG/1
20 TABLET ORAL DAILY
Qty: 30 TABLET | Refills: 3 | Status: SHIPPED | OUTPATIENT
Start: 2021-11-02 | End: 2022-02-10 | Stop reason: SDUPTHER

## 2021-11-02 RX ORDER — MELOXICAM 15 MG/1
15 TABLET ORAL DAILY
Qty: 30 TABLET | Refills: 3 | Status: SHIPPED | OUTPATIENT
Start: 2021-11-02 | End: 2022-02-10 | Stop reason: SDUPTHER

## 2021-11-02 RX ORDER — CLOBETASOL PROPIONATE 0.5 MG/G
OINTMENT TOPICAL 2 TIMES DAILY
Qty: 60 G | Refills: 3 | Status: SHIPPED | OUTPATIENT
Start: 2021-11-02 | End: 2022-02-10 | Stop reason: SDUPTHER

## 2021-11-08 ENCOUNTER — TELEPHONE (OUTPATIENT)
Dept: RHEUMATOLOGY | Facility: CLINIC | Age: 56
End: 2021-11-08

## 2021-11-10 DIAGNOSIS — L40.50 PSORIATIC ARTHRITIS (HCC): Primary | ICD-10-CM

## 2021-11-10 RX ORDER — ABATACEPT 125 MG/ML
INJECTION, SOLUTION SUBCUTANEOUS
Qty: 4 ML | Refills: 11 | Status: SHIPPED | OUTPATIENT
Start: 2021-11-10

## 2021-11-15 ENCOUNTER — TRANSCRIBE ORDERS (OUTPATIENT)
Dept: PAIN MEDICINE | Facility: CLINIC | Age: 56
End: 2021-11-15

## 2021-12-02 ENCOUNTER — OFFICE VISIT (OUTPATIENT)
Dept: OBGYN CLINIC | Facility: HOSPITAL | Age: 56
End: 2021-12-02
Payer: COMMERCIAL

## 2021-12-02 ENCOUNTER — HOSPITAL ENCOUNTER (OUTPATIENT)
Dept: RADIOLOGY | Facility: HOSPITAL | Age: 56
Discharge: HOME | End: 2021-12-02
Payer: COMMERCIAL

## 2021-12-02 VITALS
BODY MASS INDEX: 36.63 KG/M2 | HEART RATE: 93 BPM | HEIGHT: 61 IN | DIASTOLIC BLOOD PRESSURE: 77 MMHG | WEIGHT: 194 LBS | SYSTOLIC BLOOD PRESSURE: 117 MMHG

## 2021-12-02 DIAGNOSIS — R52 PAIN: ICD-10-CM

## 2021-12-02 DIAGNOSIS — S66.802A INJURY OF ULNAR COLLATERAL LIGAMENT OF LEFT WRIST, INITIAL ENCOUNTER: ICD-10-CM

## 2021-12-02 DIAGNOSIS — R52 PAIN: Primary | ICD-10-CM

## 2021-12-02 PROCEDURE — 73140 X-RAY EXAM OF FINGER(S): CPT

## 2021-12-02 PROCEDURE — 99214 OFFICE O/P EST MOD 30 MIN: CPT | Performed by: PHYSICIAN ASSISTANT

## 2021-12-03 ENCOUNTER — HOSPITAL ENCOUNTER (OUTPATIENT)
Dept: RADIOLOGY | Facility: HOSPITAL | Age: 56
Discharge: HOME | End: 2021-12-03
Payer: COMMERCIAL

## 2021-12-03 DIAGNOSIS — S66.802A INJURY OF ULNAR COLLATERAL LIGAMENT OF LEFT WRIST, INITIAL ENCOUNTER: ICD-10-CM

## 2021-12-03 PROCEDURE — 73218 MRI UPPER EXTREMITY W/O DYE: CPT

## 2021-12-03 PROCEDURE — G1004 CDSM NDSC: HCPCS

## 2021-12-08 ENCOUNTER — OFFICE VISIT (OUTPATIENT)
Dept: OBGYN CLINIC | Facility: HOSPITAL | Age: 56
End: 2021-12-08
Payer: COMMERCIAL

## 2021-12-08 VITALS
HEART RATE: 88 BPM | SYSTOLIC BLOOD PRESSURE: 138 MMHG | DIASTOLIC BLOOD PRESSURE: 86 MMHG | HEIGHT: 61 IN | BODY MASS INDEX: 39.57 KG/M2 | WEIGHT: 209.6 LBS

## 2021-12-08 DIAGNOSIS — M65.312 TRIGGER FINGER OF LEFT THUMB: Primary | ICD-10-CM

## 2021-12-08 DIAGNOSIS — S63.642A SPRAIN OF METACARPOPHALANGEAL (MCP) JOINT OF LEFT THUMB, INITIAL ENCOUNTER: ICD-10-CM

## 2021-12-08 PROCEDURE — 29075 APPL CST ELBW FNGR SHORT ARM: CPT | Performed by: ORTHOPAEDIC SURGERY

## 2021-12-08 PROCEDURE — 99214 OFFICE O/P EST MOD 30 MIN: CPT | Performed by: ORTHOPAEDIC SURGERY

## 2022-01-17 ENCOUNTER — TELEPHONE (OUTPATIENT)
Dept: OBGYN CLINIC | Facility: HOSPITAL | Age: 57
End: 2022-01-17

## 2022-01-17 NOTE — TELEPHONE ENCOUNTER
Spoke to patient  She stated that she took her cast off on Friday  She stated she got it wet and it was not drying  She just lost her father so she wasn't thinking before she got it wet  She stated she is using the Spica Splint she was given by Demetria Hi at the initial ortho visit  She doesn't know what to do because the thumb is the same as when she saw us last     Please advise if spica splint is acceptable to continue to use until seen Wednesday or if she needs to come in and have OIC put another fiberglass Skier cast on her

## 2022-01-17 NOTE — TELEPHONE ENCOUNTER
Patient calling in crying  From what I can understand, she removed her cast because it got wet  She has a f/u appt with Dr Marilynn Hodge this Wed 1/19/22      Callback [phone]

## 2022-01-19 ENCOUNTER — OFFICE VISIT (OUTPATIENT)
Dept: OBGYN CLINIC | Facility: HOSPITAL | Age: 57
End: 2022-01-19
Payer: COMMERCIAL

## 2022-01-19 VITALS
HEIGHT: 61 IN | SYSTOLIC BLOOD PRESSURE: 136 MMHG | HEART RATE: 82 BPM | BODY MASS INDEX: 39.6 KG/M2 | DIASTOLIC BLOOD PRESSURE: 86 MMHG

## 2022-01-19 DIAGNOSIS — M65.312 TRIGGER FINGER OF LEFT THUMB: Primary | ICD-10-CM

## 2022-01-19 DIAGNOSIS — S63.642A SPRAIN OF METACARPOPHALANGEAL (MCP) JOINT OF LEFT THUMB, INITIAL ENCOUNTER: ICD-10-CM

## 2022-01-19 PROCEDURE — 99214 OFFICE O/P EST MOD 30 MIN: CPT | Performed by: ORTHOPAEDIC SURGERY

## 2022-01-19 RX ORDER — CEFAZOLIN SODIUM 2 G/50ML
2000 SOLUTION INTRAVENOUS ONCE
Status: CANCELLED | OUTPATIENT
Start: 2022-03-17 | End: 2022-01-19

## 2022-01-19 NOTE — PROGRESS NOTES
ASSESSMENT/PLAN:    Assessment:   Left thumb UCL partial tear and trigger thumb    Plan:   Patient may transition out of her splint at this time  It was discussed with the patient that her bone UCL injury is healed at this time  We did encourage gentle range of motion of her left thumb at this time as tolerated  Conservative versus surgical treatment options were discussed in regards to her left trigger from today  Patient would like to proceed with surgical intervention consisting of a 90% lifetime success rate due to failed conservative treatment of immobilization for the past 6 weeks  Left trigger thumb release under sedation with post op therapy 3-5 days post op  Follow Up: After Surgery    To Do Next Visit:    and Sutures out    General Discussions:       Operative Discussions:     Trigger Finger Release: The anatomy and physiology of trigger finger was discussed with the patient today in the office  Edema and increased contact pressure within the flexor tendons at the A1 pulley can cause pain, crepitation, and limitation of function  Treatment options include resting MP blocking splints to decrease edema, oral anti-inflammatory medications, home or formal therapy exercises, up to 2 steroid injections or surgical release  While majority of patients do respond to conservative treatment, up to 20% may require surgical release  The patient has elected release of the trigger finger  The patient has elected to undergo a release of the A1 pulley (trigger finger)  A small incision will be made over the palmar aspect of the hand, the tendon sheath holding the flexor tendons will be released  In the postoperative period, light activities are allowed immediately, driving is allowed when narcotic medication has stopped, and the incision may get wet after 2 days  Heavy activities (lifting more than approximately 10 pounds) will be allowed after the follow up appointment in 1-2 weeks    While the pain and discomfort within the wrist typically improves rapidly, some residual discomfort may be present for up to 6 weeks  The nodule that is typically palpable in the palmar aspect of the hand will not be removed, as this would necessitate removal of a portion of the flexor tendon, however the catching, clicking, and locking should resolve  Approximate success rate is 98%  The risks and benefits of the procedure were explained to the patient, which include, but are not limited to: Bleeding, infection, recurrence, pain, scar, damage to tendons, damage to nerves, and damage to blood vessels, need for future surgery and complications related to anesthesia  If bony work is done, risks also include malunion and nonunion  These risks, along with alternative conservative treatment options, and postoperative protocols were voiced back and understood by the patient  All questions were answered to the patient's satisfaction  The patient agrees to comply with a standard postoperative protocol, and is willing to proceed  Education was provided via written and auditory forms  There were no barriers to learning  Written handouts regarding wound care, incision and scar care, and general preoperative information, as well as risks and benefits were provided to the patient     _____________________________________________________  CHIEF COMPLAINT:  Chief Complaint   Patient presents with    Left Thumb - Fracture         SUBJECTIVE:  Gosia Maria is a 61 y o  female who presents for follow up regarding L thumb UCL injury and left trigger thumb  Since last visit, Gosia Maria has tried casting with only partial relief  Today there is Stiffness/LROM to the left thumb      Radiation: Yes to the  thumb  Associated symptoms: Pain  Moderate  Intermittant  Sharp and Aching  To left hand    PAST MEDICAL HISTORY:  Past Medical History:   Diagnosis Date    Abdominal hernia     Anxiety     Arthritis     o a  left knee    Asthma     Brain injury [facility])     Short term memory problems; confusion; has affected depth perception," injury at work"    [facility]     CPAP (continuous positive airway pressure) dependence     Depression     Fatty liver     GERD (gastroesophageal reflux disease)     History of gestational diabetes     Was on insulin    History of laparoscopic adjustable gastric banding     History of recent fall     3/2018    Hypertension     Knee pain, right     Obese     Pneumonia     x1    RA (rheumatoid arthritis) ([facility]     Risk for falls     Shortness of breath     "sometimes with asthma"    Shoulder pain, bilateral     pain started after a fall in     Sleep apnea     Vomiting        PAST SURGICAL HISTORY:  Past Surgical History:   Procedure Laterality Date    ANAL FISSURECTOMY       SECTION      x1    ELBOW SURGERY Left     FOOT SURGERY Bilateral     5th toes    LAPAROSCOPIC GASTRIC BANDING  2010    Dr Sylwia Allen, Dia Arzola    [place] EGD TRANSORAL BIOPSY SINGLE/MULTIPLE N/A 1/3/2018    Procedure: ESOPHAGOGASTRODUODENOSCOPY (EGD) with biopsy;  Surgeon: Baldemar Jacobson MD;  Location: [facility];   Service: Bariatrics    REPLACEMENT TOTAL KNEE BILATERAL      SKIN BIOPSY         FAMILY HISTORY:  Family History   Problem Relation Age of Onset    Diabetes type II Mother     Hypertension Mother     Arthritis Mother     Congenital heart disease Mother    [facility] Stroke Mother     Diabetes Mother     Diabetes type II Father     Hypertension Father     Asthma Father     Diabetes Father     Arthritis Father     Sudden death Other 28        niece    Asthma Sister     Diabetes Sister     Arthritis Sister     Asthma Brother     Diabetes Brother     Seizures Son     Asthma Maternal Aunt        SOCIAL HISTORY:  Social History     Tobacco Use    Smoking status: Former Smoker     Packs/day: 0 15     Years: 3 00     Pack years: 0 45     Types: Cigarettes Quit date: 2020     Years since quittin 1    Smokeless tobacco: Never Used   Vaping Use    Vaping Use: Never used   Substance Use Topics    Alcohol use: Not Currently    Drug use: No       MEDICATIONS:    Current Outpatient Medications:     albuterol (2 5 mg/3 mL) 0 083 % nebulizer solution, Take 1 vial (2 5 mg total) by nebulization every 6 (six) hours as needed for wheezing, Disp: 150 mL, Rfl: 3    albuterol (PROVENTIL HFA,VENTOLIN HFA) 90 mcg/act inhaler, Inhale 2 puffs 4 (four) times a day As directed, Disp: 3 Inhaler, Rfl: 1    budesonide-formoterol (SYMBICORT) 160-4 5 mcg/act inhaler, Inhale 2 puffs 2 (two) times a day, Disp: , Rfl:     buPROPion (WELLBUTRIN SR) 150 mg 12 hr tablet, 2 am, 1 pm (Patient taking differently: 2 (two) times a day ), Disp: 90 tablet, Rfl: 5    clobetasol (TEMOVATE) 0 05 % ointment, Apply topically 2 (two) times a day, Disp: 60 g, Rfl: 3    diphenhydrAMINE (BENADRYL) 50 mg capsule, Take 50 mg by mouth every 6 (six) hours as needed, Disp: , Rfl:     fluticasone (FLONASE) 50 mcg/act nasal spray, 1 spray into each nostril as needed  , Disp: , Rfl:     leflunomide (ARAVA) 20 MG tablet, Take 1 tablet (20 mg total) by mouth daily, Disp: 30 tablet, Rfl: 3    levalbuterol (XOPENEX HFA) 45 mcg/act inhaler, Inhale 1-2 puffs every 4 (four) hours as needed for wheezing, Disp: 1 Inhaler, Rfl: 5    montelukast (SINGULAIR) 10 mg tablet, TAKE 1 TABLET BY MOUTH DAILY AT BEDTIME, Disp: 90 tablet, Rfl: 0    mupirocin (BACTROBAN) 2 % ointment, Apply twice daily to open sores, Disp: 22 g, Rfl: 3    nystatin (MYCOSTATIN) powder, Apply topically 3 (three) times a day, Disp: 60 g, Rfl: 3    triamcinolone (KENALOG) 0 1 % ointment, Apply topically 2 (two) times a day, Disp: 454 g, Rfl: 1    valsartan-hydrochlorothiazide (DIOVAN-HCT) 160-25 MG per tablet, Take 1 tablet by mouth daily, Disp: 90 tablet, Rfl: 1    Abatacept (Orencia ClickJect) 017 MG/ML SOAJ, Inject 125 mg SC weekly (Patient not taking: Reported on 12/8/2021 ), Disp: 4 mL, Rfl: 11    acetaminophen (TYLENOL) 650 mg CR tablet, Take 650 mg by mouth every 12 (twelve) hours (Patient not taking: Reported on 8/3/2021), Disp: , Rfl:     diclofenac sodium (VOLTAREN) 1 %, Apply 4 g topically 4 (four) times a day as needed (pain) (Patient not taking: Reported on 8/3/2021), Disp: 300 g, Rfl: 6    Diclofenac Sodium (VOLTAREN) 1 %, Apply 2 g topically 4 (four) times a day (Patient not taking: Reported on 8/3/2021), Disp: 100 g, Rfl: 6    doxycycline (ADOXA) 100 MG tablet, TAKE 1 TABLET BY MOUTH TWICE A DAY FOR 10 DAYS (Patient not taking: Reported on 11/2/2021), Disp: , Rfl:     hydrOXYzine HCL (ATARAX) 25 mg tablet, TAKE 1 TABLET (25 MG TOTAL) BY MOUTH EVERY 6 (SIX) HOURS AS NEEDED FOR ITCHING (Patient not taking: Reported on 7/28/2020), Disp: 120 tablet, Rfl: 0    meloxicam (Mobic) 15 mg tablet, Take 1 tablet (15 mg total) by mouth daily (Patient not taking: Reported on 12/8/2021 ), Disp: 30 tablet, Rfl: 3    methocarbamol (ROBAXIN) 750 mg tablet, Take 750 mg by mouth every 6 (six) hours as needed (Patient not taking: Reported on 8/3/2021), Disp: , Rfl:     mupirocin (BACTROBAN) 2 % ointment, Apply topically 2 (two) times a day (Patient not taking: Reported on 11/2/2021), Disp: 22 g, Rfl: 0    SPIRIVA RESPIMAT 1 25 MCG/ACT AERS inhaler, INHALE 2 INHALATIONS BY MOUTH DAILY (Patient not taking: Reported on 12/9/2019), Disp: 3 Inhaler, Rfl: 1    spironolactone (ALDACTONE) 25 mg tablet, Take 25 mg by mouth as needed (Patient not taking: Reported on 11/2/2021), Disp: , Rfl:     terbinafine (LAMISIL AT) 1 % cream, Apply topically to feet twice a day (Patient not taking: Reported on 6/10/2021), Disp: 60 g, Rfl: 3    traMADol (ULTRAM) 50 mg tablet, Take  mg by mouth every 6 (six) hours as needed (Patient not taking: Reported on 8/3/2021), Disp: , Rfl:     triamcinolone (KENALOG) 0 1 % ointment, APPLY TOPICALLY TWICE A DAY FOR 2 WEEKS (Patient not taking: Reported on 6/10/2021), Disp: 454 g, Rfl: 0    ALLERGIES:  Allergies   Allergen Reactions    Sulfasalazine Rash     rash    Other Rash     Adhesive tape and adhesive bandaids    Wound Dressing Adhesive Other (See Comments)     rash, itchy    Adalimumab Itching and Rash    Taltz [Ixekizumab] Itching, Swelling and Rash       REVIEW OF SYSTEMS:  Pertinent items are noted in HPI      LABS:  HgA1c:   Lab Results   Component Value Date    HGBA1C 5 8 (H) 09/17/2021     BMP:   Lab Results   Component Value Date    GLUCOSE 85 08/14/2015    CALCIUM 9 2 05/28/2020     08/14/2015    K 3 6 05/28/2020    CO2 29 05/28/2020     05/28/2020    BUN 12 05/28/2020    CREATININE 0 86 05/28/2020           _____________________________________________________  PHYSICAL EXAMINATION:  Vital signs: /86   Pulse 82   Ht 5' 1" (1 549 m)   BMI 39 60 kg/m²   General: well developed and well nourished, alert, oriented times 3 and appears comfortable  Psychiatric: Normal  HEENT: Trachea Midline, No torticollis  Cardiovascular: No discernable arrhythmia  Pulmonary: No wheezing or stridor  Abdomen: No rebound or guarding  Extremities: No peripheral edema  Skin: No Erythema  Neurovascular: Sensation Intact to the Median, Ulnar, Radial Nerve, Motor Intact to the Median, Ulnar, Radial Nerve and Pulses Intact    MUSCULOSKELETAL EXAMINATION:  LEFT SIDE:  thumb: no instabilty to thumb UCL, ttp A1 pulley of thumb, palpable nodule, no active flexion at IP jt  passive flexion at IP jt intact     _____________________________________________________  STUDIES REVIEWED:  No Studies to review      PROCEDURES PERFORMED:  Procedures  No Procedures performed today   Scribe Attestation    I,:  Ricardo Barreto am acting as a scribe while in the presence of the attending physician :       I,:  Jearldine Goodell, MD personally performed the services described in this documentation    as scribed in my presence :

## 2022-01-19 NOTE — PATIENT INSTRUCTIONS
[facility]  Serina Abarca MD  Chief of [address] [place] [address], [address] [place] [address]  [phone]  You have chosen to undergo surgery for your condition  Any surgery is associated with risks of potential complications  Certain medical problems such as smoking, diabetes, thyroid disease, neuropathy, malnutrition or bleeding problems may increase your risk of complications  Complications after surgery are rare but include the following:   Bleeding Infection   Scar Pain   Tenderness Problems with healing   Damage to nerves Damage to blood vessels   Lack of desired results Need for further surgery   Numbness Stiffness   Problems with anesthesia Blood clots   Need for hardware removal (if inserted)    If bony work is done, other risks include:   Delayed bone healing   Lack of bone healing   Bones healing in wrong position    While these risks and complications are rare and infrequent they are still important to know and discuss  If you have any other questions, please let me know  _____________________________________________________________________________________  It can be difficult, but it is possible to get on with your life with only one hand for the first few weeks after your operation  The following are a few suggestions that may be helpful when you're recovering from your hand problem or from your hand surgery  While most of these suggestions are really only applicable if your dominant or your writing hand is affected, some apply to problems involving either hand  Most daily activities can be accomplished with some modifications, rather than the need for store bought devices  Before surgery, if you can:   Ask for help: Have others help you with: childcare, housework, and meals   Practice: Dressing, undressing, using the toilet, brushing your teeth, showering   Prepare: for the first few days after surgery    o Open and re-sealed cans and bottles that you may need   o Open medication containers and leave them easy-to-read open  Make sure you put these medication containers out of reach of children, even if you don't expect children to visit you   o Prepare and think about no-cutmeals-sandwiches, ground meats, etc     It helps to have   In the shower  o Plastic bags and rubber bands to cover bandages-the dang that newspapers come in are good  Also, small trashcan liners will work  Use 2 of these at a time  The other option is an oversized rubber glove that may be purchased at a food store to aid in dishwashing   o A bottle sponge (soft sponge on a long stick)-for the armpit of yourgood hand  o Shower brush  o At [place] in the shower will help you to wash your hair  o A cotton terrycloth bathrobe to aid you in drying your back     In the bathroom  o Toothpaste, shampoo, etc  in a flip top or pump dispenser  o Flossers (dental floss on aYshaped handle)  o Consider an electric razor     In the bedroom  o Back scratcher  o Large sleeve shirts and tops  o Put away clothing which buttons, fastens or snaps in the back, or which uses drawstrings     In the kitchen  o A rubber jar opener Tai-to help open jars, but also to keep things from sliding around while you are working on them   o Double suction cup pads (the little octopus)-to hold items while you use or wash them  o An electric can opener with a lid magnet strong enough to hold the can in the air-for one-handed use   Consider Luly Shines and wear haircut  Jesse Hirschfeld! Incision & Scar Care   You should keep your bandages dry and clean; change your dressings if you see drainage coming through your dressings   Signs of infection include increased pain, swelling, redness, warmth, and excessive or foul-smelling drainage  Please contact our office if you experience signs of infection   You may wash with soap and water after given permission     Sutures will be removed between 7-14 days after surgery if the wound is healed  Patients who smoke, have diabetes, or have nutritional problems may need longer to heal    Steri-strips may be placed across your incision at this time, which will fall off or peel away   To help prevent infection, do not submerse your incision area for 2-3 weeks (i e  no hot tubs, pools, ocean, dish water, fish ponds, fish tanks, bathtubs)   Swelling, bruising, and numbness are common after surgery  To help reduce these symptoms, keep the area elevated  Scar Care: To improve the appearance of your scar, you can massage the healed area (using circular motions with your fingertip) for 5 minutes 3x a day after your steri-strips peel away  You can use regular hand lotion or Scar zone from the store  You may also use Silicone pads after the stitches are removed (available at the store)  Redness and bumpiness of the scar are expected  These generally improve as healing progresses, but redness can be expected for up to 6-8 months  ** If you have any questions or concerns, please call our office  [phone]  _____________________________________________________________________________________  Pain Management  Pain after an injury or surgery is common and should often be expected  There are many ways to manage and reduce this pain  This often does not include medications or may not exclusively include medication  Each patient, surgery and surgeon are unique, and the approach to management of pain is individual   It is important to try to discuss your concerns and expectations regarding pain with your surgical team before and after surgery  They want to help you get better and have a good patient experience  Your patient experience includes understanding and treating your pain  Here's what you may expect before surgery and how to manage your pain and medications after surgery   Again, the approach to pain management after injury or surgery is individualized, and this is general information  Your surgical team will have more specific recommendations for you  Before using any of the methods explored here, please discuss with your medical team if these pain management methods are appropriate for you  We advise good communication with your team to let them help you achieve the best outcome  Surgery Day  As your surgery begins, your surgeon and anesthesia team may give you medications by mouth, IV, and/or injection  Giving you medication as the surgery progresses not only helps you to decrease pain during the surgery, but it also reduces your pain post-surgery  You may also receive medication in the recovery room after surgery, if needed  In some cases, you will receive prescription pain medication and instructions for its use to use at home in the days following your surgery  You may also receive instructions on using over-the-counter pain medications  It is important to follow these directions carefully, as many over-the counter medications contain some of the same ingredients as prescription pain medications and using them together can result in a dangerous accidental overdose  Post-Surgery Pain Management  While always important to follow your specific postoperative instructions, here are some different methods, outside of medication, that your team may recommend to reduce your pain:   Elevate: Elevating the injured area so it is higher than your heart can reduce swelling and pain  Swelling can increase quickly by putting your hand at your side, and this can make your dressing feel tight  Often, the pain associated with swelling is difficult to control, so it is best to avoid this problem   Take care of your dressing: If your dressing/splint feels tight, and elevation for 10 minutes does not improve the tight sensation, contact your surgical team  It may be recommended that you unravel any tape or elastic wrap and loosen the outer bandage   If this does not help, you may be advised to tear, unravel, or cut the inner layers with blunt tipped scissors  Make sure you are cutting on the opposite side of where your incision is located  When done, you will need to try to rebuild your dressing to keep your wound clean and covered  Before doing any of this, check with your medical team  They may want to be aware of the tight dressing and could have different instructions for loosening the dressing   Keep moving: If allowed by your surgeon, try to frequently move the fingers, wrist, elbow, and/or shoulder that are outside of the splint or cast  You can do this gently and slowly  This improves blood flow, which limits swelling and prevents bandages from feeling tight  It may be uncomfortable to move at first, but the discomfort will often improve with time and frequently improves with motion  Your surgeon will be more specific about what to move and what to rest    Ice the area: Icing the painful area will typically reduce swelling and inflammation and reduce pain  However, there may be certain procedures (such as surgery on arteries, skin grafts or flaps) where ice could be harmful, so consult your surgeon before using ice   Heat the area: If you are in the phase of care where you can remove your dressing or splint, you may be able to try heat  Heat increases blood flow to an area and can help with muscle spasms, muscle soreness and joint pain   Avoid smoking: Chemicals present in cigarettes can increase pain  Reducing or quitting smoking can improve your pain   Consume vitamin C: Consuming 500mg of vitamin C daily for 6 weeks may reduce pain after some injuries  However, it is ascorbic acid, which can upset your stomach if you have heartburn or gastritis  Post-Surgery Medication Management  The pain-management methods listed above are often effective when used in combination with taking medications post-surgery   There are many different classes of medication that can help pain  Some can be purchased over the counter, and some require a prescription  All medicines can have some benefits and some adverse reactions/side effects  Your surgical team will balance these issues to provide a plan for you  Some commonly prescribed medications can include:   Tylenol (Acetaminophen)   Aleve (Naprosyn/naproxen)   Motrin/Advil (Ibuprofen)   Celebrex (Celecoxib)   Toradol (Ketorolac)    When taking medication, keep the following in mind:   It may take 30-60 minutes for your body to absorb the medication after you take it by mouth, so be patient   Longer-acting medications used before bedtime may help you sleep better the first few nights after surgery   The first few nights post-surgery will generally be the toughest    Do not exceed the dose recommended by your physician or combine medications without consulting with your physician  If you are unfamiliar with these medications, your surgeon can specify how much medication you should take, for how long, and how often  Opioids  Opioids are a type of pain medication made from the poppy plant that is used to make opium and heroin  They can be effective in treating pain, but opioids should be used at a last resort, in limited amounts, and for a limited number of days  Use of these medications should only be done under the guidance of your doctor  When taking opioids, you are at risk of becoming dependent on the medicine, and they may become less effective over time  Oxycodone and hydrocodone are two of the most commonly used and effective opioid pain pills  These pills are frequently combined with Tylenol (acetaminophen), but it must be done carefully  Be sure to consult your surgeon before doing so   Your surgeon will give you a customized plan for managing your pain based on your type of surgery, number of procedures, duration of surgery, etc  Keep in mind that many opioids are combined with Tylenol (acetaminophen) already in the pill, so take care to follow your prescribed directions and not to take more opioids or acetaminophen than prescribed  Overdoses of each of these medications can be dangerous and life threatening  Learn more about opioids, including the side effects, how to safely use them, and how to properly dispose of any extras  Following the program below will greatly decrease your post-operative pain  1  Aleve (naproxen) 220 mg and Tylenol Arthritis 650 mg on the afternoon/evening of surgery  Do NOT take Aleve if you have a history of gastric ulcers, uncontrolled reflux or have been told previously by a physician that you should not take anti-inflammatory medications such as Advil/Aleve/Motrin  2  Aleve (naproxen) 220 mg in the morning and afternoon, for about 2-3 days after the surgery; even if you have no pain  You can stop two days after surgery if your hand does not hurt  3  Tylenol Arthritis (or any brand of acetaminophen 8-hour), 650 mg every eight hours, with a maximum dose of 3000 mg per day, for about 2-3 days after the surgery, even if you have no pain  Tylenol Arthritis plus Aleve is a case of 1+1=3, not 2  That is, they work together as a team to make each other stronger a  The maximum amount of Tylenol is 3000 mg per day, which is the same as 4 of the 650 mg pills  Remember; don't substitute any other medication for the Tylenol: don't take Motrin, aspirin, or any other over the counter medication  It must be Tylenol (or any brand of acetaminophen) for it to work as a team  Remember as well that Tylenol Arthritis is taken every 8 hours, the Aleve is twice a day  4  Norco (hydrocodone/acetaminophen) 5/325 mg or a similar medication to assist with sleeping at night, and possibly every 6 hours during the day, ONLY IF NEEDED, for the first few days   You will be given a written prescription, but many patients find they do not need to take any or all of the Norco  Do not take the medication just because it was given to you; only take it if you need it  Remember that Bernice First is an opioid pain medication and can lead to addiction, respiratory sedation, and death  In 2015 over 17,500 Americans  from opioid overdoses and I don't want this to happen to you  Opioid medications can also cause constipation, so please plan for that, as well as possible mental confusion and drowsiness  Do not drive while you are taking this medication  With this protocol, you can expect your post-operative pain to be very manageable  The worst pain only lasts for the first 48 hours and improves significantly after that  By the time you see your surgeon for your post-operative visit you probably will no longer require any pain medication on a daily basis  Important Information about Painkillers  You are being prescribed an opioid pain medication to help with severe pain after surgery  Use the medication sparingly as needed to reduce your pain  The goal is not to be pain-free, but to make the pain more tolerable  If you are able to take non-steroidal anti-inflammatory drugs (NSAIDs), alternate the prescription pain medication with over-the-counter Ibuprofen or Naproxen (if you do not have a history of reflux or stomach ulcers)  This will allow you to take less opioid medication  Also, elevate the hand to reduce swelling and consider applying ice to the affected area for 15 minutes at a time, several times per day  Opioid medication is powerful and has the risk of overdose, abuse, and addiction  Only use the medication as directed by your physician and keep the pills in a safe place  When you no longer need the medication, please dispose of the pills properly as directed below  Allowing someone else to use your opioid prescription is illegal   Also, possible side effects from opioid medications are over-sedation, itching, nausea/vomiting, and constipation   Do not drive a vehicle or operate machinery while taking the pain medications  Drink plenty of fluids and consider a stool softener to prevent constipation  If the pain you are experiencing is not severe, stop taking the opioid pills and only take over-the-counter medications such as Tylenol and Ibuprofen  Disposing of unused pain medications:  (1) Follow pharmacist instructions on the bottle if available, or  (2) Call 1-100.356.6363 for a [facility] authorized collection site in your area, or  (3) If no collection site is available in your area, mix the pills with an undesirable substance such as used coffee grounds, eugene litter, or dirt  Place this mixture in a sealed plastic bag  Place the bag in the household garbage  Adapted from the opioid awareness section of the [employer], thanks to Rosa Dalton and Evaristo Primes

## 2022-02-10 ENCOUNTER — OFFICE VISIT (OUTPATIENT)
Dept: RHEUMATOLOGY | Facility: CLINIC | Age: 57
End: 2022-02-10
Payer: COMMERCIAL

## 2022-02-10 ENCOUNTER — APPOINTMENT (OUTPATIENT)
Dept: LAB | Facility: MEDICAL CENTER | Age: 57
End: 2022-02-10
Payer: COMMERCIAL

## 2022-02-10 VITALS
HEIGHT: 61 IN | TEMPERATURE: 97.2 F | SYSTOLIC BLOOD PRESSURE: 110 MMHG | BODY MASS INDEX: 39.6 KG/M2 | HEART RATE: 82 BPM | DIASTOLIC BLOOD PRESSURE: 78 MMHG

## 2022-02-10 DIAGNOSIS — R21 RASH: ICD-10-CM

## 2022-02-10 DIAGNOSIS — M06.00 SERONEGATIVE EROSIVE RHEUMATOID ARTHRITIS (HCC): ICD-10-CM

## 2022-02-10 DIAGNOSIS — L40.9 PSORIASIS: ICD-10-CM

## 2022-02-10 DIAGNOSIS — Z79.899 HIGH RISK MEDICATION USE: ICD-10-CM

## 2022-02-10 DIAGNOSIS — L40.50 PSORIATIC ARTHRITIS (HCC): Primary | ICD-10-CM

## 2022-02-10 DIAGNOSIS — R79.89 LOW VITAMIN D LEVEL: ICD-10-CM

## 2022-02-10 LAB
25(OH)D3 SERPL-MCNC: 18.2 NG/ML (ref 30–100)
ALBUMIN SERPL BCP-MCNC: 3.6 G/DL (ref 3.5–5)
ALP SERPL-CCNC: 76 U/L (ref 46–116)
ALT SERPL W P-5'-P-CCNC: 60 U/L (ref 12–78)
ANION GAP SERPL CALCULATED.3IONS-SCNC: 6 MMOL/L (ref 4–13)
AST SERPL W P-5'-P-CCNC: 23 U/L (ref 5–45)
BASOPHILS # BLD AUTO: 0.08 THOUSANDS/ΜL (ref 0–0.1)
BASOPHILS NFR BLD AUTO: 1 % (ref 0–1)
BILIRUB SERPL-MCNC: 0.41 MG/DL (ref 0.2–1)
BUN SERPL-MCNC: 16 MG/DL (ref 5–25)
CALCIUM SERPL-MCNC: 9.5 MG/DL (ref 8.3–10.1)
CHLORIDE SERPL-SCNC: 107 MMOL/L (ref 100–108)
CO2 SERPL-SCNC: 28 MMOL/L (ref 21–32)
CREAT SERPL-MCNC: 0.74 MG/DL (ref 0.6–1.3)
CRP SERPL QL: 3.8 MG/L
EOSINOPHIL # BLD AUTO: 0.31 THOUSAND/ΜL (ref 0–0.61)
EOSINOPHIL NFR BLD AUTO: 5 % (ref 0–6)
ERYTHROCYTE [DISTWIDTH] IN BLOOD BY AUTOMATED COUNT: 14.6 % (ref 11.6–15.1)
ERYTHROCYTE [SEDIMENTATION RATE] IN BLOOD: 32 MM/HOUR (ref 0–29)
GFR SERPL CREATININE-BSD FRML MDRD: 90 ML/MIN/1.73SQ M
GLUCOSE P FAST SERPL-MCNC: 86 MG/DL (ref 65–99)
HCT VFR BLD AUTO: 40.3 % (ref 34.8–46.1)
HGB BLD-MCNC: 12.6 G/DL (ref 11.5–15.4)
IMM GRANULOCYTES # BLD AUTO: 0.02 THOUSAND/UL (ref 0–0.2)
IMM GRANULOCYTES NFR BLD AUTO: 0 % (ref 0–2)
LYMPHOCYTES # BLD AUTO: 1.63 THOUSANDS/ΜL (ref 0.6–4.47)
LYMPHOCYTES NFR BLD AUTO: 24 % (ref 14–44)
MCH RBC QN AUTO: 27.8 PG (ref 26.8–34.3)
MCHC RBC AUTO-ENTMCNC: 31.3 G/DL (ref 31.4–37.4)
MCV RBC AUTO: 89 FL (ref 82–98)
MONOCYTES # BLD AUTO: 0.58 THOUSAND/ΜL (ref 0.17–1.22)
MONOCYTES NFR BLD AUTO: 9 % (ref 4–12)
NEUTROPHILS # BLD AUTO: 4.16 THOUSANDS/ΜL (ref 1.85–7.62)
NEUTS SEG NFR BLD AUTO: 61 % (ref 43–75)
NRBC BLD AUTO-RTO: 0 /100 WBCS
PLATELET # BLD AUTO: 314 THOUSANDS/UL (ref 149–390)
PMV BLD AUTO: 10.5 FL (ref 8.9–12.7)
POTASSIUM SERPL-SCNC: 3.8 MMOL/L (ref 3.5–5.3)
PROT SERPL-MCNC: 7.2 G/DL (ref 6.4–8.2)
RBC # BLD AUTO: 4.54 MILLION/UL (ref 3.81–5.12)
SODIUM SERPL-SCNC: 141 MMOL/L (ref 136–145)
WBC # BLD AUTO: 6.78 THOUSAND/UL (ref 4.31–10.16)

## 2022-02-10 PROCEDURE — 86618 LYME DISEASE ANTIBODY: CPT | Performed by: INTERNAL MEDICINE

## 2022-02-10 PROCEDURE — 85652 RBC SED RATE AUTOMATED: CPT | Performed by: INTERNAL MEDICINE

## 2022-02-10 PROCEDURE — 82306 VITAMIN D 25 HYDROXY: CPT | Performed by: INTERNAL MEDICINE

## 2022-02-10 PROCEDURE — 85025 COMPLETE CBC W/AUTO DIFF WBC: CPT | Performed by: INTERNAL MEDICINE

## 2022-02-10 PROCEDURE — 99215 OFFICE O/P EST HI 40 MIN: CPT | Performed by: INTERNAL MEDICINE

## 2022-02-10 PROCEDURE — 36415 COLL VENOUS BLD VENIPUNCTURE: CPT | Performed by: INTERNAL MEDICINE

## 2022-02-10 PROCEDURE — 86140 C-REACTIVE PROTEIN: CPT | Performed by: INTERNAL MEDICINE

## 2022-02-10 PROCEDURE — 80053 COMPREHEN METABOLIC PANEL: CPT | Performed by: INTERNAL MEDICINE

## 2022-02-10 RX ORDER — LEFLUNOMIDE 20 MG/1
20 TABLET ORAL DAILY
Qty: 30 TABLET | Refills: 3 | Status: SHIPPED | OUTPATIENT
Start: 2022-02-10

## 2022-02-10 RX ORDER — MELOXICAM 15 MG/1
15 TABLET ORAL DAILY
Qty: 30 TABLET | Refills: 3 | Status: SHIPPED | OUTPATIENT
Start: 2022-02-10 | End: 2022-03-14 | Stop reason: ALTCHOICE

## 2022-02-10 RX ORDER — CYCLOSPORINE 100 MG/1
CAPSULE, GELATIN COATED ORAL
Qty: 14 CAPSULE | Refills: 0 | Status: SHIPPED | OUTPATIENT
Start: 2022-02-10 | End: 2022-02-22

## 2022-02-10 RX ORDER — CLOBETASOL PROPIONATE 0.5 MG/G
OINTMENT TOPICAL 2 TIMES DAILY
Qty: 60 G | Refills: 3 | Status: SHIPPED | OUTPATIENT
Start: 2022-02-10

## 2022-02-10 NOTE — PATIENT INSTRUCTIONS
Do labs today  Continue leflunomide 20mg daily  Start meloxicam daily at bedtime  Continue Orencia weekly injections  Continue daily over the counter Vit  D 2,000 units a day  Continue clobetasol ointment as needed for psoriasis  Start cyclosporine if start having psoriasis flare - take 1 twice a day for 2 days, then 1 daily until flare resolves    Return to clinic in 3 months    Psoriatic Arthritis in Adults    What is psoriatic arthritis? -- Psoriatic arthritis is a condition that causes joint pain, swelling, and stiffness  It happens in people who have a long-term skin condition called psoriasis  People with psoriasis have patches of thick, red skin that are often covered by silver or white scales  Doctors don't know what causes psoriasis or psoriatic arthritis  What are the symptoms of psoriatic arthritis? -- Psoriatic arthritis causes pain, stiffness, and swelling in the affected joints  It can also affect the spine in some people  Because of the joint and spine problems, people can have trouble moving their body  Stiffness in the joints or low back is usually worse in the morning and lasts 30 minutes or longer  It usually gets better with exercise  Psoriatic arthritis can affect joints on one or both sides of the body  It usually affects more than one joint  In addition to joint symptoms (and the skin symptoms of psoriasis), people sometimes have other symptoms  These can include:  ? Swelling of a finger or toe, or the hands or feet  ? Swelling and pain in the back of the ankle or in the heel   ? Nail symptoms - The nails can look "pitted," as if they were pricked by a pin  The nail can also come up off the nail bed  ? Eye pain or redness  Is there a test for psoriatic arthritis? -- Yes  Your doctor or nurse will ask about your symptoms and do an exam  He or she will order X-rays of your painful joints  He or she might order an imaging test called an MRI   Imaging tests create pictures of the inside of the body   To check that another condition isn't causing your symptoms, your doctor or nurse might also order:  ?Blood tests  ? Lab tests on a sample of fluid from a swollen joint - To get a sample of fluid, the doctor will put a thin needle in your joint  How is psoriatic arthritis treated? -- There is no cure for psoriatic arthritis, but different treatments can help ease and control symptoms  Treatment for joint symptoms usually involves one or more of the following:  ?Medicines called nonsteroidal antiinflammatory drugs, or "NSAIDs" for short - Examples of NSAIDs are aspirin, ibuprofen (sample brand names: Advil, Motrin), and naproxen (sample brand name: Aleve)  ? Medicines that are usually used to treat other types of arthritis - Some of these include methotrexate and leflunomide  ? Medicines that block a substance called tumor necrosis factor, or "TNF" for short - TNF plays a role in psoriasis and psoriatic arthritis  Medicines that block TNF are called "anti-TNF" medicines  Examples include etanercept (brand name: Enbrel) and adalimumab (brand name: Humira)  ?Other medicines - If the options above don't help, your doctor might suggest trying a different medicine  Examples include ustekinumab (brand name: Susie Dede), secukinumab (brand name: Cosentyx), tofacitinib (brand name: Kellen Vargas), abatacept (brand name: Keira Cunningham), and apremilast (brand name: Rosey Sims)  ? Shots of medicines called steroids that go into the painful joint - These are not the same as the steroids some athletes take illegally  These steroids help reduce swelling and pain  ? Heat - Heat, especially in the morning, can help reduce pain and stiffness  Do not use heat for longer than 20 minutes at a time  Also, do not use anything too hot that could burn your skin  ? Physical and occupational therapy - This involves learning exercises, movements, and ways of doing everyday tasks  ? Special shoe inserts (called "orthotics") - These can help keep your feet, ankles, and knees in the proper position  ?Treatment for psoriatic arthritis is usually long term  That's because even after symptoms get better, they sometimes return later on  Is there anything I can do on my own to feel better? -- Yes  It is very important that you stay active  You might want to avoid being active because you are in pain  But this can make things worse  It can make your muscles weak and your joints stiffer than they already are  Your doctor, nurse, or physical therapist can help you figure out which activities and exercises are right for you

## 2022-02-10 NOTE — PROGRESS NOTES
Assessment and Plan: Kevin Hill is a 61 y o  female who presents for follow-up of her psoriatic arthritis (is more likely than seronegative Rheumatoid arthritis)  Patient was started on Orensia for  about 3 month ago  Her leflunomide dose was increased last visit  Patient tolerates medications well now  She was on Taltz before that did not keep her symptoms under control  She previously had side effects on methotrexate, humira, hydrochloroquine, sulfasalazine  She currently has no swelling, redness in her joints, however she continues to have moderate knee pain when she walks  She uses voltaren gel topically  She was recommended to take meloxcame as needed for her pain  She has psoriatic rash on her face and ears  I recommended to use triamcinolone cream for her face  She continues to have blistering rash around her knees and back  Patient was seen by dermatologist who recommended biopsy  Patient mentions numbness on her palms bilaterally  Recommended to get splint for the wrist and wear at night  Follow up in 3 month      Plan:  Diagnoses and all orders for this visit:    Psoriatic arthritis ([facility] )  -     Cancel: CBC and differential  -     Cancel: Comprehensive metabolic panel  -     Cancel: C-reactive protein  -     Cancel: Sedimentation rate, automated  -     Vitamin D 25 hydroxy  -     cycloSPORINE non-modified (SandIMMUNE) 100 mg capsule; Take 1 capsule (100 mg total) by mouth 2 (two) times a day for 2 days, THEN 1 capsule (100 mg total) daily for 10 days  Start if in psoriasis flare   -     meloxicam (Mobic) 15 mg tablet; Take 1 tablet (15 mg total) by mouth daily As needed for joint pain    Seronegative erosive rheumatoid arthritis ([facility] )  -     leflunomide (ARAVA) 20 MG tablet; Take 1 tablet (20 mg total) by mouth daily  -     meloxicam (Mobic) 15 mg tablet;  Take 1 tablet (15 mg total) by mouth daily As needed for joint pain    Low vitamin D level  -     Vitamin D 25 hydroxy    Psoriasis  - Cancel: CBC and differential  -     Cancel: C-reactive protein  -     Cancel: Sedimentation rate, automated  -     cycloSPORINE non-modified (SandIMMUNE) 100 mg capsule; Take 1 capsule (100 mg total) by mouth 2 (two) times a day for 2 days, THEN 1 capsule (100 mg total) daily for 10 days  Start if in psoriasis flare   -     clobetasol (TEMOVATE) 0 05 % ointment; Apply topically 2 (two) times a day    Rash    High risk medication use     High risk medication use - Benefits and risks of leflunomide use, including but not limited to gastrointestinal disturbances such as nausea, diarrhea, stomatitis, hair loss, fatigue, leukopenia, and hepatotoxicity were discussed with the patient  CBC, CMP will be monitored regularly  Benefits and risks of biologics such Orencia reviewed with patient, and include but are not limited to reactivation of hepatitis B/C or TB, infusion or site reactions, non-melanoma malignancy, and increased risk of infections (though less likely than other biologics)  A baseline CBC, CMP, Hepatitis B/C status, and TB test were already checked  Patient will need CBC, CMP every 2 to 4 months and a CBC with infection  Follow-up plan: Return to clinic in 3 months        Rheumatic Disease Summary:  Shabana Edmonds a 63 y o  female who originally presented on 9/16/19 as a Rheumatology consult referred by her Chris Guevara MD for evaluation and management of her seronegative Rheumatoid arthritis  Patient had a DAS28 score of 3 32, consistent with moderate disease activity  She was having active inflammatory arthritis despite being on prednisone 10mg po daily, which she cannot stay on long-term without being tapered  Patient had already been tried on methotrexate and sulfasalazine DMARD therapy, both of which caused patient to develop a rash   It was deemed appropriate to pursue treatment with a biologic medication such as Humira to better control her disease, though this took several weeks to get approved  Vit  D level was low, which was being supplemented  DEXA scan returned normal  Prescribed diclofenac gel to apply to painful knees and shoulders as needed for both her RA and OA  Ordered bilateral hand and feet x-rays to obtain patient's latest baseline, which revealed an erosion at the 3rd DIP joint on the right hand, which is an atypical location for RA  Since patient had a history of miscarriage, ordered an antiphospholipid panel, which returned unremarkable       She presented for follow-up on 10/29/19, at which time she had not yet started on Humira, and her RA symptoms were overall stable  She was encouraged to go ahead and start Humira while starting to taper her prednisone by 1mg every 2 weeks  Also had started patient on the DMARD hydroxychloroquine 200mg po bid to work in conjunction with Humira to control her RA symptoms  She was to continue her ergocalciferol 50,000 units po weekly for her Vit  D deficiency until her Vit  D level was repeated  She was to continue to apply diclofenac gel as needed for her knee osteoarthritis-related pain       She then presented for follow-up on 12/10/19 as an urgent clinic visit after being managed at the [facility] ED for an extensive rash that she developed as an allergic reaction to Humira, which had significantly improved upon the medication's discontinuation and increased prednisone course  Patient's ANCA and MO-3 were slightly positive 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  Besides rash, which had resolved, patient had no signs or symptoms of a vasculitis, such as kidney or lung involvement  Had decided to hold off initiating any further DMARD or biologic therapy for patient's seronegative RA   Asked patient to increase her prednisone to 30mg po daily for a week, then decrease to 20mg po daily for a week, then 15mg po daily for a week, then 10mg po daily for a week, then go down by 1mg every two weeks as tolerated  She was to continue ergocalciferol 50,000 units po weekly for her Vit  D deficiency  Ordered repeat ANCA and the muscle enzymes CK and aldolase; muscle enzymes returned normal, and her atypical pANCA returned slightly positive at 1:80, though her regular C- and P-ANCA returned negative, as well as MPO and DE-3 returned negative, making it more likely that the patient 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  She next presented for follow-up on 5/18/20 via telemedicine for her seronegative Rheumatoid arthritis  She had a rash to methotrexate, sulfasalazine, and Humira so far  Was considering starting patient on leflunomide 10mg po daily for DMARD therapy, so that her prednisone could be tapered down in the future  Ordered baseline CBC, CMP prior to starting per patient's request, and ESR, CRP for disease activity monitoring  She was to continue prednisone 10mg po daily for time-being, and diclofenac gel as needed for joint pain  8/6/20: Her right shoulder has been bothering her as well as her right knee, in which she has OA  She can restart taking HCQ 200mg po bid, since she didn't actually have a reaction to it in the past, rather it was Humira that she developed a severe rash to  She can use diclofenac gel or ibuprofen for knee pain as needed (asked her to not use them at the same time)  She is to continue 2,000 units a day of Vit  D for low Vit  D  Also, she is to contact Orthopedics regarding possible knee replacement  6/10/21: With patient's recently active psoriasis, it is now apparent that she actually has psoriatic arthritis rather than seronegative RA, especially since her x-rays reveal inflammatory joint changes at her DIPs, which is much more common in psoriatic arthritis than RA  Patient would be a good candidate for Taltz to help both her extensive psoriasis as well as arthritis symptoms; it would be 160mg once followed by 80mg every 4 weeks   She had a severe rash allergic reaction to Humira in the past, so is hesitant to try any other TNF inhibitors at this time  Has had allergic reactions of rash to methotrexate sulfasalazine in the past  Restarted leflunomide 10mg po daily while awaiting Taltz approval; do not believe leflunomide alone can help resolved patient's extensive skin psoriasis  Can use clobetasol ointment or powder as needed for psoriasis  Take 2,000 units a day of Vit  D over the counter  Can take diclofenac and diclofenac gel as needed for joint pain  Orthopedics referral made for patient to re-establish with Dr Beth flynn; is s/p bilateral knee replacement at [facility], but patient wants to follow-up with [facility]    8/2/21: The honey crusted lesions on her knees seem consistent with impetigo; asked patient to finish doxycycline course twice a day  Recommend that she follow up with Dermatology as soon as possible  Also recommended that she finish 14 days ibuprofen 3 times a day for possible impetigo on her knees  Asked patient to hold the [address] until the coast and some areas on her her knees completely heal  If crusted areas don't heal, patient is to let me know and I can prescribe another antibiotic such as Bactrim  Continue leflunomide 10mg po daily  Provided wound care for her knees lesions  11/2/21: Patient was not having complete improvement/resolution of symptoms on Taltz, despite being on it for several months; it temporarily helped her rash but had not helped her joint pain  She wanted like to try Orencia weekly injections as an alternative  She had already tried and failed or had adverse effects to methotrexate, Humira, hydroxychloroquine, sulfasalazine, and now [address]  She also had been successfully tapered off prednisone, and did not wish to restart it  Increased her leflunomide dose to 20 mg p o  daily; however, patient needed the addition of a biologic for better control of her disease        HPI   Nettie Gayle is a 63 y o   female who presents for follow-up of her seronegative RA  Last clinic visit was 11/2/2021  Patient has a depressed mood as she lost her father recently and her sone has Lymes disease  She feels pain in her knees that bothers her when she walks  Her psoriatic rash on the face and ears is same as it was before  Patient also mentions blistering rash around he knees that now cannot be seen on exam  She is concerned that she is about to have a flair, however she agrees that there is no evidence of it right now  She denies swelling, warmth redness of joints, denies fever, chills  Patient had left thumb fracture around a [place] time  The following portions of the patient's history were reviewed and updated as appropriate: allergies, current medications, past family history, past medical history, past social history, past surgical history and problem list     Review of Systems:   Review of Systems   Constitutional: Negative  Negative for chills, fatigue and fever  HENT: Negative  Negative for congestion, hearing loss and mouth sores  Eyes: Negative  Negative for redness  Respiratory: Negative for cough, chest tightness, shortness of breath and wheezing  Cardiovascular: Negative for chest pain, palpitations and leg swelling  Gastrointestinal: Negative for abdominal pain, constipation, diarrhea and nausea  Endocrine: Negative  Genitourinary: Negative for dysuria and flank pain  Musculoskeletal: Positive for arthralgias, myalgias, neck pain and neck stiffness  Negative for joint swelling  Skin: Negative for pallor and rash  Allergic/Immunologic: Negative  Neurological: Negative for dizziness, numbness and headaches  Hematological: Negative  Psychiatric/Behavioral: The patient is not nervous/anxious          Home Medications:     Current Outpatient Medications:     albuterol (2 5 mg/3 mL) 0 083 % nebulizer solution, Take 1 vial (2 5 mg total) by nebulization every 6 (six) hours as needed for wheezing, Disp: 150 mL, Rfl: 3   albuterol (PROVENTIL HFA,VENTOLIN HFA) 90 mcg/act inhaler, Inhale 2 puffs 4 (four) times a day As directed, Disp: 3 Inhaler, Rfl: 1    budesonide-formoterol (SYMBICORT) 160-4 5 mcg/act inhaler, Inhale 2 puffs 2 (two) times a day, Disp: , Rfl:     buPROPion (WELLBUTRIN SR) 150 mg 12 hr tablet, 2 am, 1 pm (Patient taking differently: 2 (two) times a day ), Disp: 90 tablet, Rfl: 5    clobetasol (TEMOVATE) 0 05 % ointment, Apply topically 2 (two) times a day, Disp: 60 g, Rfl: 3    fluticasone (FLONASE) 50 mcg/act nasal spray, 1 spray into each nostril as needed  , Disp: , Rfl:     leflunomide (ARAVA) 20 MG tablet, Take 1 tablet (20 mg total) by mouth daily, Disp: 30 tablet, Rfl: 3    levalbuterol (XOPENEX HFA) 45 mcg/act inhaler, Inhale 1-2 puffs every 4 (four) hours as needed for wheezing, Disp: 1 Inhaler, Rfl: 5    montelukast (SINGULAIR) 10 mg tablet, TAKE 1 TABLET BY MOUTH DAILY AT BEDTIME, Disp: 90 tablet, Rfl: 0    mupirocin (BACTROBAN) 2 % ointment, Apply twice daily to open sores, Disp: 22 g, Rfl: 3    mupirocin (BACTROBAN) 2 % ointment, Apply topically 2 (two) times a day, Disp: 22 g, Rfl: 0    nystatin (MYCOSTATIN) powder, Apply topically 3 (three) times a day, Disp: 60 g, Rfl: 3    valsartan-hydrochlorothiazide (DIOVAN-HCT) 160-25 MG per tablet, Take 1 tablet by mouth daily, Disp: 90 tablet, Rfl: 1    Abatacept (Orencia ClickJect) 065 MG/ML SOAJ, Inject 125 mg SC weekly (Patient not taking: Reported on 2/10/2022 ), Disp: 4 mL, Rfl: 11    acetaminophen (TYLENOL) 650 mg CR tablet, Take 650 mg by mouth every 12 (twelve) hours (Patient not taking: Reported on 2/10/2022 ), Disp: , Rfl:     cycloSPORINE non-modified (SandIMMUNE) 100 mg capsule, Take 1 capsule (100 mg total) by mouth 2 (two) times a day for 2 days, THEN 1 capsule (100 mg total) daily for 10 days   Start if in psoriasis flare , Disp: 14 capsule, Rfl: 0    diclofenac sodium (VOLTAREN) 1 %, Apply 4 g topically 4 (four) times a day as needed (pain) (Patient not taking: Reported on 2/10/2022 ), Disp: 300 g, Rfl: 6    Diclofenac Sodium (VOLTAREN) 1 %, Apply 2 g topically 4 (four) times a day (Patient not taking: Reported on 2/10/2022 ), Disp: 100 g, Rfl: 6    diphenhydrAMINE (BENADRYL) 50 mg capsule, Take 50 mg by mouth every 6 (six) hours as needed, Disp: , Rfl:     doxycycline (ADOXA) 100 MG tablet, TAKE 1 TABLET BY MOUTH TWICE A DAY FOR 10 DAYS (Patient not taking: Reported on 11/2/2021), Disp: , Rfl:     hydrOXYzine HCL (ATARAX) 25 mg tablet, TAKE 1 TABLET (25 MG TOTAL) BY MOUTH EVERY 6 (SIX) HOURS AS NEEDED FOR ITCHING (Patient not taking: Reported on 7/28/2020), Disp: 120 tablet, Rfl: 0    meloxicam (Mobic) 15 mg tablet, Take 1 tablet (15 mg total) by mouth daily As needed for joint pain, Disp: 30 tablet, Rfl: 3    methocarbamol (ROBAXIN) 750 mg tablet, Take 750 mg by mouth every 6 (six) hours as needed (Patient not taking: Reported on 8/3/2021), Disp: , Rfl:     SPIRIVA RESPIMAT 1 25 MCG/ACT AERS inhaler, INHALE 2 INHALATIONS BY MOUTH DAILY (Patient not taking: Reported on 12/9/2019), Disp: 3 Inhaler, Rfl: 1    spironolactone (ALDACTONE) 25 mg tablet, Take 25 mg by mouth as needed (Patient not taking: Reported on 11/2/2021), Disp: , Rfl:     terbinafine (LAMISIL AT) 1 % cream, Apply topically to feet twice a day (Patient not taking: Reported on 6/10/2021), Disp: 60 g, Rfl: 3    traMADol (ULTRAM) 50 mg tablet, Take  mg by mouth every 6 (six) hours as needed (Patient not taking: Reported on 8/3/2021), Disp: , Rfl:     triamcinolone (KENALOG) 0 1 % ointment, APPLY TOPICALLY TWICE A DAY FOR 2 WEEKS (Patient not taking: Reported on 6/10/2021), Disp: 454 g, Rfl: 0    triamcinolone (KENALOG) 0 1 % ointment, Apply topically 2 (two) times a day (Patient not taking: Reported on 2/10/2022 ), Disp: 454 g, Rfl: 1    Objective:    Vitals:    02/10/22 1057   BP: 110/78   Pulse: 82   Temp: (!) 97 2 °F (36 2 °C)   Height: 5' 1" (1 549 m) Physical Exam  Vitals reviewed  Constitutional:       General: She is not in acute distress  Appearance: Normal appearance  She is obese  She is not ill-appearing or toxic-appearing  HENT:      Head: Normocephalic and atraumatic  Nose: Nose normal       Mouth/Throat:      Mouth: Mucous membranes are moist    Eyes:      General: No scleral icterus  Extraocular Movements: Extraocular movements intact  Conjunctiva/sclera: Conjunctivae normal    Cardiovascular:      Rate and Rhythm: Normal rate and regular rhythm  Pulses: Normal pulses  Heart sounds: Normal heart sounds  No murmur heard  No gallop  Pulmonary:      Effort: Pulmonary effort is normal  No respiratory distress  Breath sounds: Normal breath sounds  No wheezing, rhonchi or rales  Musculoskeletal:         General: Tenderness (tenderness to palpation in her knees) present  No swelling or deformity  Right lower leg: No edema  Left lower leg: No edema  Skin:     General: Skin is warm  Coloration: Skin is not jaundiced  Findings: No erythema or rash  Neurological:      General: No focal deficit present  Mental Status: She is alert and oriented to person, place, and time  Cranial Nerves: No cranial nerve deficit  Motor: No weakness  Psychiatric:         Mood and Affect: Mood normal          Behavior: Behavior normal        Reviewed labs and imaging  Imaging:  XR right knee 8/14/20  Moderate tricompartmental osteoarthritis as evidenced by joint space narrowing, osteophyte formation and subchondral sclerosis    CXR 9/19/19  No acute cardiopulmonary disease  DXA 9/16/19  RESULTS:   LUMBAR SPINE:  L1-L3,L4:  BMD 1 019 gm/cm2  T-score normal, -0 3  Z-score 0 7     LEFT TOTAL HIP:  BMD 1 076 gm/cm2  T-score above normal, +1 1  Z-score 1 8     LEFT FEMORAL NECK:  BMD 0 765 gm/cm2  T-score normal, -0 8  Z-score 0 3     IMPRESSION:  1   Based on the [facility] classification, this study is normal and the patient is considered at low risk for fracture    XR bilateral hand and feet 9/16/19  Left foot: Mild joint space narrowing of the 1st MTP joint is seen  Degenerative spurring of the midfoot is noted with accessory ossicle adjacent to the tarsal navicular  Calcaneal spurring is seen  No erosions are identified      Right foot: Degenerative changes of the 1st MTP joint are noted  There is widening of the 5th PIP joint without change which may be related to prior surgery  No erosions are identified  Mild spurring of the midfoot is demonstrated with a sensory   ossicle adjacent to the navicular  The appearance is similar to 2016      Right hand: The intercarpal joints are unremarkable as are the MCP and PIP joints  Periarticular calcification is adjacent to the 3rd DIP joint with equivocal erosion noted      Left hand: The intercarpal joint sensory minimal spurring at the 1st metacarpal carpal joint  The MCP and PIP joints are unremarkable  The DIP joints demonstrate minimal degenerative spurring involving the 2nd DIP  No erosions can be identified  Outside Bilateral Hand x-rays 11/29/19  Small areas suggestive of erosions, most notable right hand lunate,  along the scapholunate joint  Mild osteoarthritis of the IP joints  Right third finger DIP joint differential includes gouty arthropathy as well as arthritis      Outside Bilateral Feet x-rays 11/29/19  IMPRESSION:  Impression: Degenerative changes are demonstrated at the right and at the left foot   No erosive arthropathy is seen      Labs:   Outside Labs from [facility] 1/27-28/19: anti-dsDNA negative, anti-SSA/SSB negative, anti-Smith negative, anti-RNP negative, anti-Scl70 Ab negative, ANCA slightly positive at 1:80, [place]-3 positive at 24, normal C3, C4 elevated at 40 7, CRP <3, cryoglobulin negative    Office Visit on 02/10/2022   Component Date Value Ref Range Status    Vit D, 25-Hydroxy 02/10/2022 18 2* 30 0 - 100 0 ng/mL Final

## 2022-02-11 LAB — B BURGDOR IGG+IGM SER-ACNC: 23

## 2022-03-01 ENCOUNTER — TELEPHONE (OUTPATIENT)
Dept: OBGYN CLINIC | Facility: HOSPITAL | Age: 57
End: 2022-03-01

## 2022-03-01 ENCOUNTER — TELEPHONE (OUTPATIENT)
Dept: DERMATOLOGY | Facility: CLINIC | Age: 57
End: 2022-03-01

## 2022-03-01 NOTE — TELEPHONE ENCOUNTER
Pt left vm asking for a cb as she has a rash again, I cb but she didn't answer I advised to cb when she can

## 2022-03-01 NOTE — TELEPHONE ENCOUNTER
Dr Bibiana Ga    [phone]    Patient states that she received a call for an earlier date for her surgery  Please advise

## 2022-03-16 ENCOUNTER — TELEPHONE (OUTPATIENT)
Dept: OBGYN CLINIC | Facility: HOSPITAL | Age: 57
End: 2022-03-16

## 2022-03-16 NOTE — TELEPHONE ENCOUNTER
[facility] is calling to cancel surgery tomorrow  Her son is in the hospital      Patient will call back to reschedule when available

## 2022-03-16 NOTE — TELEPHONE ENCOUNTER
Please do not cancel the orders   Can you just remove the surgery date since she is planning on rescheduling

## 2022-04-30 ENCOUNTER — TELEPHONE (OUTPATIENT)
Dept: DERMATOLOGY | Facility: CLINIC | Age: 57
End: 2022-04-30

## 2022-08-09 ENCOUNTER — OFFICE VISIT (OUTPATIENT)
Dept: DERMATOLOGY | Facility: CLINIC | Age: 57
End: 2022-08-09
Payer: COMMERCIAL

## 2022-08-09 VITALS — BODY MASS INDEX: 41.16 KG/M2 | HEIGHT: 61 IN | WEIGHT: 218 LBS | TEMPERATURE: 98.6 F

## 2022-08-09 DIAGNOSIS — L40.8 SEBOPSORIASIS: Primary | ICD-10-CM

## 2022-08-09 DIAGNOSIS — L40.9 PSORIASIS: ICD-10-CM

## 2022-08-09 PROCEDURE — 99214 OFFICE O/P EST MOD 30 MIN: CPT | Performed by: DERMATOLOGY

## 2022-08-09 RX ORDER — KETOCONAZOLE 20 MG/ML
SHAMPOO TOPICAL
Qty: 120 ML | Refills: 3 | Status: SHIPPED | OUTPATIENT
Start: 2022-08-09

## 2022-08-09 RX ORDER — HYDROCHLOROTHIAZIDE 25 MG/1
TABLET ORAL
COMMUNITY
Start: 2022-05-27

## 2022-08-09 RX ORDER — CLOBETASOL PROPIONATE 0.5 MG/G
OINTMENT TOPICAL 2 TIMES DAILY
Qty: 60 G | Refills: 0 | Status: SHIPPED | OUTPATIENT
Start: 2022-08-09

## 2022-08-09 RX ORDER — FLUOCINONIDE TOPICAL SOLUTION USP, 0.05% 0.5 MG/ML
SOLUTION TOPICAL 2 TIMES DAILY
Qty: 60 ML | Refills: 2 | Status: SHIPPED | OUTPATIENT
Start: 2022-08-09

## 2022-08-09 NOTE — PATIENT INSTRUCTIONS
FOLLOW UP: PSORIASIS   Psoriatic arthritis     Assessment and Plan:  Based on a thorough discussion of this condition and the management approach to it (including a comprehensive discussion of the known risks, side effects and potential benefits of treatment), the patient (family) agrees to implement the following specific plan:  Discussed obtaining prior authorization for Skyrizi, which is approved for both skin and psoriatic arthritis (150 mg SQ at week 0,4 and then every 12 weeks)  Follow up with rheumatologist and discussed medication change to [facility] and to discontinue Abatacept  Follow up in one month    SEBOPSORIASIS    Assessment and Plan:  Based on a thorough discussion of this condition and the management approach to it (including a comprehensive discussion of the known risks, side effects and potential benefits of treatment), the patient (family) agrees to implement the following specific plan:  Start Lidex solution twice daily to scalp for maximum 14 days at a time  Take 1 week break, then resume for flares  This may be used in the ears as well  Avoid lidex and clobetasol on face, groin, axilla  Start ketoconazole shampoo in shower as needed for scalp; wait 2-5 minutes before rinsing off  Start clobetasol ointment to affected areas on body twice daily for 14 days at a time  Take one week break, then resume for flares for 14 days at a time       SEBOPSORIASIS  is an overlap between seborrhea and psoriasis  Seborrhea is probably a reaction to a yeast that lives on the skin and psoriasis in an autoimmune condition where the immune system is sending signals to the skin to grow very rapidly and not to form properly  Sebopsoriasis affects primarily the scalp and the face  It causes a lot of redness and scaling and can be itchy  TREATMENT is aimed at reducing the yeast and treating the inflammation and scale with:   1  Antifungal agents - like antifungal shampoos, creams or washes   2   Topical steroids - solutions, creams, foams or shampoos   3  Keratolytics - creams or shampoos that help the skin to shed better   They contain salicylic acid, lactic acid or urea  Sometimes more aggressive treatment is needed and these will be discussed if there is a lack of response to traditional treatment

## 2022-08-09 NOTE — PROGRESS NOTES
[facility] Follow Up Note    Patient Name: Mandy Gustafson  Encounter Date: 8/9/2022    Today's Chief Concerns:  [facility] Concern #1:  Flare up of psoriasis     Current Medications:    Current Outpatient Medications:     Abatacept (Orencia ClickJect) 839 MG/ML SOAJ, Inject 125 mg SC weekly, Disp: 4 mL, Rfl: 11    acetaminophen (TYLENOL) 650 mg CR tablet, Take 650 mg by mouth every 8 (eight) hours as needed  , Disp: , Rfl:     albuterol (2 5 mg/3 mL) 0 083 % nebulizer solution, Take 1 vial (2 5 mg total) by nebulization every 6 (six) hours as needed for wheezing, Disp: 150 mL, Rfl: 3    albuterol (PROVENTIL HFA,VENTOLIN HFA) 90 mcg/act inhaler, Inhale 2 puffs 4 (four) times a day As directed, Disp: 3 Inhaler, Rfl: 1    budesonide-formoterol (SYMBICORT) 160-4 5 mcg/act inhaler, Inhale 2 puffs 2 (two) times a day, Disp: , Rfl:     buPROPion (WELLBUTRIN SR) 150 mg 12 hr tablet, 2 am, 1 pm (Patient taking differently: 2 (two) times a day), Disp: 90 tablet, Rfl: 5    clobetasol (TEMOVATE) 0 05 % ointment, Apply topically 2 (two) times a day (Patient taking differently: Apply topically 2 (two) times a day as needed), Disp: 60 g, Rfl: 3    diclofenac sodium (VOLTAREN) 1 %, Apply 4 g topically 4 (four) times a day as needed (pain), Disp: 300 g, Rfl: 6    Diclofenac Sodium (VOLTAREN) 1 %, Apply 2 g topically 4 (four) times a day, Disp: 100 g, Rfl: 6    fluticasone (FLONASE) 50 mcg/act nasal spray, 1 spray into each nostril as needed  , Disp: , Rfl:     hydrochlorothiazide (HYDRODIURIL) 25 mg tablet, TAKE 1 TABLET (25 MG TOTAL) BY MOUTH DAILY  , Disp: , Rfl:     levalbuterol (XOPENEX HFA) 45 mcg/act inhaler, Inhale 1-2 puffs every 4 (four) hours as needed for wheezing, Disp: 1 Inhaler, Rfl: 5    montelukast (SINGULAIR) 10 mg tablet, TAKE 1 TABLET BY MOUTH DAILY AT BEDTIME, Disp: 90 tablet, Rfl: 0    nystatin (MYCOSTATIN) powder, Apply topically 3 (three) times a day (Patient taking differently: Apply topically 3 (three) times a day as needed), Disp: 60 g, Rfl: 3    hydrOXYzine HCL (ATARAX) 25 mg tablet, TAKE 1 TABLET (25 MG TOTAL) BY MOUTH EVERY 6 (SIX) HOURS AS NEEDED FOR ITCHING (Patient not taking: Reported on 8/9/2022), Disp: 120 tablet, Rfl: 0    leflunomide (ARAVA) 10 MG tablet, TAKE 1 TABLET BY MOUTH EVERY DAY (Patient not taking: Reported on 8/9/2022), Disp: 30 tablet, Rfl: 3    leflunomide (ARAVA) 20 MG tablet, Take 1 tablet (20 mg total) by mouth daily (Patient not taking: Reported on 3/14/2022 ), Disp: 30 tablet, Rfl: 3    mupirocin (BACTROBAN) 2 % ointment, Apply twice daily to open sores (Patient not taking: Reported on 8/9/2022), Disp: 22 g, Rfl: 3    mupirocin (BACTROBAN) 2 % ointment, Apply topically 2 (two) times a day (Patient not taking: Reported on 8/9/2022), Disp: 22 g, Rfl: 0    triamcinolone (KENALOG) 0 1 % ointment, Apply topically 2 (two) times a day (Patient not taking: Reported on 8/9/2022), Disp: 454 g, Rfl: 1    valsartan-hydrochlorothiazide (DIOVAN-HCT) 160-25 MG per tablet, Take 1 tablet by mouth daily (Patient not taking: Reported on 8/9/2022), Disp: 90 tablet, Rfl: 1    Allergies   Allergen Reactions    Other Rash     Adhesive tape and adhesive bandaids    Sulfasalazine Rash    Taltz [Ixekizumab] Itching, Swelling and Rash    Wound Dressing Adhesive Itching and Rash    Adalimumab Itching and Rash       CONSTITUTIONAL:   Vitals:    08/09/22 1532   Temp: 98 6 °F (37 °C)   TempSrc: Temporal   Weight: 98 9 kg (218 lb)   Height: 5' 1" (1 549 m)       Specific Alerts:    Have you been seen by a [facility] Dermatologist in the last 3 years? YES    Are you pregnant or planning to become pregnant? No    Are you currently or planning to be nursing or breast feeding?  No    Allergies   Allergen Reactions    Other Rash     Adhesive tape and adhesive bandaids    Sulfasalazine Rash    Taltz [Ixekizumab] Itching, Swelling and Rash    Wound Dressing Adhesive Itching and Rash    Adalimumab Itching and Rash       May we call your Preferred Phone number to discuss your specific medical information? YES    May we leave a detailed message that includes your specific medical information? YES    Have you traveled outside of the [facility] in the past 3 months? No    Do you currently have a pacemaker or defibrillator? No    Do you have any artificial heart valves, joints, plates, screws, rods, stents, pins, etc? YES   - If Yes, were any placed within the last 2 years? Knee replacement 7/21    Do you require any medications prior to a surgical procedure? YES   - If Yes, for which procedure? Dental    - If Yes, what medications to you require? Antibiotic     Are you taking any medications that cause you to bleed more easily ("blood thinners") No    Have you ever experienced a rapid heartbeat with epinephrine? No    Have you ever been treated with "gold" (gold sodium thiomalate) therapy? [facility] can help with wrinkles, "laugh lines," facial volume loss, "double chin," "love handles," age spots, and more  Are you interested in learning today about some of the skin enhancement procedures that we offer? (If Yes, please provide more detail) {    Review of Systems:  Have you recently had or currently have any of the following?     · Fever or chills: No  · Night Sweats: No  · Headaches: No  · Weight Gain: No  · Weight Loss: No  · Blurry Vision: No  · Nausea: No  · Vomiting: No  · Diarrhea: No  · Blood in Stool: No  · Abdominal Pain: No  · Itchy Skin: YES  · Painful Joints: No  · Swollen Joints: No  · Muscle Pain: No  · Irregular Mole: No  · Sun Burn: No  · Dry Skin: No  · Skin Color Changes: No  · Scar or Keloid: No  · Cold Sores/Fever Blisters: No  · Bacterial Infections/MRSA: No  · Anxiety: YES  · Depression: YES  · Suicidal or Homicidal Thoughts: No    PSYCH: Normal mood and affect  EYES: Normal conjunctiva  ENT: Normal lips and oral mucosa  CARDIOVASCULAR: No edema  RESPIRATORY: Normal respirations  HEME/LYMPH/IMMUNO:  No regional lymphadenopathy except as noted below in ASSESSMENT AND PLAN BY DIAGNOSIS        FOLLOW UP: PSORIASIS   Psoriatic arthritis     Physical Exam:     Pertinent Positives: knees pain, joint pain in hands   Pertinent Negatives: Additional History of Present Condition:     History of Arthritis: YES   Any recent infections: No   History of malignancy: No   Previous Treatment: Orencia    20% skin affected   Did you experience any side effects of treatment: no improvement   Are you happy with the improvement: no      Assessment and Plan:  Based on a thorough discussion of this condition and the management approach to it (including a comprehensive discussion of the known risks, side effects and potential benefits of treatment), the patient (family) agrees to implement the following specific plan:   Discussed obtaining prior authorization for [employer], which is approved for both skin and psoriatic arthritis (150 mg SQ at week 0,4 and then every 12 weeks)   Follow up with rheumatologist and discussed medication change to [employer] and to discontinue Abatacept   Follow up in one month                SEBOPSORIASIS    Physical Exam:   Anatomic Location Affected:  Scalp, trunk, extremities   Morphological Description:  Scaly pink plaques   Severity: severe   Pertinent Positives:   Pertinent Negatives: Additional History of Present Condition:  Present for few weeks    Assessment and Plan:  Based on a thorough discussion of this condition and the management approach to it (including a comprehensive discussion of the known risks, side effects and potential benefits of treatment), the patient (family) agrees to implement the following specific plan:   Start Lidex solution twice daily to scalp for maximum 14 days at a time  Take 1 week break, then resume for flares  This may be used in the ears as well   Avoid lidex and clobetasol on face, groin, axilla   Start ketoconazole shampoo in shower as needed for scalp; wait 2-5 minutes before rinsing off   Start clobetasol ointment to affected areas on body twice daily for 14 days at a time  Take one week break, then resume for flares for 14 days at a time       SEBOPSORIASIS  is an overlap between seborrhea and psoriasis  Seborrhea is probably a reaction to a yeast that lives on the skin and psoriasis in an autoimmune condition where the immune system is sending signals to the skin to grow very rapidly and not to form properly  Sebopsoriasis affects primarily the scalp and the face  It causes a lot of redness and scaling and can be itchy  TREATMENT is aimed at reducing the yeast and treating the inflammation and scale with:   1  Antifungal agents - like antifungal shampoos, creams or washes   2  Topical steroids - solutions, creams, foams or shampoos   3  Keratolytics - creams or shampoos that help the skin to shed better   They contain salicylic acid, lactic acid or urea  Sometimes more aggressive treatment is needed and these will be discussed if there is a lack of response to traditional treatment          Jared Law MD  Scribe Attestation    I,:  Vneus Dunaway am acting as a scribe while in the presence of the attending physician :       I,:  Judd Hampton MD personally performed the services described in this documentation    as scribed in my presence :

## 2022-08-16 ENCOUNTER — TELEPHONE (OUTPATIENT)
Dept: DERMATOLOGY | Facility: CLINIC | Age: 57
End: 2022-08-16

## 2022-08-16 DIAGNOSIS — L40.9 PSORIASIS: Primary | ICD-10-CM

## 2022-08-16 NOTE — TELEPHONE ENCOUNTER
Received call from pt to let us know that she spoke to her Rheumatologist about starting Skyrizi and discontinuing Abatacept  She was told she can stop at anytime  Pt wants to know about how long it will take to get Skyrizi  Pt would like a call back with an update on Skyrizi

## 2022-08-19 NOTE — TELEPHONE ENCOUNTER
Pt called again wanting to know an update on Skyrizi  I explained to pt that this medication needed a prior auth and it could take up to 30 days for a prior auth to go through  Pt states she is having a flare up      I don't see that a prior [place] was started for skyrizi

## 2022-08-23 NOTE — TELEPHONE ENCOUNTER
Called patient and made her aware she needed to completed a TB test to move forward with Prior authorization since her last cone was completed in June of 2021  I did call patient and made her aware the blood work needed to be completed and order will be put in by the end of the day  She confirmed understanding

## 2022-08-30 ENCOUNTER — APPOINTMENT (OUTPATIENT)
Dept: LAB | Facility: MEDICAL CENTER | Age: 57
End: 2022-08-30
Payer: COMMERCIAL

## 2022-08-30 DIAGNOSIS — L40.9 PSORIASIS: ICD-10-CM

## 2022-08-30 PROCEDURE — 86480 TB TEST CELL IMMUN MEASURE: CPT

## 2022-08-30 PROCEDURE — 36415 COLL VENOUS BLD VENIPUNCTURE: CPT

## 2022-09-01 LAB
GAMMA INTERFERON BACKGROUND BLD IA-ACNC: 0.03 IU/ML
M TB IFN-G BLD-IMP: NEGATIVE
M TB IFN-G CD4+ BCKGRND COR BLD-ACNC: -0.01 IU/ML
M TB IFN-G CD4+ BCKGRND COR BLD-ACNC: 0 IU/ML
MITOGEN IGNF BCKGRD COR BLD-ACNC: 7.45 IU/ML

## 2022-09-02 NOTE — TELEPHONE ENCOUNTER
Please forward any questions about KAL lopez to Heather or the [facility] working on it

## 2022-09-07 NOTE — TELEPHONE ENCOUNTER
Pt called in and wants an update about her PA for [employer]  Pt states she's very itchy and it's driving her crazy  Pt would like a c/b with an update  Pt gave a second number in case the 1st one didn't work   [phone] Thank you

## 2022-09-08 NOTE — TELEPHONE ENCOUNTER
Pt calling on status of KAL Chau, we were waiting on her TB results  We just got them so I sent them to [email or web address] R/x im waiting for their response we just got the results a week ago   I advised Pt of this and she stated she thinks she should have been told at the Dr Amberly Barakat to go for the bloodwork and then it wouldn't be taking this long, she was upset

## 2022-09-09 NOTE — TELEPHONE ENCOUNTER
Prior authorization form was filled out and faxed to patients insurance  Marked as urgent  Form was scannd into patients chart

## 2022-09-12 ENCOUNTER — NURSE TRIAGE (OUTPATIENT)
Dept: OTHER | Facility: OTHER | Age: 57
End: 2022-09-12

## 2022-09-12 DIAGNOSIS — L40.9 PSORIASIS: ICD-10-CM

## 2022-09-12 RX ORDER — RISANKIZUMAB-RZAA 150 MG/ML
INJECTION SUBCUTANEOUS
Qty: 1 ML | Refills: 0 | Status: SHIPPED | OUTPATIENT
Start: 2022-09-12 | End: 2022-09-13

## 2022-09-12 RX ORDER — RISANKIZUMAB-RZAA 150 MG/ML
INJECTION SUBCUTANEOUS
Qty: 1 ML | Refills: 4 | Status: SHIPPED | OUTPATIENT
Start: 2022-09-12 | End: 2022-09-12 | Stop reason: SDUPTHER

## 2022-09-12 NOTE — TELEPHONE ENCOUNTER
Patient called very upset because she is extremely flared due to her Psoriasis, She states her Sandralee Staggers has been approved but prescription has not been sent to [facility]  I have added the script for you to enter instructions and sign off  Patient states she is extremely flared and last prescribed regimen is no longer helping her       - [address] [facility]; [address] [place] [address]

## 2022-09-12 NOTE — TELEPHONE ENCOUNTER
Just a clarification on previous note:    Pt states she called her insurance and they informed her Aguilar Bañuelos was pre-approved and pt is requesting that if we CALL the prescription in they would provide the medication to her within 24 hrs  VM:  Hi yes, this is Ashwin Underwood calling  I had called my insurance and they said that it was my demar Carly Fragoso was pre approved and if you guys were to call there and give give the scripts, tell them the script so I could get that within 24 hours, I would greatly appreciate it  My phone number is [phone] again Ashwin Underwood  I had called my insurance, evidently it was pre approved last Friday and they're waiting for you guys  Call them  It will go quicker if you call them instead of faxing that I could please get this stevie shabanaoli within 24 to 48 hours  Thank you very much  I appreciate it  Bye bye

## 2022-09-12 NOTE — TELEPHONE ENCOUNTER
Please remind patient that [address] together is a potentially risky combination involving an increased risk of very serious infections and other problems  She should continue to see her rheumatologist and PCP

## 2022-09-12 NOTE — TELEPHONE ENCOUNTER
Regarding: Risankizumab-rzaa (Skyrizi Pen) 150 MG/ML - needs to be sent again   ----- Message from Marcella Garner sent at 9/12/2022  6:16 PM EDT -----  Patient called in and said that the pharmacy did not get the script that she needs, it is for Risankizumab-rzaa (Skyrizi Pen) 150 MG/ML  Please see refill/ongoing message  It was sent in at 5:49 today but they just need it faxed back over

## 2022-09-12 NOTE — TELEPHONE ENCOUNTER
Reason for Disposition   [1] Prescription prescribed recently is not at pharmacy AND [2] triager has access to patient's EMR AND [3] prescription is recorded in the EMR    Answer Assessment - Initial Assessment Questions  1  DRUG NAME: "What medicine do you need to have refilled?"      Maddison Serrano  2  REFILLS REMAINING: "How many refills are remaining?" (Note: The label on the medicine or pill bottle will show how many refills are remaining   If there are no refills remaining, then a renewal may be needed )      New script    Protocols used: MEDICATION REFILL AND RENEWAL CALL-ADULT-

## 2022-09-12 NOTE — TELEPHONE ENCOUNTER
Pt called and said the [facility] was approved and that the doctor should viri;l [facility] so the pt can get it sooner  Pt would like someone to please call her back with an update   Thank you

## 2022-09-13 ENCOUNTER — TELEPHONE (OUTPATIENT)
Dept: DERMATOLOGY | Facility: CLINIC | Age: 57
End: 2022-09-13

## 2022-09-13 RX ORDER — RISANKIZUMAB-RZAA 150 MG/ML
INJECTION SUBCUTANEOUS
Qty: 1 ML | Refills: 4 | Status: SHIPPED | OUTPATIENT
Start: 2022-09-13

## 2022-09-13 RX ORDER — RISANKIZUMAB-RZAA 150 MG/ML
INJECTION SUBCUTANEOUS
Qty: 2 ML | Refills: 0 | Status: SHIPPED | OUTPATIENT
Start: 2022-09-13

## 2022-09-13 NOTE — TELEPHONE ENCOUNTER
Spoke with patient in regards to her Skyrizi medication  Pt called me with [facility] on the line  They stated they did not have the loading dose  Spoke with staff and Dr Mary Alice Nickerson and the one script was replaced with two- 1 for loading dose and 1 for maintenance dose  Informed patient that these were both sent in this morning and Prior Auths were completed for both and per Epic the pharmacy received both  Pt was frustrated with the process but thankful for the assistance given to her

## 2022-09-13 NOTE — TELEPHONE ENCOUNTER
Approval letteres were scanned into patients chart  I called patient but was unable to reach her at this time  I did make her aware of approval and that med request was sent to provider and she should be hearing from [facility] in regards to delivery  Once she has delivery date scheduled she should call office to schedule nurse visit so we show her how to properly administer medication as well as schedule 6 month follow up

## 2022-09-20 ENCOUNTER — TELEPHONE (OUTPATIENT)
Dept: DERMATOLOGY | Facility: CLINIC | Age: 57
End: 2022-09-20

## 2022-09-20 NOTE — TELEPHONE ENCOUNTER
Voice mail message received 9/20/22 at 8:32 am from Andi mott [place] [address] regarding [facility], THE prescription  Called and spoke to Hillary Rico and she informed me prescriptions were received and they also had spoke to patient  Pharmacy has received prescriptions and patient receiving medication was verified today and  confirmed by Hillary Rico

## 2022-09-26 ENCOUNTER — OFFICE VISIT (OUTPATIENT)
Dept: SLEEP CENTER | Facility: CLINIC | Age: 57
End: 2022-09-26
Payer: COMMERCIAL

## 2022-09-26 VITALS
SYSTOLIC BLOOD PRESSURE: 120 MMHG | HEIGHT: 61 IN | DIASTOLIC BLOOD PRESSURE: 82 MMHG | BODY MASS INDEX: 39.04 KG/M2 | WEIGHT: 206.8 LBS

## 2022-09-26 DIAGNOSIS — G47.33 OBSTRUCTIVE SLEEP APNEA (ADULT) (PEDIATRIC): Primary | ICD-10-CM

## 2022-09-26 DIAGNOSIS — I10 PRIMARY HYPERTENSION: ICD-10-CM

## 2022-09-26 DIAGNOSIS — J45.30 MILD PERSISTENT ASTHMA WITHOUT COMPLICATION: ICD-10-CM

## 2022-09-26 DIAGNOSIS — Z87.891 FORMER SMOKER: ICD-10-CM

## 2022-09-26 DIAGNOSIS — E66.01 MORBID OBESITY WITH BMI OF 40.0-44.9, ADULT (HCC): ICD-10-CM

## 2022-09-26 DIAGNOSIS — D68.9 COAGULOPATHY (HCC): ICD-10-CM

## 2022-09-26 DIAGNOSIS — G47.09 OTHER INSOMNIA: ICD-10-CM

## 2022-09-26 DIAGNOSIS — F41.9 ANXIETY AND DEPRESSION: ICD-10-CM

## 2022-09-26 DIAGNOSIS — R68.2 DRY MOUTH: ICD-10-CM

## 2022-09-26 DIAGNOSIS — G89.4 CHRONIC PAIN DISORDER: ICD-10-CM

## 2022-09-26 DIAGNOSIS — M06.00 RHEUMATOID ARTHRITIS WITH NEGATIVE RHEUMATOID FACTOR, INVOLVING UNSPECIFIED SITE (HCC): ICD-10-CM

## 2022-09-26 DIAGNOSIS — F32.A ANXIETY AND DEPRESSION: ICD-10-CM

## 2022-09-26 PROCEDURE — 99214 OFFICE O/P EST MOD 30 MIN: CPT | Performed by: INTERNAL MEDICINE

## 2022-09-26 RX ORDER — NITROFURANTOIN 25; 75 MG/1; MG/1
CAPSULE ORAL
COMMUNITY
Start: 2022-07-21

## 2022-09-26 NOTE — PATIENT INSTRUCTIONS

## 2022-09-26 NOTE — PROGRESS NOTES
Follow-Up Note - Sleep Center   Marbella Kelley  61 y o  female  :1965  ZGJ:[id number]  YCM:[id number]    CC: I saw this patient for follow-up in clinic today for Sleep disordered breathing, Coexisting Sleep and Medical Problems  She has a dream Station 1 machine that she got in 2019 has registered with Abbey Nirmal  Was last seen in 2021  She stopped using the machine in 2021 upon hearing about the recall  Results of a split study in 2019:  The diagnostic portion demonstrated  : AHI of 25 per hour, considerably higher while supine and during stage REM  Intermittent snoring was noted  Minimum oxygen saturation was 87 % and she spent 8 2 minutes of this portion of the study with saturations less than 90%  During the therapeutic portion of the study, his sleep disordered breathing was adequately remediated with nasal CPAP at 6 cm H2O  Sleep efficiency improved from 41-84% of the initiating therapy  PFSH, Problem List, Medications & Allergies were reviewed in EMR  Interval changes: none reported  She  has a past medical history of Abdominal hernia, Anxiety, Arthritis, Asthma, Brain injury ([address] ) (), COPD (chronic obstructive pulmonary disease) ([address] ), CPAP (continuous positive airway pressure) dependence, Depression, Environmental allergies, Fatty liver, GERD (gastroesophageal reflux disease), History of gestational diabetes, History of laparoscopic adjustable gastric banding, History of recent fall, Hypertension, Knee pain, right, Obese, Pneumonia, RA (rheumatoid arthritis) ([address] ), Risk for falls, Shortness of breath, Shoulder pain, bilateral, Sleep apnea, and Vomiting      She has a current medication list which includes the following prescription(s): albuterol, albuterol, budesonide-formoterol, bupropion, clobetasol, clobetasol, diclofenac sodium, diclofenac sodium, fluocinonide, hydrochlorothiazide, ketoconazole, levalbuterol, montelukast, nitrofurantoin, skyrizi pen, Deidre Sepulveda clickject, acetaminophen, fluticasone, hydroxyzine hcl, leflunomide, leflunomide, mupirocin, mupirocin, nystatin, skyrizi pen, and valsartan-hydrochlorothiazide  PHYSIOLOGICAL DATA REVIEW AND INTERPRETATION:  For 30 days ending 07/25/2021  using PAP > 4 hours/night 77%  Estimated DWIGHT 6 1/hour with pressure of 6cm H2O @90th/95th percentile; Patient has not been using ozone based devices to sanitize the machine  SUBJECTIVE: Regarding use of PAP, Elham reports:   · some adverse effects: dry mouth/throat because of mouth breathing; has not noticed any fibres or foreign material in air line  · She is benefiting from use: sleeping better   Sleep Routine: Ihor Oppenheim reports getting 3 hrs sleep  ; she has no difficulty initiating, but reports diffculty maintaining sleep  and attributes to I miss my machine"  She arises spontaneously and never feels rested  Ihor Oppenheim reports constant fatigue, but no Excessive Daytime Sleepiness, feels like napping but avoids  She rated herself at Total score: 1 /24 on the Sharples Sleepiness Scale  Habits: reports that she quit smoking about 21 months ago  Her smoking use included cigarettes  She has a 0 45 pack-year smoking history  She has never used smokeless tobacco ,  reports previous alcohol use ,  reports no history of drug use , Caffeine use:limited ; Exercise routine: "not enough"   ROS: reviewed & as attached  Significant for some weight reduction  She reported no nasal, respiratory or cardiac symptoms  She has difficulty with memory, feelings of anxiety and depression for which she is on medication  EXAM: /82   Ht 5' 1" (1 549 m)   Wt 93 8 kg (206 lb 12 8 oz)   BMI 39 07 kg/m²     Wt Readings from Last 3 Encounters:   09/26/22 93 8 kg (206 lb 12 8 oz)   08/09/22 98 9 kg (218 lb)   12/08/21 95 1 kg (209 lb 9 6 oz)      Patient is well groomed; well appearing  CNS: Alert, orientated, clear & coherent speech  Psych: cooperativeand in no distress   Mental state:appears normal   H&N: EOMI; NC/AT:no facial pressure marks, no rashes  Skin/Extrem: col & hydration normal; no edema  Resp: Respiratory effort is normal  Physical findings otherwise essentially unchanged from previous  IMPRESSION: Problem List Items & Comorbidities Addressed this Visit    1  Obstructive sleep apnea (adult) (pediatric)  Ambulatory Referral to Sleep Medicine    PAP DME Resupply/Reorder    PAP DME Pressure Change   2  Other insomnia  Ambulatory Referral to Sleep Medicine   3  Chronic pain disorder     4  Anxiety and depression     5  Dry mouth     6  Primary hypertension     7  Mild persistent asthma without complication     8  Former smoker     5  Rheumatoid arthritis with negative rheumatoid factor, involving unspecified site ([facility] )     10  Coagulopathy ([place] [address] )     11  Morbid obesity with BMI of 40 0-44 9, adult ([place] [address] )         PLAN:  I reviewed results of prior studies and physiologic data with the patient  Notified of [employer] devices recall and their recommendation to discontinue use  Risks of discontinuing PAP vs continuing while awaiting resolution were explained and patient indicates understanding  I discussed treatment options with risks and benefits  Patient elected to continue using Pap while awaiting remediation  Instructed  to register machine with [employer] & track progress on Yolanda Muna  Care of equipment, methods to improve comfort using PAP and importance of compliance with therapy were discussed  Instructed not to use ozone based or other unapproved  cleaning devices  Pressure setting:  Change 6-9 cmH2O  Rx provided to replace  supplies and Care coordinated with DME provider  Multi component Cognitive behavioral therapy for Insomnia undertaken - Sleep Restriction, Stimulus control, Relaxation techniques and Sleep hygiene were discussed  Discussed strategies for weight reduction      Follow-up is advised in 1 year or sooner if needed to monitor progress, compliance and to adjust therapy  Thank you for allowing me to participate in the care of this patient  Sincerely,     Authenticated electronically on 93/53/97   Board Certified Specialist     Portions of the record may have been created with voice recognition software  Occasional wrong word or "sound a like" substitutions may have occurred due to the inherent limitations of voice recognition software  There may also be notations and random deletions of words or characters from malfunctioning software  Read the chart carefully and recognize, using context, where substitutions/deletions have occurred

## 2022-09-26 NOTE — PROGRESS NOTES
Review of Systems      Genitourinary need to urinate more than twice a night   Cardiology ankle/leg swelling   Gastrointestinal none   Neurology need to move extremities, numbness/tingling of an extremity, forgetfulness, poor concentration or confusion,  and difficulty with memory   Constitutional fatigue   Integumentary rash or dry skin and itching   Psychiatry anxiety and depression   Musculoskeletal joint pain   Pulmonary snoring   ENT none   Endocrine frequent urination   Hematological none

## 2022-09-27 ENCOUNTER — TELEPHONE (OUTPATIENT)
Dept: SLEEP CENTER | Facility: CLINIC | Age: 57
End: 2022-09-27

## 2022-09-28 LAB
DME PARACHUTE DELIVERY DATE ACTUAL: NORMAL
DME PARACHUTE DELIVERY DATE REQUESTED: NORMAL
DME PARACHUTE ITEM DESCRIPTION: NORMAL
DME PARACHUTE ORDER STATUS: NORMAL
DME PARACHUTE SUPPLIER NAME: NORMAL
DME PARACHUTE SUPPLIER PHONE: NORMAL

## 2022-09-29 ENCOUNTER — TELEPHONE (OUTPATIENT)
Dept: OBGYN CLINIC | Facility: HOSPITAL | Age: 57
End: 2022-09-29

## 2022-09-29 ENCOUNTER — OFFICE VISIT (OUTPATIENT)
Dept: OBGYN CLINIC | Facility: CLINIC | Age: 57
End: 2022-09-29
Payer: COMMERCIAL

## 2022-09-29 VITALS
HEIGHT: 61 IN | DIASTOLIC BLOOD PRESSURE: 80 MMHG | WEIGHT: 206 LBS | SYSTOLIC BLOOD PRESSURE: 122 MMHG | BODY MASS INDEX: 38.89 KG/M2

## 2022-09-29 DIAGNOSIS — M67.441 GANGLION, RIGHT HAND: Primary | ICD-10-CM

## 2022-09-29 PROCEDURE — 99214 OFFICE O/P EST MOD 30 MIN: CPT | Performed by: PHYSICIAN ASSISTANT

## 2022-09-29 NOTE — TELEPHONE ENCOUNTER
Patient sees Izabella Askew      Patient is calling stating her pharmacy hasn't called her about her prescription yet  I advised we sent it over and she should check with her pharmacy on the status of it

## 2022-09-29 NOTE — PROGRESS NOTES
Patient Name:  Babatunde Contreras  MRN:  [id number]    Assessment & Plan     Right hand dorsal mass/swelling  Possible ganglion cyst versus extensor tenosynovitis  1  Ultrasound of the right hand/wrist to evaluate mass/swelling  2  Patient requested wrist brace for comfort  This was provided today  3  Prescription for Voltaren gel  4  Follow-up after ultrasound with one of our hand specialists, Dr Lucie Mckinney  Chief Complaint     Right hand mass/swelling  History of the Present Illness     Babatunde Contreras is a 61 y o  right-hand-dominant female who reports to the office today for evaluation of her right hand/wrist   She notes an onset of swelling on the dorsum of the hand/wrist a few weeks ago  She denies any injury or trauma  She notes pain in this region as well  She denies any erythema or drainage  Pain is worse with direct palpation as well as use of the wrist   She notes increased pain with lifting objects as well  She notes weakness due to the pain  No instability  She does have numbness and tingling in the thumb index and long fingers but does report a history of carpal tunnel syndrome as well  She does take over-the-counter ibuprofen with limited improvement  [facility] performed and was advised to follow-up with orthopedics  Physical Exam     /80   Ht 5' 1" (1 549 m)   Wt 93 4 kg (206 lb)   BMI 38 92 kg/m²     Right hand/wrist: Skin intact  No erythema or ecchymosis  There is soft tissue swelling over the dorsum of the hand and wrist in the region of the common extensors  There is palpable collection/mass in this region as well approximately 2 cm in diameter  There is tenderness to palpation  Mass is soft and mobile  Wrist range of motion is intact and nearly full with discomfort noted  Full composite fist formation  Sensation intact but diminished in the median nerve distribution    Sensation intact in the radial ulnar nerves  2+ radial pulse  Eyes: Anicteric sclerae  ENT: Trachea midline  Lungs: Normal respiratory effort  CV: Capillary refill is less than 2 seconds  Skin: Intact without erythema  Lymph: No palpable lymphadenopathy  Neuro: Sensation is grossly intact to light touch  Psych: Mood and affect are appropriate  Past Medical History:   Diagnosis Date    Abdominal hernia     Anxiety     Arthritis     o a  left knee    Asthma     Brain injury ([facility] )     Short term memory problems; confusion; has affected depth perception," injury at work"    COPD (chronic obstructive pulmonary disease) ([facility] )     CPAP (continuous positive airway pressure) dependence     Depression     Environmental allergies     Fatty liver     GERD (gastroesophageal reflux disease)     History of gestational diabetes     Was on insulin    History of laparoscopic adjustable gastric banding     History of recent fall     3/2018    Hypertension     Knee pain, right     Obese     Pneumonia     x1    RA (rheumatoid arthritis) ([facility] )     Risk for falls     Shortness of breath     "sometimes with asthma"    Shoulder pain, bilateral     pain started after a fall in     Sleep apnea     Vomiting        Past Surgical History:   Procedure Laterality Date    ANAL FISSURECTOMY       SECTION      x1    ELBOW SURGERY Left     FOOT SURGERY Bilateral     5th toes    JOINT REPLACEMENT Bilateral     knees    LAPAROSCOPIC GASTRIC BANDING  2010    Dr Awais Ogden, Doc Bence  PR EGD TRANSORAL BIOPSY SINGLE/MULTIPLE N/A 1/3/2018    Procedure: ESOPHAGOGASTRODUODENOSCOPY (EGD) with biopsy;  Surgeon: Serenity Gilbert MD;  Location: [facility];   Service: Bariatrics    SKIN BIOPSY         Allergies   Allergen Reactions    Other Rash     Adhesive tape and adhesive bandaids    Sulfasalazine Rash    Taltz [Ixekizumab] Itching, Swelling and Rash    Wound Dressing Adhesive Itching and Rash    Adalimumab Itching and Rash       Current Outpatient Medications on File Prior to Visit   Medication Sig Dispense Refill    Abatacept (Orencia ClickJect) 814 MG/ML SOAJ Inject 125 mg SC weekly 4 mL 11    acetaminophen (TYLENOL) 650 mg CR tablet Take 650 mg by mouth every 8 (eight) hours as needed        albuterol (2 5 mg/3 mL) 0 083 % nebulizer solution Take 1 vial (2 5 mg total) by nebulization every 6 (six) hours as needed for wheezing 150 mL 3    albuterol (PROVENTIL HFA,VENTOLIN HFA) 90 mcg/act inhaler Inhale 2 puffs 4 (four) times a day As directed 3 Inhaler 1    budesonide-formoterol (SYMBICORT) 160-4 5 mcg/act inhaler Inhale 2 puffs 2 (two) times a day      buPROPion (WELLBUTRIN SR) 150 mg 12 hr tablet 2 am, 1 pm (Patient taking differently: 2 (two) times a day) 90 tablet 5    clobetasol (TEMOVATE) 0 05 % ointment Apply topically 2 (two) times a day (Patient taking differently: Apply topically 2 (two) times a day as needed) 60 g 3    clobetasol (TEMOVATE) 0 05 % ointment Apply topically 2 (two) times a day For 2 weeks with one week break in between use  Don't use on face, groin, axilla 60 g 0    diclofenac sodium (VOLTAREN) 1 % Apply 4 g topically 4 (four) times a day as needed (pain) 300 g 6    Diclofenac Sodium (VOLTAREN) 1 % Apply 2 g topically 4 (four) times a day 100 g 6    fluocinonide (LIDEX) 0 05 % external solution Apply topically 2 (two) times a day To affected area on scalp for 14 days maximum  Can also use on the ear  Don't use on face, groin, axilla  60 mL 2    fluticasone (FLONASE) 50 mcg/act nasal spray 1 spray into each nostril as needed        hydrochlorothiazide (HYDRODIURIL) 25 mg tablet TAKE 1 TABLET (25 MG TOTAL) BY MOUTH DAILY        hydrOXYzine HCL (ATARAX) 25 mg tablet TAKE 1 TABLET (25 MG TOTAL) BY MOUTH EVERY 6 (SIX) HOURS AS NEEDED FOR ITCHING (Patient not taking: No sig reported) 120 tablet 0    ketoconazole (NIZORAL) 2 % shampoo Daily for 2 weeks straight and then on "Mondays,  and ":  Lather into scalp and skin on face, neck, chest, and back; leave on for 5 minutes and then rinse off completely  120 mL 3    leflunomide (ARAVA) 10 MG tablet TAKE 1 TABLET BY MOUTH EVERY DAY (Patient not taking: No sig reported) 30 tablet 3    leflunomide (ARAVA) 20 MG tablet Take 1 tablet (20 mg total) by mouth daily (Patient not taking: No sig reported) 30 tablet 3    levalbuterol (XOPENEX HFA) 45 mcg/act inhaler Inhale 1-2 puffs every 4 (four) hours as needed for wheezing 1 Inhaler 5    montelukast (SINGULAIR) 10 mg tablet TAKE 1 TABLET BY MOUTH DAILY AT BEDTIME 90 tablet 0    mupirocin (BACTROBAN) 2 % ointment Apply twice daily to open sores (Patient not taking: No sig reported) 22 g 3    mupirocin (BACTROBAN) 2 % ointment Apply topically 2 (two) times a day (Patient not taking: No sig reported) 22 g 0    nitrofurantoin (MACROBID) 100 mg capsule TAKE 1 CAPSULE BY MOUTH TWICE A DAY WITH FOOD OR MILK      nystatin (MYCOSTATIN) powder Apply topically 3 (three) times a day (Patient not taking: Reported on 2022) 60 g 3    Risankizumab-rzaa (Skyrizi Pen) 150 MG/ML SOAJ Inject 150 mg subcutaneously on week 16 then every 12 weeks there after for maintenance dose  1 mL 4    Risankizumab-rzaa (Skyrizi Pen) 150 MG/ML SOAJ Inject 150 mg subcutaneously on week 0 and week 4 for loading dose  (Patient not taking: Reported on 2022) 2 mL 0    valsartan-hydrochlorothiazide (DIOVAN-HCT) 160-25 MG per tablet Take 1 tablet by mouth daily (Patient not taking: Reported on 2022) 90 tablet 1     No current facility-administered medications on file prior to visit         Social History     Tobacco Use    Smoking status: Former Smoker     Packs/day: 0 15     Years: 3 00     Pack years: 0 45     Types: Cigarettes     Quit date: 2020     Years since quittin 8    Smokeless tobacco: Never Used   Vaping Use    Vaping Use: Never used   Substance Use Topics    Alcohol use: Not Currently    Drug use: No       Family History   Problem Relation Age of Onset    Diabetes type II Mother     Hypertension Mother    Exlindsey Nicole Arthritis Mother     Congenital heart disease Mother    Exlindsey Nicole Stroke Mother     Diabetes Mother     Diabetes type II Father     Hypertension Father     Asthma Father     Diabetes Father     Arthritis Father     Sudden death Other 28        niece    Asthma Sister     Diabetes Sister     Arthritis Sister     Asthma Brother     Diabetes Brother     Seizures Son     Asthma Maternal Aunt        Review of Systems     As stated in the HPI  All other systems reviewed and are negative

## 2022-09-30 NOTE — TELEPHONE ENCOUNTER
Patient called and said her son was admitted to the hospital last week and she hasn't used her machine since then because no one has gotten back to her about whether or not she can bring her machine into the hospital while she stays with her son   She wanted to inform someone as to why she wasn't using her machine because she does not want her machine taken away from her Leg numbness Leg numbness

## 2022-10-19 ENCOUNTER — HOSPITAL ENCOUNTER (OUTPATIENT)
Dept: ULTRASOUND IMAGING | Facility: HOSPITAL | Age: 57
Discharge: HOME | End: 2022-10-19
Payer: COMMERCIAL

## 2022-10-19 DIAGNOSIS — M67.441 GANGLION, RIGHT HAND: ICD-10-CM

## 2022-10-19 PROCEDURE — 76882 US LMTD JT/FCL EVL NVASC XTR: CPT

## 2022-10-24 ENCOUNTER — OFFICE VISIT (OUTPATIENT)
Dept: OBGYN CLINIC | Facility: MEDICAL CENTER | Age: 57
End: 2022-10-24
Payer: COMMERCIAL

## 2022-10-24 VITALS
DIASTOLIC BLOOD PRESSURE: 82 MMHG | HEIGHT: 61 IN | HEART RATE: 84 BPM | WEIGHT: 205.8 LBS | BODY MASS INDEX: 38.86 KG/M2 | SYSTOLIC BLOOD PRESSURE: 119 MMHG

## 2022-10-24 DIAGNOSIS — R20.0 NUMBNESS AND TINGLING IN RIGHT HAND: ICD-10-CM

## 2022-10-24 DIAGNOSIS — M65.9 SYNOVITIS OF RIGHT WRIST: ICD-10-CM

## 2022-10-24 DIAGNOSIS — M25.531 PAIN IN RIGHT WRIST: Primary | ICD-10-CM

## 2022-10-24 DIAGNOSIS — R20.2 NUMBNESS AND TINGLING IN RIGHT HAND: ICD-10-CM

## 2022-10-24 PROCEDURE — 99243 OFF/OP CNSLTJ NEW/EST LOW 30: CPT | Performed by: SURGERY

## 2022-10-24 NOTE — PROGRESS NOTES
ORTHOPAEDIC HAND, WRIST, AND ELBOW OFFICE  VISIT       ASSESSMENT/PLAN:      61 y o Female who presents with Right wrist synovitis    Diagnostics reviewed and physical exam performed  Diagnosis, treatment options and associated risks were discussed with the patient including no treatment, nonsurgical treatment and potential for surgical intervention  The patient was given the opportunity to ask questions regarding each  The patient verbalized understanding of exam findings and treatment plan  We engaged in the shared decision-making process and treatment options were discussed at length with the patient  Surgical and conservative management discussed today along with risks and benefits  Bilateral hand films reviewed indepedently from 2019:  No significant bony changes  Examination consistent with dorsal wrist synovitis, not a ganglion cyst   She has tenderness over most of her extensor compartments  She has an underlying systemic inflammatory condition which is the likely cause of her present dorsal wrist symptoms  She notes some hand numbness as well  Will monitor and start night time wrist splinting for both symptoms  Pt was advised to use Anti inflammatories to reduce pain  She was prescribed and fitted for a cock up splint for her right wrist for night time use    Pt understood and had no further questions    Diagnoses and all orders for this visit:    Pain in right wrist  -     Brace    Synovitis of right wrist    Numbness and tingling in right hand        Follow Up:  Return in about 8 weeks (around 12/19/2022) for Recheck      To Do Next Visit:  Re-evaluation of current issue      ____________________________________________________________________________________________________________________________________________      CHIEF COMPLAINT:  Chief Complaint   Patient presents with   • Right Hand - Swelling       SUBJECTIVE:  Braden Virgen is a 61y o  year old  female who presents today for evaluation of Right wrist pain Referred by Jarod BOWDEN     Pt states that she is having pain and swelling on the dorsum of her right wrist for the last few weeks  She denies injury  She states she wore a brace but it caused irritation at the dorsum of her wrist  She had limited relief from OTC pain medications  She reports weakness in her hand along with numbness in her digits  She gets shooting pain in the ulnar aspect of her wrist/forearm    She also has Rheumatoid arthitis     I have personally reviewed all the relevant PMH, PSH, SH, FH, Medications and allergies      PAST MEDICAL HISTORY:  Past Medical History:   Diagnosis Date   • Abdominal hernia    • Anxiety    • Arthritis     o a  left knee   • Asthma    • Brain injury 2014    Short term memory problems; confusion; has affected depth perception," injury at work"   • COPD (chronic obstructive pulmonary disease) ([facility] )    • CPAP (continuous positive airway pressure) dependence    • Depression    • Environmental allergies    • Fatty liver    • GERD (gastroesophageal reflux disease)    • History of gestational diabetes     Was on insulin   • History of laparoscopic adjustable gastric banding    • History of recent fall     3/2018   • Hypertension    • Knee pain, right    • Obese    • Pneumonia     x1   • RA (rheumatoid arthritis) ([facility] )    • Risk for falls    • Shortness of breath     "sometimes with asthma"   • Shoulder pain, bilateral     pain started after a fall in    • Sleep apnea    • Vomiting        PAST SURGICAL HISTORY:  Past Surgical History:   Procedure Laterality Date   • ANAL FISSURECTOMY     •  SECTION      x1   • ELBOW SURGERY Left    • FOOT SURGERY Bilateral     5th toes   • JOINT REPLACEMENT Bilateral     knees   • LAPAROSCOPIC GASTRIC BANDING  2010    Kiel Richmond   • MO EGD TRANSORAL BIOPSY SINGLE/MULTIPLE N/A 1/3/2018    Procedure: ESOPHAGOGASTRODUODENOSCOPY (EGD) with biopsy;  Surgeon: Kristopher Cool MD; Location: [facility];   Service: Bariatrics   • SKIN BIOPSY         FAMILY HISTORY:  Family History   Problem Relation Age of Onset   • Diabetes type II Mother    • Hypertension Mother    • Arthritis Mother    • Congenital heart disease Mother    • Stroke Mother    • Diabetes Mother    • Diabetes type II Father    • Hypertension Father    • Asthma Father    • Diabetes Father    • Arthritis Father    • Sudden death Other 28        niece   • Asthma Sister    • Diabetes Sister    • Arthritis Sister    • Asthma Brother    • Diabetes Brother    • Seizures Son    • Asthma Maternal Aunt        SOCIAL HISTORY:  Social History     Tobacco Use   • Smoking status: Former Smoker     Packs/day: 0 15     Years: 3 00     Pack years: 0 45     Types: Cigarettes     Quit date: 2020     Years since quittin 8   • Smokeless tobacco: Never Used   Vaping Use   • Vaping Use: Never used   Substance Use Topics   • Alcohol use: Not Currently   • Drug use: No       MEDICATIONS:    Current Outpatient Medications:   •  Abatacept (Orencia ClickJect) 264 MG/ML SOAJ, Inject 125 mg SC weekly, Disp: 4 mL, Rfl: 11  •  acetaminophen (TYLENOL) 650 mg CR tablet, Take 650 mg by mouth every 8 (eight) hours as needed  , Disp: , Rfl:   •  albuterol (2 5 mg/3 mL) 0 083 % nebulizer solution, Take 1 vial (2 5 mg total) by nebulization every 6 (six) hours as needed for wheezing, Disp: 150 mL, Rfl: 3  •  albuterol (PROVENTIL HFA,VENTOLIN HFA) 90 mcg/act inhaler, Inhale 2 puffs 4 (four) times a day As directed, Disp: 3 Inhaler, Rfl: 1  •  budesonide-formoterol (SYMBICORT) 160-4 5 mcg/act inhaler, Inhale 2 puffs 2 (two) times a day, Disp: , Rfl:   •  buPROPion (WELLBUTRIN SR) 150 mg 12 hr tablet, 2 am, 1 pm (Patient taking differently: 2 (two) times a day), Disp: 90 tablet, Rfl: 5  •  clobetasol (TEMOVATE) 0 05 % ointment, Apply topically 2 (two) times a day (Patient taking differently: Apply topically 2 (two) times a day as needed), Disp: 60 g, Rfl: 3  • clobetasol (TEMOVATE) 0 05 % ointment, Apply topically 2 (two) times a day For 2 weeks with one week break in between use  Don't use on face, groin, axilla, Disp: 60 g, Rfl: 0  •  diclofenac sodium (VOLTAREN) 1 %, Apply 4 g topically 4 (four) times a day as needed (pain), Disp: 300 g, Rfl: 6  •  Diclofenac Sodium (VOLTAREN) 1 %, Apply 2 g topically 4 (four) times a day, Disp: 100 g, Rfl: 6  •  Diclofenac Sodium (VOLTAREN) 1 %, Apply 2 g topically 4 (four) times a day, Disp: 100 g, Rfl: 0  •  fluocinonide (LIDEX) 0 05 % external solution, Apply topically 2 (two) times a day To affected area on scalp for 14 days maximum  Can also use on the ear  Don't use on face, groin, axilla , Disp: 60 mL, Rfl: 2  •  fluticasone (FLONASE) 50 mcg/act nasal spray, 1 spray into each nostril as needed  , Disp: , Rfl:   •  hydrochlorothiazide (HYDRODIURIL) 25 mg tablet, TAKE 1 TABLET (25 MG TOTAL) BY MOUTH DAILY  , Disp: , Rfl:   •  hydrOXYzine HCL (ATARAX) 25 mg tablet, TAKE 1 TABLET (25 MG TOTAL) BY MOUTH EVERY 6 (SIX) HOURS AS NEEDED FOR ITCHING (Patient not taking: No sig reported), Disp: 120 tablet, Rfl: 0  •  ketoconazole (NIZORAL) 2 % shampoo, Daily for 2 weeks straight and then on "Mondays, Wednesdays and Fridays":  Lather into scalp and skin on face, neck, chest, and back; leave on for 5 minutes and then rinse off completely  , Disp: 120 mL, Rfl: 3  •  leflunomide (ARAVA) 10 MG tablet, TAKE 1 TABLET BY MOUTH EVERY DAY (Patient not taking: No sig reported), Disp: 30 tablet, Rfl: 3  •  leflunomide (ARAVA) 20 MG tablet, Take 1 tablet (20 mg total) by mouth daily (Patient not taking: No sig reported), Disp: 30 tablet, Rfl: 3  •  levalbuterol (XOPENEX HFA) 45 mcg/act inhaler, Inhale 1-2 puffs every 4 (four) hours as needed for wheezing, Disp: 1 Inhaler, Rfl: 5  •  montelukast (SINGULAIR) 10 mg tablet, TAKE 1 TABLET BY MOUTH DAILY AT BEDTIME, Disp: 90 tablet, Rfl: 0  •  mupirocin (BACTROBAN) 2 % ointment, Apply twice daily to open sores (Patient not taking: No sig reported), Disp: 22 g, Rfl: 3  •  mupirocin (BACTROBAN) 2 % ointment, Apply topically 2 (two) times a day (Patient not taking: No sig reported), Disp: 22 g, Rfl: 0  •  nitrofurantoin (MACROBID) 100 mg capsule, TAKE 1 CAPSULE BY MOUTH TWICE A DAY WITH FOOD OR MILK, Disp: , Rfl:   •  nystatin (MYCOSTATIN) powder, Apply topically 3 (three) times a day (Patient not taking: Reported on 9/26/2022), Disp: 60 g, Rfl: 3  •  Risankizumab-rzaa (Skyrizi Pen) 150 MG/ML SOAJ, Inject 150 mg subcutaneously on week 16 then every 12 weeks there after for maintenance dose , Disp: 1 mL, Rfl: 4  •  Risankizumab-rzaa (Skyrizi Pen) 150 MG/ML SOAJ, Inject 150 mg subcutaneously on week 0 and week 4 for loading dose  (Patient not taking: Reported on 9/26/2022), Disp: 2 mL, Rfl: 0  •  valsartan-hydrochlorothiazide (DIOVAN-HCT) 160-25 MG per tablet, Take 1 tablet by mouth daily (Patient not taking: Reported on 8/9/2022), Disp: 90 tablet, Rfl: 1    ALLERGIES:  Allergies   Allergen Reactions   • Other Rash     Adhesive tape and adhesive bandaids   • Sulfasalazine Rash   • Taltz [Ixekizumab] Itching, Swelling and Rash   • Wound Dressing Adhesive Itching and Rash   • Adalimumab Itching and Rash           REVIEW OF SYSTEMS:  Review of Systems   Constitutional: Negative for chills and fever  HENT: Negative for ear pain and sore throat  Eyes: Negative for pain and visual disturbance  Respiratory: Negative for cough and shortness of breath  Cardiovascular: Negative for chest pain and palpitations  Gastrointestinal: Negative for abdominal pain and vomiting  Genitourinary: Negative for dysuria and hematuria  Musculoskeletal: Negative for arthralgias and back pain  Skin: Negative for color change and rash  Neurological: Negative for seizures and syncope  All other systems reviewed and are negative        VITALS:  Vitals:    10/24/22 0916   BP: 119/82   Pulse: 84       LABS:  HgA1c:   Lab Results Component Value Date    HGBA1C 5 8 (H) 09/17/2021     BMP:   Lab Results   Component Value Date    GLUCOSE 85 08/14/2015    CALCIUM 9 5 02/10/2022     08/14/2015    K 3 8 02/10/2022    CO2 28 02/10/2022     02/10/2022    BUN 16 02/10/2022    CREATININE 0 74 02/10/2022       _____________________________________________________  PHYSICAL EXAMINATION:  General: well developed and well nourished, alert, oriented times 3 and appears comfortable  Psychiatric: Normal  HEENT: Normocephalic, Atraumatic Trachea Midline, No torticollis  Pulmonary: No audible wheezing or respiratory distress   Abdomen/GI: Non tender, non distended   Cardiovascular: No pitting edema, 2+ radial pulse   Skin: Small mass dorsum of right wrist, erythema, lacerations, fluctation, ulcerations  Neurovascular: Sensation Intact to the Median, Ulnar, Radial Nerve, Motor Intact to the Median, Ulnar, Radial Nerve and Pulses Intact  Musculoskeletal: Normal, except as noted in detailed exam and in HPI        MUSCULOSKELETAL EXAMINATION:   dorsal wrist synovitis over most of dorsal wrist, tender over most of dorsal wrist compartments, no erythema, no induration, able to make a full fist complex, SILT  ___________________________________________________  STUDIES REVIEWED:  I have personally reviewed AP lateral and oblique radiographs of Right which demonstrate   No significant bony changes from 2019        PROCEDURES PERFORMED:  Procedures  No Procedures performed today    _____________________________________________________      Casi Navarro    I,:  Donny Cherry am acting as a scribe while in the presence of the attending physician :       I,:  Isatu Gotti MD personally performed the services described in this documentation    as scribed in my presence :

## 2022-10-24 NOTE — LETTER
October 24, 2022     Jan Coffman, [address] [facility] [address]    Patient: Johanny Duarte   YOB: 1965   Date of Visit: 10/24/2022       Dear Dr Terese Muhammad: Thank you for referring Johanny Duarte to me for evaluation  Below are my notes for this consultation  If you have questions, please do not hesitate to call me  I look forward to following your patient along with you  Sincerely,        Sukhdeep Huntley MD        CC: No Recipients  Sukhdeep Huntley MD  10/24/2022 10:10 AM  Signed  ORTHOPAEDIC HAND, WRIST, AND ELBOW OFFICE  VISIT       ASSESSMENT/PLAN:      61 y o Female who presents with Right wrist synovitis    Diagnostics reviewed and physical exam performed  Diagnosis, treatment options and associated risks were discussed with the patient including no treatment, nonsurgical treatment and potential for surgical intervention  The patient was given the opportunity to ask questions regarding each  The patient verbalized understanding of exam findings and treatment plan  We engaged in the shared decision-making process and treatment options were discussed at length with the patient  Surgical and conservative management discussed today along with risks and benefits  Bilateral hand films reviewed indepedently from 2019:  No significant bony changes  Examination consistent with dorsal wrist synovitis, not a ganglion cyst   She has tenderness over most of her extensor compartments  She has an underlying systemic inflammatory condition which is the likely cause of her present dorsal wrist symptoms  She notes some hand numbness as well  Will monitor and start night time wrist splinting for both symptoms        Pt was advised to use Anti inflammatories to reduce pain  She was prescribed and fitted for a cock up splint for her right wrist for night time use    Pt understood and had no further questions    Diagnoses and all orders for this visit:    Pain in right wrist  -     Brace    Synovitis of right wrist    Numbness and tingling in right hand        Follow Up:  Return in about 8 weeks (around 12/19/2022) for Recheck  To Do Next Visit:  Re-evaluation of current issue      ____________________________________________________________________________________________________________________________________________      CHIEF COMPLAINT:  Chief Complaint   Patient presents with   • Right Hand - Swelling       SUBJECTIVE:  Apryl Erazo is a 61y o  year old  female who presents today for evaluation of Right wrist pain Referred by [facility]     Pt states that she is having pain and swelling on the dorsum of her right wrist for the last few weeks  She denies injury  She states she wore a brace but it caused irritation at the dorsum of her wrist  She had limited relief from OTC pain medications  She reports weakness in her hand along with numbness in her digits  She gets shooting pain in the ulnar aspect of her wrist/forearm    She also has Rheumatoid arthitis     I have personally reviewed all the relevant PMH, PSH, SH, FH, Medications and allergies      PAST MEDICAL HISTORY:  Past Medical History:   Diagnosis Date   • Abdominal hernia    • Anxiety    • Arthritis     o a  left knee   • Asthma    • Brain injury 2014    Short term memory problems; confusion; has affected depth perception," injury at work"   • COPD (chronic obstructive pulmonary disease) ([facility] [address] )    • CPAP (continuous positive airway pressure) dependence    • Depression    • Environmental allergies    • Fatty liver    • GERD (gastroesophageal reflux disease)    • History of gestational diabetes     Was on insulin   • History of laparoscopic adjustable gastric banding    • History of recent fall     3/2018   • Hypertension    • Knee pain, right    • Obese    • Pneumonia     x1   • RA (rheumatoid arthritis) ([facility] [address] )    • Risk for falls    • Shortness of breath     "sometimes with asthma"   • Shoulder pain, bilateral     pain started after a fall in    • Sleep apnea    • Vomiting        PAST SURGICAL HISTORY:  Past Surgical History:   Procedure Laterality Date   • ANAL FISSURECTOMY     •  SECTION      x1   • ELBOW SURGERY Left    • FOOT SURGERY Bilateral     5th toes   • JOINT REPLACEMENT Bilateral     knees   • LAPAROSCOPIC GASTRIC BANDING  2010    Dr Srinath Hnery   • [place] EGD TRANSORAL BIOPSY SINGLE/MULTIPLE N/A 1/3/2018    Procedure: ESOPHAGOGASTRODUODENOSCOPY (EGD) with biopsy;  Surgeon: Blessing Dietz MD;  Location: [facility];   Service: Bariatrics   • SKIN BIOPSY         FAMILY HISTORY:  Family History   Problem Relation Age of Onset   • Diabetes type II Mother    • Hypertension Mother    • Arthritis Mother    • Congenital heart disease Mother    • Stroke Mother    • Diabetes Mother    • Diabetes type II Father    • Hypertension Father    • Asthma Father    • Diabetes Father    • Arthritis Father    • Sudden death Other 28        niece   • Asthma Sister    • Diabetes Sister    • Arthritis Sister    • Asthma Brother    • Diabetes Brother    • Seizures Son    • Asthma Maternal Aunt        SOCIAL HISTORY:  Social History     Tobacco Use   • Smoking status: Former Smoker     Packs/day: 0 15     Years: 3 00     Pack years: 0 45     Types: Cigarettes     Quit date: 2020     Years since quittin 8   • Smokeless tobacco: Never Used   Vaping Use   • Vaping Use: Never used   Substance Use Topics   • Alcohol use: Not Currently   • Drug use: No       MEDICATIONS:    Current Outpatient Medications:   •  Abatacept (Orencia ClickJect) 130 MG/ML SOAJ, Inject 125 mg SC weekly, Disp: 4 mL, Rfl: 11  •  acetaminophen (TYLENOL) 650 mg CR tablet, Take 650 mg by mouth every 8 (eight) hours as needed  , Disp: , Rfl:   •  albuterol (2 5 mg/3 mL) 0 083 % nebulizer solution, Take 1 vial (2 5 mg total) by nebulization every 6 (six) hours as needed for wheezing, Disp: 150 mL, Rfl: 3  •  albuterol (PROVENTIL HFA,VENTOLIN HFA) 90 mcg/act inhaler, Inhale 2 puffs 4 (four) times a day As directed, Disp: 3 Inhaler, Rfl: 1  •  budesonide-formoterol (SYMBICORT) 160-4 5 mcg/act inhaler, Inhale 2 puffs 2 (two) times a day, Disp: , Rfl:   •  buPROPion (WELLBUTRIN SR) 150 mg 12 hr tablet, 2 am, 1 pm (Patient taking differently: 2 (two) times a day), Disp: 90 tablet, Rfl: 5  •  clobetasol (TEMOVATE) 0 05 % ointment, Apply topically 2 (two) times a day (Patient taking differently: Apply topically 2 (two) times a day as needed), Disp: 60 g, Rfl: 3  •  clobetasol (TEMOVATE) 0 05 % ointment, Apply topically 2 (two) times a day For 2 weeks with one week break in between use  Don't use on face, groin, axilla, Disp: 60 g, Rfl: 0  •  diclofenac sodium (VOLTAREN) 1 %, Apply 4 g topically 4 (four) times a day as needed (pain), Disp: 300 g, Rfl: 6  •  Diclofenac Sodium (VOLTAREN) 1 %, Apply 2 g topically 4 (four) times a day, Disp: 100 g, Rfl: 6  •  Diclofenac Sodium (VOLTAREN) 1 %, Apply 2 g topically 4 (four) times a day, Disp: 100 g, Rfl: 0  •  fluocinonide (LIDEX) 0 05 % external solution, Apply topically 2 (two) times a day To affected area on scalp for 14 days maximum  Can also use on the ear  Don't use on face, groin, axilla , Disp: 60 mL, Rfl: 2  •  fluticasone (FLONASE) 50 mcg/act nasal spray, 1 spray into each nostril as needed  , Disp: , Rfl:   •  hydrochlorothiazide (HYDRODIURIL) 25 mg tablet, TAKE 1 TABLET (25 MG TOTAL) BY MOUTH DAILY  , Disp: , Rfl:   •  hydrOXYzine HCL (ATARAX) 25 mg tablet, TAKE 1 TABLET (25 MG TOTAL) BY MOUTH EVERY 6 (SIX) HOURS AS NEEDED FOR ITCHING (Patient not taking: No sig reported), Disp: 120 tablet, Rfl: 0  •  ketoconazole (NIZORAL) 2 % shampoo, Daily for 2 weeks straight and then on "Mondays, Wednesdays and Fridays":  Lather into scalp and skin on face, neck, chest, and back; leave on for 5 minutes and then rinse off completely  , Disp: 120 mL, Rfl: 3  •  leflunomide (ARAVA) 10 MG tablet, TAKE 1 TABLET BY MOUTH EVERY DAY (Patient not taking: No sig reported), Disp: 30 tablet, Rfl: 3  •  leflunomide (ARAVA) 20 MG tablet, Take 1 tablet (20 mg total) by mouth daily (Patient not taking: No sig reported), Disp: 30 tablet, Rfl: 3  •  levalbuterol (XOPENEX HFA) 45 mcg/act inhaler, Inhale 1-2 puffs every 4 (four) hours as needed for wheezing, Disp: 1 Inhaler, Rfl: 5  •  montelukast (SINGULAIR) 10 mg tablet, TAKE 1 TABLET BY MOUTH DAILY AT BEDTIME, Disp: 90 tablet, Rfl: 0  •  mupirocin (BACTROBAN) 2 % ointment, Apply twice daily to open sores (Patient not taking: No sig reported), Disp: 22 g, Rfl: 3  •  mupirocin (BACTROBAN) 2 % ointment, Apply topically 2 (two) times a day (Patient not taking: No sig reported), Disp: 22 g, Rfl: 0  •  nitrofurantoin (MACROBID) 100 mg capsule, TAKE 1 CAPSULE BY MOUTH TWICE A DAY WITH FOOD OR MILK, Disp: , Rfl:   •  nystatin (MYCOSTATIN) powder, Apply topically 3 (three) times a day (Patient not taking: Reported on 9/26/2022), Disp: 60 g, Rfl: 3  •  Risankizumab-rzaa (Skyrizi Pen) 150 MG/ML SOAJ, Inject 150 mg subcutaneously on week 16 then every 12 weeks there after for maintenance dose , Disp: 1 mL, Rfl: 4  •  Risankizumab-rzaa (Skyrizi Pen) 150 MG/ML SOAJ, Inject 150 mg subcutaneously on week 0 and week 4 for loading dose  (Patient not taking: Reported on 9/26/2022), Disp: 2 mL, Rfl: 0  •  valsartan-hydrochlorothiazide (DIOVAN-HCT) 160-25 MG per tablet, Take 1 tablet by mouth daily (Patient not taking: Reported on 8/9/2022), Disp: 90 tablet, Rfl: 1    ALLERGIES:  Allergies   Allergen Reactions   • Other Rash     Adhesive tape and adhesive bandaids   • Sulfasalazine Rash   • Taltz [Ixekizumab] Itching, Swelling and Rash   • Wound Dressing Adhesive Itching and Rash   • Adalimumab Itching and Rash           REVIEW OF SYSTEMS:  Review of Systems   Constitutional: Negative for chills and fever  HENT: Negative for ear pain and sore throat  Eyes: Negative for pain and visual disturbance  Respiratory: Negative for cough and shortness of breath  Cardiovascular: Negative for chest pain and palpitations  Gastrointestinal: Negative for abdominal pain and vomiting  Genitourinary: Negative for dysuria and hematuria  Musculoskeletal: Negative for arthralgias and back pain  Skin: Negative for color change and rash  Neurological: Negative for seizures and syncope  All other systems reviewed and are negative  VITALS:  Vitals:    10/24/22 0916   BP: 119/82   Pulse: 84       LABS:  HgA1c:   Lab Results   Component Value Date    HGBA1C 5 8 (H) 09/17/2021     BMP:   Lab Results   Component Value Date    GLUCOSE 85 08/14/2015    CALCIUM 9 5 02/10/2022     08/14/2015    K 3 8 02/10/2022    CO2 28 02/10/2022     02/10/2022    BUN 16 02/10/2022    CREATININE 0 74 02/10/2022       _____________________________________________________  PHYSICAL EXAMINATION:  General: well developed and well nourished, alert, oriented times 3 and appears comfortable  Psychiatric: Normal  HEENT: Normocephalic, Atraumatic Trachea Midline, No torticollis  Pulmonary: No audible wheezing or respiratory distress   Abdomen/GI: Non tender, non distended   Cardiovascular: No pitting edema, 2+ radial pulse   Skin: Small mass dorsum of right wrist, erythema, lacerations, fluctation, ulcerations  Neurovascular: Sensation Intact to the Median, Ulnar, Radial Nerve, Motor Intact to the Median, Ulnar, Radial Nerve and Pulses Intact  Musculoskeletal: Normal, except as noted in detailed exam and in HPI        MUSCULOSKELETAL EXAMINATION:   dorsal wrist synovitis over most of dorsal wrist, tender over most of dorsal wrist compartments, no erythema, no induration, able to make a full fist complex, SILT  ___________________________________________________  STUDIES REVIEWED:  I have personally reviewed AP lateral and oblique radiographs of Right which demonstrate   No significant bony changes from 2019        PROCEDURES PERFORMED:  Procedures  No Procedures performed today    _____________________________________________________      Guillermina Gee    I,:  Masood Lopez am acting as a scribe while in the presence of the attending physician :       I,:  Ricarda Kocher, MD personally performed the services described in this documentation    as scribed in my presence :

## 2022-11-03 ENCOUNTER — OFFICE VISIT (OUTPATIENT)
Dept: RHEUMATOLOGY | Facility: CLINIC | Age: 57
End: 2022-11-03

## 2022-11-03 VITALS
SYSTOLIC BLOOD PRESSURE: 118 MMHG | HEIGHT: 61 IN | WEIGHT: 199 LBS | DIASTOLIC BLOOD PRESSURE: 80 MMHG | BODY MASS INDEX: 37.57 KG/M2

## 2022-11-03 DIAGNOSIS — L40.9 PSORIASIS: ICD-10-CM

## 2022-11-03 DIAGNOSIS — M17.0 PRIMARY OSTEOARTHRITIS OF BOTH KNEES: ICD-10-CM

## 2022-11-03 DIAGNOSIS — M06.00 SERONEGATIVE EROSIVE RHEUMATOID ARTHRITIS (HCC): ICD-10-CM

## 2022-11-03 DIAGNOSIS — Z79.899 HIGH RISK MEDICATION USE: ICD-10-CM

## 2022-11-03 DIAGNOSIS — R79.89 LOW VITAMIN D LEVEL: ICD-10-CM

## 2022-11-03 DIAGNOSIS — L40.50 PSORIATIC ARTHRITIS (HCC): Primary | ICD-10-CM

## 2022-11-03 RX ORDER — CYCLOSPORINE 100 MG/1
CAPSULE, GELATIN COATED ORAL
Qty: 35 CAPSULE | Refills: 0 | Status: SHIPPED | OUTPATIENT
Start: 2022-11-03

## 2022-11-03 RX ORDER — MELOXICAM 15 MG/1
15 TABLET ORAL DAILY
Qty: 90 TABLET | Refills: 1 | Status: SHIPPED | OUTPATIENT
Start: 2022-11-03 | End: 2022-11-06

## 2022-11-03 RX ORDER — LEFLUNOMIDE 20 MG/1
20 TABLET ORAL DAILY
Qty: 90 TABLET | Refills: 1 | Status: SHIPPED | OUTPATIENT
Start: 2022-11-03

## 2022-11-03 NOTE — PATIENT INSTRUCTIONS
Continue leflunomide 20mg daily  Take cyclosporine 1 capsule twice a day for a week, then 2 capsule in the morning and 1 capsule in the evening  Can take meloxicam daily as needed for joint pain  Continue diclofenac gel as needed for joint pain  Will work on prior auth for [employer] or [employer] daily  Do labs    Return to clinic in 6 months

## 2022-11-03 NOTE — PROGRESS NOTES
Assessment and Plan: Radha Valdes is a 61 y o  female who presents for follow-up of her psoriatic arthritis (is more likely than seronegative Rheumatoid arthritis)  patient states neither her psoriasis or joint pain is controlled on Skyrizi; would like to switch to another biologic  Would be a good candidate for Rinvoq  Cyclosporine prescribed to help with her current psoriasis flare      Continue leflunomide 20mg daily  Take cyclosporine 1 capsule twice a day for a week, then 2 capsule in the morning and 1 capsule in the evening  Can take meloxicam daily as needed for joint pain  Continue diclofenac gel as needed for joint pain  Will work on prior auth for Rinvoq 15mg daily  Do labs    Return to clinic in 7 months     Plan:  Diagnoses and all orders for this visit:    Psoriatic arthritis ([facility] [address] )    Seronegative erosive rheumatoid arthritis ([facility] [address] )  -     Diclofenac Sodium (VOLTAREN) 1 %; Apply 2 g topically 4 (four) times a day  -     leflunomide (ARAVA) 20 MG tablet; Take 1 tablet (20 mg total) by mouth daily  -     Sedimentation rate, automated  -     Comprehensive metabolic panel  -     C-reactive protein  -     CBC and differential    Psoriasis  -     cycloSPORINE non-modified (SandIMMUNE) 100 mg capsule; Take 1 capsule twice a day for a week, then 2 capsules in morning and 1 capsule in evening for a week    Primary osteoarthritis of both knees  -     Diclofenac Sodium (VOLTAREN) 1 %; Apply 2 g topically 4 (four) times a day  -     meloxicam (Mobic) 15 mg tablet;  Take 1 tablet (15 mg total) by mouth daily    Low vitamin D level  -     Vitamin D 25 hydroxy    High risk medication use  -     Chronic Hepatitis Panel  High risk medication use - Benefits and risks of leflunomide use, including but not limited to gastrointestinal disturbances such as nausea, diarrhea, stomatitis, hair loss, fatigue, leukopenia, and hepatotoxicity were discussed with the patient   CBC, CMP will be monitored regularly      Benefits and risks of CHARITY inhibitor biologic agents like Rinvoq were discussed, and include but are not limited to reactivation of hepatitis B/C or TB, diverticular perforation, hyperlipidemia, hepatotoxicity, VTE, and increased risk of infections  A baseline viral hepatitis panel and TB test were checked  CBC/CMP and lipid panel will be monitored regularly      Follow-up plan: Return to clinic in 7 months           Rheumatic Disease Summary:  Uriel Chery a 56 y  o  female who originally presented on 9/16/19 as a Rheumatology consult referred by her Nael Hassan MD for evaluation and management of her seronegative Rheumatoid arthritis  Patient had a DAS28 score of 3 32, consistent with moderate disease activity  She was having active inflammatory arthritis despite being on prednisone 10mg po daily, which she cannot stay on long-term without being tapered  Patient had already been tried on methotrexate and sulfasalazine DMARD therapy, both of which caused patient to develop a rash  It was deemed appropriate to pursue treatment with a biologic medication such as Humira to better control her disease, though this took several weeks to get approved  Vit  D level was low, which was being supplemented  DEXA scan returned normal  Prescribed diclofenac gel to apply to painful knees and shoulders as needed for both her RA and OA  Ordered bilateral hand and feet x-rays to obtain patient's latest baseline, which revealed an erosion at the 3rd DIP joint on the right hand, which is an atypical location for RA  Since patient had a history of miscarriage, ordered an antiphospholipid panel, which returned unremarkable       She presented for follow-up on 10/29/19, at which time she had not yet started on Humira, and her RA symptoms were overall stable  She was encouraged to go ahead and start Humira while starting to taper her prednisone by 1mg every 2 weeks   Also had started patient on the DMARD hydroxychloroquine 200mg po bid to work in conjunction with Humira to control her RA symptoms  She was to continue her ergocalciferol 50,000 units po weekly for her Vit  D deficiency until her Vit  D level was repeated  She was to continue to apply diclofenac gel as needed for her knee osteoarthritis-related pain       She then presented for follow-up on 12/10/19 as an urgent clinic visit after being managed at the [facility] ED for an extensive rash that she developed as an allergic reaction to Humira, which had significantly improved upon the medication's discontinuation and increased prednisone course  Patient's ANCA and NY-3 were slightly positive 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  Besides rash, which had resolved, patient had no signs or symptoms of a vasculitis, such as kidney or lung involvement  Had decided to hold off initiating any further DMARD or biologic therapy for patient's seronegative RA  Asked patient to increase her prednisone to 30mg po daily for a week, then decrease to 20mg po daily for a week, then 15mg po daily for a week, then 10mg po daily for a week, then go down by 1mg every two weeks as tolerated  She was to continue ergocalciferol 50,000 units po weekly for her Vit  D deficiency  Ordered repeat ANCA and the muscle enzymes CK and aldolase; muscle enzymes returned normal, and her atypical pANCA returned slightly positive at 1:80, though her regular C- and P-ANCA returned negative, as well as MPO and NY-3 returned negative, making it more likely that the patient 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      She next presented for follow-up on 5/18/20 via telemedicine for her seronegative Rheumatoid arthritis  She had a rash to methotrexate, sulfasalazine, and Humira so far  Was considering starting patient on leflunomide 10mg po daily for DMARD therapy, so that her prednisone could be tapered down in the future   Ordered baseline CBC, CMP prior to starting per patient's request, and ESR, CRP for disease activity monitoring  She was to continue prednisone 10mg po daily for time-being, and diclofenac gel as needed for joint pain      8/6/20: Her right shoulder has been bothering her as well as her right knee, in which she has OA  She can restart taking HCQ 200mg po bid, since she didn't actually have a reaction to it in the past, rather it was Humira that she developed a severe rash to  She can use diclofenac gel or ibuprofen for knee pain as needed (asked her to not use them at the same time)  She is to continue 2,000 units a day of Vit  D for low Vit  D  Also, she is to contact Orthopedics regarding possible knee replacement      6/10/21: With patient's recently active psoriasis, it is now apparent that she actually has psoriatic arthritis rather than seronegative RA, especially since her x-rays reveal inflammatory joint changes at her DIPs, which is much more common in psoriatic arthritis than RA  Patient would be a good candidate for Taltz to help both her extensive psoriasis as well as arthritis symptoms; it would be 160mg once followed by 80mg every 4 weeks  She had a severe rash allergic reaction to Humira in the past, so is hesitant to try any other TNF inhibitors at this time  Has had allergic reactions of rash to methotrexate sulfasalazine in the past  Restarted leflunomide 10mg po daily while awaiting Taltz approval; do not believe leflunomide alone can help resolved patient's extensive skin psoriasis  Can use clobetasol ointment or powder as needed for psoriasis  Take 2,000 units a day of Vit  D over the counter  Can take diclofenac and diclofenac gel as needed for joint pain  Orthopedics referral made for patient to re-establish with Dr Aramis Salcedo; is s/p bilateral knee replacement at [facility], but patient wants to follow-up with [facility]     8/2/21: The honey crusted lesions on her knees seem consistent with impetigo; asked patient to finish doxycycline course twice a day    Recommend that she follow up with Dermatology as soon as possible  Also recommended that she finish 14 days ibuprofen 3 times a day for possible impetigo on her knees  Asked patient to hold the [address] until the coast and some areas on her her knees completely heal  If crusted areas don't heal, patient is to let me know and I can prescribe another antibiotic such as Bactrim  Continue leflunomide 10mg po daily  Provided wound care for her knees lesions      11/2/21: Patient was not having complete improvement/resolution of symptoms on Taltz, despite being on it for several months; it temporarily helped her rash but had not helped her joint pain  She wanted like to try Orencia weekly injections as an alternative  She had already tried and failed or had adverse effects to methotrexate, Humira, hydroxychloroquine, sulfasalazine, and now [address]  She also had been successfully tapered off prednisone, and did not wish to restart it  Increased her leflunomide dose to 20 mg p o  daily; however, patient needed the addition of a biologic for better control of her disease      2/10/22: Patient was started on Orensia for  about 3 month ago  Her leflunomide dose was increased last visit  Patient tolerates medications well now  She was on Taltz before that did not keep her symptoms under control  She previously had side effects on methotrexate, humira, hydrochloroquine, sulfasalazine  She currently has no swelling, redness in her joints, however she continues to have moderate knee pain when she walks  She uses voltaren gel topically  She was recommended to take meloxcame as needed for her pain  She has psoriatic rash on her face and ears  I recommended to use triamcinolone cream for her face  She continues to have blistering rash around her knees and back  Patient was seen by dermatologist who recommended biopsy  Patient mentions numbness on her palms bilaterally  Recommended to get splint for the wrist and wear at night    Follow up in 3 month     HPI   Mj Gregory is a 61 y o   female who presents for follow-up of her psoriatic arthritis  Last clinic visit was 2/10/22  Dermatology had switched her form [facility] to [facility] for better management of her psoriatic arthritis  She received injections in [place], Oct , and next one is in January  Patient does not believe [facility] is helping her psoriasis or joint pain at all  Complains of joint pain in hands and knees, and right wrist dorsal cyst      The following portions of the patient's history were reviewed and updated as appropriate: allergies, current medications, past family history, past medical history, past social history, past surgical history and problem list     Review of Systems:   Review of Systems   Constitutional: Negative for fatigue  HENT: Negative for mouth sores  Eyes: Negative for pain  Respiratory: Negative for shortness of breath  Cardiovascular: Negative for leg swelling  Musculoskeletal: Positive for arthralgias  Negative for joint swelling  Skin: Positive for rash  Neurological: Negative for weakness  Hematological: Negative for adenopathy  Psychiatric/Behavioral: Negative for sleep disturbance  Reviewed and agree      Home Medications:    Current Outpatient Medications:   •  albuterol (2 5 mg/3 mL) 0 083 % nebulizer solution, Take 1 vial (2 5 mg total) by nebulization every 6 (six) hours as needed for wheezing, Disp: 150 mL, Rfl: 3  •  albuterol (PROVENTIL HFA,VENTOLIN HFA) 90 mcg/act inhaler, Inhale 2 puffs 4 (four) times a day As directed, Disp: 3 Inhaler, Rfl: 1  •  budesonide-formoterol (SYMBICORT) 160-4 5 mcg/act inhaler, Inhale 2 puffs 2 (two) times a day, Disp: , Rfl:   •  buPROPion (WELLBUTRIN SR) 150 mg 12 hr tablet, 2 am, 1 pm (Patient taking differently: 2 (two) times a day), Disp: 90 tablet, Rfl: 5  •  clobetasol (TEMOVATE) 0 05 % ointment, Apply topically 2 (two) times a day (Patient taking differently: Apply topically 2 (two) times a day as needed), Disp: 60 g, Rfl: 3  •  clobetasol (TEMOVATE) 0 05 % ointment, Apply topically 2 (two) times a day For 2 weeks with one week break in between use  Don't use on face, groin, axilla, Disp: 60 g, Rfl: 0  •  cycloSPORINE non-modified (SandIMMUNE) 100 mg capsule, Take 1 capsule twice a day for a week, then 2 capsules in morning and 1 capsule in evening for a week, Disp: 35 capsule, Rfl: 0  •  Diclofenac Sodium (VOLTAREN) 1 %, Apply 2 g topically 4 (four) times a day, Disp: 100 g, Rfl: 6  •  fluocinonide (LIDEX) 0 05 % external solution, Apply topically 2 (two) times a day To affected area on scalp for 14 days maximum  Can also use on the ear  Don't use on face, groin, axilla , Disp: 60 mL, Rfl: 2  •  hydrochlorothiazide (HYDRODIURIL) 25 mg tablet, TAKE 1 TABLET (25 MG TOTAL) BY MOUTH DAILY  , Disp: , Rfl:   •  ketoconazole (NIZORAL) 2 % shampoo, Daily for 2 weeks straight and then on "Mondays, Wednesdays and Fridays":  Lather into scalp and skin on face, neck, chest, and back; leave on for 5 minutes and then rinse off completely  , Disp: 120 mL, Rfl: 3  •  leflunomide (ARAVA) 20 MG tablet, Take 1 tablet (20 mg total) by mouth daily, Disp: 90 tablet, Rfl: 1  •  levalbuterol (XOPENEX HFA) 45 mcg/act inhaler, Inhale 1-2 puffs every 4 (four) hours as needed for wheezing, Disp: 1 Inhaler, Rfl: 5  •  meloxicam (Mobic) 15 mg tablet, Take 1 tablet (15 mg total) by mouth daily, Disp: 90 tablet, Rfl: 1  •  montelukast (SINGULAIR) 10 mg tablet, TAKE 1 TABLET BY MOUTH DAILY AT BEDTIME, Disp: 90 tablet, Rfl: 0  •  nitrofurantoin (MACROBID) 100 mg capsule, TAKE 1 CAPSULE BY MOUTH TWICE A DAY WITH FOOD OR MILK, Disp: , Rfl:   •  Risankizumab-rzaa (Skyrizi Pen) 150 MG/ML SOAJ, Inject 150 mg subcutaneously on week 16 then every 12 weeks there after for maintenance dose , Disp: 1 mL, Rfl: 4  •  Abatacept (Orencia ClickJect) 033 MG/ML SOAJ, Inject 125 mg SC weekly, Disp: 4 mL, Rfl: 11  •  acetaminophen (TYLENOL) 650 mg CR tablet, Take 650 mg by mouth every 8 (eight) hours as needed  , Disp: , Rfl:   •  fluticasone (FLONASE) 50 mcg/act nasal spray, 1 spray into each nostril as needed  , Disp: , Rfl:   •  hydrOXYzine HCL (ATARAX) 25 mg tablet, TAKE 1 TABLET (25 MG TOTAL) BY MOUTH EVERY 6 (SIX) HOURS AS NEEDED FOR ITCHING (Patient not taking: No sig reported), Disp: 120 tablet, Rfl: 0  •  leflunomide (ARAVA) 10 MG tablet, TAKE 1 TABLET BY MOUTH EVERY DAY (Patient not taking: No sig reported), Disp: 30 tablet, Rfl: 3  •  mupirocin (BACTROBAN) 2 % ointment, Apply twice daily to open sores (Patient not taking: No sig reported), Disp: 22 g, Rfl: 3  •  mupirocin (BACTROBAN) 2 % ointment, Apply topically 2 (two) times a day (Patient not taking: No sig reported), Disp: 22 g, Rfl: 0  •  nystatin (MYCOSTATIN) powder, Apply topically 3 (three) times a day (Patient not taking: No sig reported), Disp: 60 g, Rfl: 3  •  Risankizumab-rzaa (Skyrizi Pen) 150 MG/ML SOAJ, Inject 150 mg subcutaneously on week 0 and week 4 for loading dose  (Patient not taking: No sig reported), Disp: 2 mL, Rfl: 0  •  valsartan-hydrochlorothiazide (DIOVAN-HCT) 160-25 MG per tablet, Take 1 tablet by mouth daily (Patient not taking: Reported on 8/9/2022), Disp: 90 tablet, Rfl: 1    Objective:    Vitals:    11/03/22 1522   BP: 118/80   Weight: 90 3 kg (199 lb)   Height: 5' 1" (1 549 m)       Physical Exam  Constitutional:       General: She is not in acute distress  HENT:      Head: Normocephalic and atraumatic  Eyes:      Conjunctiva/sclera: Conjunctivae normal    Cardiovascular:      Rate and Rhythm: Normal rate and regular rhythm  Heart sounds: S1 normal and S2 normal      No friction rub  Pulmonary:      Effort: Pulmonary effort is normal  No respiratory distress  Breath sounds: Normal breath sounds  No wheezing, rhonchi or rales  Musculoskeletal:         General: Swelling and tenderness present  Cervical back: Neck supple        Comments: Right wrist dorsal cyst   Skin:     Coloration: Skin is not pale  Findings: Rash present  Comments: Diffuse small patches of psoriasis   Neurological:      Mental Status: She is alert  Mental status is at baseline  Psychiatric:         Mood and Affect: Mood normal          Behavior: Behavior normal        Reviewed labs and imaging  Imaging:  XR right knee 8/14/20  Moderate tricompartmental osteoarthritis as evidenced by joint space narrowing, osteophyte formation and subchondral sclerosis     CXR 9/19/19  No acute cardiopulmonary disease      DXA 9/16/19  RESULTS:   LUMBAR SPINE:  L1-L3,L4:  BMD 1 019 gm/cm2  T-score normal, -0 3  Z-score 0 7     LEFT TOTAL HIP:  BMD 1 076 gm/cm2  T-score above normal, +1 1  Z-score 1 8     LEFT FEMORAL NECK:  BMD 0 765 gm/cm2  T-score normal, -0 8  Z-score 0 3     IMPRESSION:  1  Based on the [facility] classification, this study is normal and the patient is considered at low risk for fracture     XR bilateral hand and feet 9/16/19  Left foot: Mild joint space narrowing of the 1st MTP joint is seen   Degenerative spurring of the midfoot is noted with accessory ossicle adjacent to the tarsal navicular   Calcaneal spurring is seen   No erosions are identified      Right foot: Degenerative changes of the 1st MTP joint are noted   There is widening of the 5th PIP joint without change which may be related to prior surgery   No erosions are identified  Fredrica Tai spurring of the midfoot is demonstrated with a sensory   ossicle adjacent to the navicular   The appearance is similar to 2016      Right hand: The intercarpal joints are unremarkable as are the MCP and PIP joints   Periarticular calcification is adjacent to the 3rd DIP joint with equivocal erosion noted      Left hand:  The intercarpal joint sensory minimal spurring at the 1st metacarpal carpal joint   The MCP and PIP joints are unremarkable   The DIP joints demonstrate minimal degenerative spurring involving the 2nd DIP   No erosions can be identified      Outside Bilateral Hand x-rays 19  Small areas suggestive of erosions, most notable right hand lunate,  along the scapholunate joint  Mild osteoarthritis of the IP joints  Right third finger DIP joint differential includes gouty arthropathy as well as arthritis      Outside Bilateral Feet x-rays 19  IMPRESSION:  Impression: Degenerative changes are demonstrated at the right and at the left foot  No erosive arthropathy is seen      Labs:   Outside Labs from [facility] -: anti-dsDNA negative, anti-SSA/SSB negative, anti-Smith negative, anti-RNP negative, anti-Scl70 Ab negative, ANCA slightly positive at 1:80, DE-3 positive at 24, normal C3, C4 elevated at 40 7, CRP <3, cryoglobulin negative    Telephone on 2022   Component Date Value Ref Range Status   • Supplier Name 2022 [employer] [facility]   Final-Edited   • Supplier Phone Number 2022 [phone]   Final-Edited   • Order Status 2022 Delivery Successful   Final-Edited   • Delivery Request Date 2022   Final-Edited   • Date Delivered  2022   Final-Edited   • Item Description 2022 Pressure Change   Final-Edited    Comment: Qty: 1  Auto CPAP Pressure Settin - 9 cm     Appointment on 2022   Component Date Value Ref Range Status   • QFT Nil 2022 0 03  0 - 8 0 IU/ml Final   • QFT TB1-NIL 2022 0 00  IU/ml Final   • QFT TB2-NIL 2022 -0 01  IU/ml Final   • QFT Mitogen-NIL 2022 7 45  IU/ml Final   • QFT Final Interpretation 2022 Negative  Negative Final    No Interferon-gamma response to M  tuberculosis antigens detected  Infection with M  tuberculosis is unlikely  A single negative result does not exclude infection with M  tuberculosis   In patients at high risk for M  tuberculosis infection, a second test should be considered in accordance with the 2017 ATS/IDSA/CDC Clinical Practice Guidelines for Diagnosis of Tuberculosis in Adults and Children  False negative results can be a result of incorrect blood sample collection or handling of the specimen affecting lymphocyte function

## 2022-11-05 ENCOUNTER — TELEPHONE (OUTPATIENT)
Dept: RHEUMATOLOGY | Facility: CLINIC | Age: 57
End: 2022-11-05

## 2022-11-05 DIAGNOSIS — L40.50 PSORIATIC ARTHRITIS (HCC): Primary | ICD-10-CM

## 2022-11-05 NOTE — TELEPHONE ENCOUNTER
Please work on prior auth for Rinvoq (upadacitinib) 15mg po daily for management of patient's uncontrolled psoriatic arthritis  She is currently on Skyrizi, and is not finding benefit on it with either her joint pain or psoriasis  Is also on leflunomide 20mg daily  Has tried and failed or was intolerant to Taltz, methotrexate, Humira, hydrochloroquine, and sulfasalazine  She has recent TB test and viral hepatitis panel on file, no active infections, and is up to date on her immunizations      Thanks,  HM

## 2022-11-10 NOTE — TELEPHONE ENCOUNTER
Addended by: Angel Solis on: 11/10/2022 03:06 PM     Modules accepted: Orders Patient was advised of above  Understanding verbalized

## 2022-11-14 ENCOUNTER — APPOINTMENT (OUTPATIENT)
Dept: LAB | Facility: MEDICAL CENTER | Age: 57
End: 2022-11-14

## 2022-11-14 DIAGNOSIS — E78.00 HYPERCHOLESTEREMIA: ICD-10-CM

## 2022-11-14 LAB
25(OH)D3 SERPL-MCNC: 23.8 NG/ML (ref 30–100)
ALBUMIN SERPL BCP-MCNC: 3.2 G/DL (ref 3.5–5)
ALP SERPL-CCNC: 80 U/L (ref 46–116)
ALT SERPL W P-5'-P-CCNC: 22 U/L (ref 12–78)
ANION GAP SERPL CALCULATED.3IONS-SCNC: 4 MMOL/L (ref 4–13)
AST SERPL W P-5'-P-CCNC: 13 U/L (ref 5–45)
BASOPHILS # BLD AUTO: 0.08 THOUSANDS/ÂΜL (ref 0–0.1)
BASOPHILS NFR BLD AUTO: 1 % (ref 0–1)
BILIRUB SERPL-MCNC: 0.39 MG/DL (ref 0.2–1)
BUN SERPL-MCNC: 16 MG/DL (ref 5–25)
CALCIUM ALBUM COR SERPL-MCNC: 9.9 MG/DL (ref 8.3–10.1)
CALCIUM SERPL-MCNC: 9.3 MG/DL (ref 8.3–10.1)
CHLORIDE SERPL-SCNC: 110 MMOL/L (ref 96–108)
CHOLEST SERPL-MCNC: 172 MG/DL
CO2 SERPL-SCNC: 27 MMOL/L (ref 21–32)
CREAT SERPL-MCNC: 0.74 MG/DL (ref 0.6–1.3)
CRP SERPL QL: 5.8 MG/L
EOSINOPHIL # BLD AUTO: 0.42 THOUSAND/ÂΜL (ref 0–0.61)
EOSINOPHIL NFR BLD AUTO: 6 % (ref 0–6)
ERYTHROCYTE [DISTWIDTH] IN BLOOD BY AUTOMATED COUNT: 15.8 % (ref 11.6–15.1)
ERYTHROCYTE [SEDIMENTATION RATE] IN BLOOD: 65 MM/HOUR (ref 0–29)
GFR SERPL CREATININE-BSD FRML MDRD: 90 ML/MIN/1.73SQ M
GLUCOSE P FAST SERPL-MCNC: 98 MG/DL (ref 65–99)
HBV CORE AB SER QL: NORMAL
HBV CORE IGM SER QL: NORMAL
HBV SURFACE AG SER QL: NORMAL
HCT VFR BLD AUTO: 41.1 % (ref 34.8–46.1)
HCV AB SER QL: NORMAL
HDLC SERPL-MCNC: 59 MG/DL
HGB BLD-MCNC: 12.4 G/DL (ref 11.5–15.4)
IMM GRANULOCYTES # BLD AUTO: 0.02 THOUSAND/UL (ref 0–0.2)
IMM GRANULOCYTES NFR BLD AUTO: 0 % (ref 0–2)
LDLC SERPL CALC-MCNC: 100 MG/DL (ref 0–100)
LYMPHOCYTES # BLD AUTO: 1.49 THOUSANDS/ÂΜL (ref 0.6–4.47)
LYMPHOCYTES NFR BLD AUTO: 20 % (ref 14–44)
MCH RBC QN AUTO: 26.5 PG (ref 26.8–34.3)
MCHC RBC AUTO-ENTMCNC: 30.2 G/DL (ref 31.4–37.4)
MCV RBC AUTO: 88 FL (ref 82–98)
MONOCYTES # BLD AUTO: 0.63 THOUSAND/ÂΜL (ref 0.17–1.22)
MONOCYTES NFR BLD AUTO: 9 % (ref 4–12)
NEUTROPHILS # BLD AUTO: 4.67 THOUSANDS/ÂΜL (ref 1.85–7.62)
NEUTS SEG NFR BLD AUTO: 64 % (ref 43–75)
NONHDLC SERPL-MCNC: 113 MG/DL
NRBC BLD AUTO-RTO: 0 /100 WBCS
PLATELET # BLD AUTO: 373 THOUSANDS/UL (ref 149–390)
PMV BLD AUTO: 10.2 FL (ref 8.9–12.7)
POTASSIUM SERPL-SCNC: 4 MMOL/L (ref 3.5–5.3)
PROT SERPL-MCNC: 7.1 G/DL (ref 6.4–8.4)
RBC # BLD AUTO: 4.68 MILLION/UL (ref 3.81–5.12)
SODIUM SERPL-SCNC: 141 MMOL/L (ref 135–147)
TRIGL SERPL-MCNC: 63 MG/DL
WBC # BLD AUTO: 7.31 THOUSAND/UL (ref 4.31–10.16)

## 2022-11-21 ENCOUNTER — TELEPHONE (OUTPATIENT)
Dept: OBGYN CLINIC | Facility: MEDICAL CENTER | Age: 57
End: 2022-11-21

## 2022-11-21 NOTE — TELEPHONE ENCOUNTER
Called patient and let her know to call [facility] back  She did not realize that they were calling her regarding Rinvoq  She will call them back to arrange delivery 
Caller: Joao Villalpando at [facility] pharmacy    Doctor: Seda Loza    Reason for call:     Joao Villalpando is calling on the script for Rinvoq 15 mg, pharmacy received the medication on 11/7/22 and the patient has not answered any of their calls to ship the medication  She is asking for a call on what to do with the medication      Call back#: Joao Villalpando number [phone], opt # 2
Stable

## 2023-01-01 NOTE — TELEPHONE ENCOUNTER
Called and spoke with pt   Pt said that sho go to [facility] to get the test done 
Patient needs to have PTT mixing study lab test      Order is in [address] [place] [address] Rd  She MUST have done at a lab at the hospital, not a [place] [address] lab  Please call ortho and let them know we will give recommendations after this test is done   She is not cleared at this time due to this abnormality
Please review and let me know how to proceed
Valdo Arnett from Dr López Jackman called asking if pt is still okay to get arthroscopy of knee on May 9, her PTT from last draw is 39  Please call back 
[place] spots

## 2023-01-04 ENCOUNTER — TELEPHONE (OUTPATIENT)
Dept: OBGYN CLINIC | Facility: HOSPITAL | Age: 58
End: 2023-01-04

## 2023-01-04 NOTE — TELEPHONE ENCOUNTER
Caller: Khushbu Helton from [address]    Doctor: Bebeto Zhong    Reason for call: Brian Taveras is not able to reach the patient and unable to leave a message on vm  Caller is asking if we could send a message through [employer] gaurav to ask the patient to call the regarding an assessment for the Rinvoq      Call back#: [phone] option #3

## 2023-01-20 ENCOUNTER — TELEPHONE (OUTPATIENT)
Dept: RHEUMATOLOGY | Facility: CLINIC | Age: 58
End: 2023-01-20

## 2023-01-24 ENCOUNTER — TELEPHONE (OUTPATIENT)
Dept: RHEUMATOLOGY | Facility: CLINIC | Age: 58
End: 2023-01-24

## 2023-01-24 NOTE — TELEPHONE ENCOUNTER
Please work on prior auth for [employer] (risankizumab) 150mg subcutaneous pen injection every 12 weeks for treatment of her uncontrolled psoriatic arthritis  Was on it in the past, and feels it was working well for her  Is currently failing Rinvoq  Is also on leflunomide 20mg daily  Has tried and failed or was intolerant to Taltz, methotrexate, Humira, hydrochloroquine, and sulfasalazine  She has recent TB test and viral hepatitis panel on file, no active infections, and is up to date on her immunizations

## 2023-01-26 ENCOUNTER — TELEPHONE (OUTPATIENT)
Dept: OBGYN CLINIC | Facility: HOSPITAL | Age: 58
End: 2023-01-26

## 2023-01-26 DIAGNOSIS — M06.00 SERONEGATIVE EROSIVE RHEUMATOID ARTHRITIS (HCC): Primary | ICD-10-CM

## 2023-01-26 DIAGNOSIS — L40.9 PSORIASIS: ICD-10-CM

## 2023-01-26 RX ORDER — RISANKIZUMAB-RZAA 150 MG/ML
INJECTION SUBCUTANEOUS
Qty: 1 ML | Refills: 4 | Status: SHIPPED | OUTPATIENT
Start: 2023-01-26

## 2023-01-26 NOTE — TELEPHONE ENCOUNTER
Caller: Patient     Doctor: Guillermina Arreaga     Reason for call: Patient was checking on status of Skyrizi auth/order   She would like it sent in to [address] [place] [address] [address] [address]   Phone:  [phone]  Fax:  [phone]     Call back#: [phone]

## 2023-02-24 DIAGNOSIS — M06.00 SERONEGATIVE EROSIVE RHEUMATOID ARTHRITIS (HCC): ICD-10-CM

## 2023-02-24 RX ORDER — LEFLUNOMIDE 10 MG/1
TABLET ORAL
Qty: 30 TABLET | Refills: 0 | Status: SHIPPED | OUTPATIENT
Start: 2023-02-24

## 2023-03-23 ENCOUNTER — OFFICE VISIT (OUTPATIENT)
Dept: DERMATOLOGY | Facility: CLINIC | Age: 58
End: 2023-03-23

## 2023-03-23 VITALS — WEIGHT: 209 LBS | BODY MASS INDEX: 39.46 KG/M2 | HEIGHT: 61 IN

## 2023-03-23 DIAGNOSIS — T14.8XXA EXCORIATION: ICD-10-CM

## 2023-03-23 DIAGNOSIS — L25.9 CONTACT DERMATITIS, UNSPECIFIED CONTACT DERMATITIS TYPE, UNSPECIFIED TRIGGER: ICD-10-CM

## 2023-03-23 DIAGNOSIS — L40.9 PSORIASIS: Primary | ICD-10-CM

## 2023-03-23 RX ORDER — BETAMETHASONE DIPROPIONATE 0.05 %
GEL (GRAM) TOPICAL 2 TIMES DAILY
Qty: 50 G | Refills: 0 | Status: SHIPPED | OUTPATIENT
Start: 2023-03-23

## 2023-03-23 NOTE — PROGRESS NOTES
[facility] Note     Patient Name: Violeta Muir  Encounter Date: 3/23/2023    Have you been cared for by a [place] [id number] Dermatologist in the last 3 years and, if so, which description applies to you? Yes  I have been here within the last 3 years, and my medical history has NOT changed since that time  I am FEMALE/of child-bearing potential     REVIEW OF SYSTEMS:  Have you recently had or currently have any of the following? · No changes in my recent health  PAST MEDICAL HISTORY:  Have you personally ever had or currently have any of the following? If "YES," then please provide more detail  · No changes in my medical history  FAMILY HISTORY:  Any "first degree relatives" (parent, brother, sister, or child) with the following? • No changes in my family's known health  PATIENT EXPERIENCE:    • Do you want the Dermatologist to perform a COMPLETE skin exam today including a clinical examination under the "bra and underwear" areas? NO  • If necessary, do we have your permission to call and leave a detailed message on your Preferred Phone number that includes your specific medical information?   Yes      Allergies   Allergen Reactions   • Other Rash     Adhesive tape and adhesive bandaids   • Sulfasalazine Rash   • Taltz [Ixekizumab] Itching, Swelling and Rash   • Wound Dressing Adhesive Itching and Rash   • Humira [Adalimumab] Itching and Rash      Current Outpatient Medications:   •  acetaminophen (TYLENOL) 650 mg CR tablet, Take 650 mg by mouth every 8 (eight) hours as needed  , Disp: , Rfl:   •  albuterol (2 5 mg/3 mL) 0 083 % nebulizer solution, Take 1 vial (2 5 mg total) by nebulization every 6 (six) hours as needed for wheezing, Disp: 150 mL, Rfl: 3  •  albuterol (PROVENTIL HFA,VENTOLIN HFA) 90 mcg/act inhaler, Inhale 2 puffs 4 (four) times a day As directed, Disp: 3 Inhaler, Rfl: 1  •  budesonide-formoterol (SYMBICORT) 160-4 5 mcg/act inhaler, Inhale 2 puffs 2 (two) times a day, Disp: , Rfl:   •  buPROPion (WELLBUTRIN SR) 150 mg 12 hr tablet, 2 am, 1 pm (Patient taking differently: 2 (two) times a day), Disp: 90 tablet, Rfl: 5  •  clobetasol (TEMOVATE) 0 05 % ointment, Apply topically 2 (two) times a day (Patient taking differently: Apply topically 2 (two) times a day as needed), Disp: 60 g, Rfl: 3  •  clobetasol (TEMOVATE) 0 05 % ointment, Apply topically 2 (two) times a day For 2 weeks with one week break in between use  Don't use on face, groin, axilla, Disp: 60 g, Rfl: 0  •  cycloSPORINE non-modified (SandIMMUNE) 100 mg capsule, Take 1 capsule twice a day for a week, then 2 capsules in morning and 1 capsule in evening for a week, Disp: 35 capsule, Rfl: 0  •  fluocinonide (LIDEX) 0 05 % external solution, Apply topically 2 (two) times a day To affected area on scalp for 14 days maximum  Can also use on the ear  Don't use on face, groin, axilla , Disp: 60 mL, Rfl: 2  •  fluticasone (FLONASE) 50 mcg/act nasal spray, 1 spray into each nostril as needed  , Disp: , Rfl:   •  hydrochlorothiazide (HYDRODIURIL) 25 mg tablet, TAKE 1 TABLET (25 MG TOTAL) BY MOUTH DAILY  , Disp: , Rfl:   •  hydrOXYzine HCL (ATARAX) 25 mg tablet, TAKE 1 TABLET (25 MG TOTAL) BY MOUTH EVERY 6 (SIX) HOURS AS NEEDED FOR ITCHING (Patient not taking: No sig reported), Disp: 120 tablet, Rfl: 0  •  ketoconazole (NIZORAL) 2 % shampoo, Daily for 2 weeks straight and then on "Mondays, Wednesdays and Fridays":  Lather into scalp and skin on face, neck, chest, and back; leave on for 5 minutes and then rinse off completely  , Disp: 120 mL, Rfl: 3  •  leflunomide (ARAVA) 10 MG tablet, TAKE 1 TABLET BY MOUTH EVERY DAY, Disp: 30 tablet, Rfl: 0  •  leflunomide (ARAVA) 20 MG tablet, Take 1 tablet (20 mg total) by mouth daily, Disp: 90 tablet, Rfl: 1  •  levalbuterol (XOPENEX HFA) 45 mcg/act inhaler, Inhale 1-2 puffs every 4 (four) hours as needed for wheezing, Disp: 1 Inhaler, Rfl: 5  •  meloxicam (MOBIC) 15 mg tablet, TAKE 1 TABLET (15 MG TOTAL) BY MOUTH DAILY  , Disp: 90 tablet, Rfl: 1  •  montelukast (SINGULAIR) 10 mg tablet, TAKE 1 TABLET BY MOUTH DAILY AT BEDTIME, Disp: 90 tablet, Rfl: 0  •  mupirocin (BACTROBAN) 2 % ointment, Apply twice daily to open sores (Patient not taking: No sig reported), Disp: 22 g, Rfl: 3  •  mupirocin (BACTROBAN) 2 % ointment, Apply topically 2 (two) times a day (Patient not taking: No sig reported), Disp: 22 g, Rfl: 0  •  nitrofurantoin (MACROBID) 100 mg capsule, TAKE 1 CAPSULE BY MOUTH TWICE A DAY WITH FOOD OR MILK, Disp: , Rfl:   •  nystatin (MYCOSTATIN) powder, Apply topically 3 (three) times a day (Patient not taking: No sig reported), Disp: 60 g, Rfl: 3  •  Risankizumab-rzaa (Skyrizi Pen) 150 MG/ML SOAJ, Inject 150 mg subcutaneously on week 0 and week 4 for loading dose  (Patient not taking: No sig reported), Disp: 2 mL, Rfl: 0  •  Risankizumab-rzaa (Skyrizi Pen) 150 MG/ML SOAJ, Inject 150 mg subcutaneously on week 16 then every 12 weeks there after for maintenance dose , Disp: 1 mL, Rfl: 4  •  Upadacitinib ER 15 MG TB24, Take 15 mg by mouth in the morning, Disp: 30 tablet, Rfl: 11  •  valsartan-hydrochlorothiazide (DIOVAN-HCT) 160-25 MG per tablet, Take 1 tablet by mouth daily (Patient not taking: Reported on 8/9/2022), Disp: 90 tablet, Rfl: 1          • Whom besides the patient is providing clinical information about today's encounter?   o NO ADDITIONAL HISTORIAN (patient alone provided history)    Physical Exam and Assessment/Plan by Diagnosis:    PSORIASIS FOLLOW UP   PSORIATIC ARTHRITIS     Physical Exam:  • Anatomic Location Affected:  Joint pain ( knees and hands)  • Morphological Description:    • Pertinent Positives:  • Pertinent Negatives:     Additional History of Present Condition:      Assessment and Plan:  Based on a thorough discussion of this condition and the management approach to it (including a comprehensive discussion of the known risks, side effects and potential benefits of treatment), the patient (family) agrees to implement the following specific plan:  •       PSORIASIS    Physical Exam:  • Anatomic Location Affected:  Scalp, trunk, extremities   • Morphological Description:    • Pertinent Positives:  • Pertinent Negatives: Additional History of Present Condition:      Assessment and Plan:  Based on a thorough discussion of this condition and the management approach to it (including a comprehensive discussion of the known risks, side effects and potential benefits of treatment), the patient (family) agrees to implement the following specific plan:  •           RASH C/W CONTACT DERMATITIS  Physical Exam:  • Anatomic Location Affected:  Right wrist   • Morphological Description:    • Pertinent Positives:  • Pertinent Negatives:  •        Additional History of Present Condition:  Patient presents to the office with complains of an serous draining, itchy rash on right wrist dorsal aspect - she explained this rash started for about 4 weeks  She was prescribed cephalexin 500 mg four times for 7 days, then she was prescribed Doxycyline 100 mg twice a day for 10 days  Assessment and Plan:  Based on a thorough discussion of this condition and the management approach to it (including a comprehensive discussion of the known risks, side effects and potential benefits of treatment), the patient (family) agrees to implement the following specific plan:  • Start antiinflammatory   • T/C prednisone, but patient is on [facility]

## 2023-04-05 ENCOUNTER — TELEPHONE (OUTPATIENT)
Dept: DERMATOLOGY | Facility: CLINIC | Age: 58
End: 2023-04-05

## 2023-04-05 NOTE — TELEPHONE ENCOUNTER
Patient checked in at 3:30 pm for a 4:20 appointment  Patient was brought back and roomed by 4:30 and advised that provider was running a few minutes behind but that we would be in as soon as possible  At 4:50 pm patient came out of the room and said she was leaving that she isn't going to wait  I advised patient that the provider was still in with patient before her but that we would be with her as soon as possible  Patient still left without being seen

## 2023-05-07 DIAGNOSIS — M06.00 SERONEGATIVE EROSIVE RHEUMATOID ARTHRITIS (HCC): ICD-10-CM

## 2023-05-08 RX ORDER — LEFLUNOMIDE 20 MG/1
TABLET ORAL
Qty: 30 TABLET | Refills: 5 | Status: SHIPPED | OUTPATIENT
Start: 2023-05-08

## 2023-09-27 ENCOUNTER — TELEPHONE (OUTPATIENT)
Age: 58
End: 2023-09-27

## 2023-09-27 NOTE — TELEPHONE ENCOUNTER
Caller: Melissa Gandhi w/ [facility]     Doctor: Júnior Apodaca     Reason for call: Maddison Serrano     in need of a prior auth .      Call back#: [phone]

## 2023-10-02 ENCOUNTER — TELEPHONE (OUTPATIENT)
Dept: RHEUMATOLOGY | Facility: CLINIC | Age: 58
End: 2023-10-02

## 2023-10-05 NOTE — TELEPHONE ENCOUNTER
Caller: [facility]    Doctor: Michelle Parkinson    Reason for call: Helio Butler was denied.  She will fax a copy of the denial letter     Call back#:

## 2023-10-16 ENCOUNTER — TELEPHONE (OUTPATIENT)
Dept: RHEUMATOLOGY | Facility: CLINIC | Age: 58
End: 2023-10-16

## 2023-10-16 ENCOUNTER — OFFICE VISIT (OUTPATIENT)
Dept: RHEUMATOLOGY | Facility: CLINIC | Age: 58
End: 2023-10-16
Payer: COMMERCIAL

## 2023-10-16 ENCOUNTER — APPOINTMENT (OUTPATIENT)
Dept: LAB | Facility: CLINIC | Age: 58
End: 2023-10-16
Payer: COMMERCIAL

## 2023-10-16 VITALS
DIASTOLIC BLOOD PRESSURE: 68 MMHG | WEIGHT: 202.2 LBS | OXYGEN SATURATION: 98 % | SYSTOLIC BLOOD PRESSURE: 116 MMHG | HEIGHT: 62 IN | BODY MASS INDEX: 37.21 KG/M2 | HEART RATE: 75 BPM

## 2023-10-16 DIAGNOSIS — L40.50 PSORIATIC ARTHRITIS (HCC): Primary | ICD-10-CM

## 2023-10-16 DIAGNOSIS — M17.0 PRIMARY OSTEOARTHRITIS OF BOTH KNEES: ICD-10-CM

## 2023-10-16 DIAGNOSIS — Z79.899 HIGH RISK MEDICATION USE: ICD-10-CM

## 2023-10-16 DIAGNOSIS — M06.00 SERONEGATIVE EROSIVE RHEUMATOID ARTHRITIS (HCC): ICD-10-CM

## 2023-10-16 LAB
ALBUMIN SERPL BCP-MCNC: 4.1 G/DL (ref 3.5–5)
ALP SERPL-CCNC: 64 U/L (ref 34–104)
ALT SERPL W P-5'-P-CCNC: 13 U/L (ref 7–52)
ANION GAP SERPL CALCULATED.3IONS-SCNC: 8 MMOL/L
AST SERPL W P-5'-P-CCNC: 15 U/L (ref 13–39)
BASOPHILS # BLD AUTO: 0.06 THOUSANDS/ÂΜL (ref 0–0.1)
BASOPHILS NFR BLD AUTO: 1 % (ref 0–1)
BILIRUB SERPL-MCNC: 0.43 MG/DL (ref 0.2–1)
BUN SERPL-MCNC: 12 MG/DL (ref 5–25)
CALCIUM SERPL-MCNC: 9.3 MG/DL (ref 8.4–10.2)
CHLORIDE SERPL-SCNC: 104 MMOL/L (ref 96–108)
CO2 SERPL-SCNC: 29 MMOL/L (ref 21–32)
CREAT SERPL-MCNC: 0.72 MG/DL (ref 0.6–1.3)
CRP SERPL QL: 7.2 MG/L
EOSINOPHIL # BLD AUTO: 0.15 THOUSAND/ÂΜL (ref 0–0.61)
EOSINOPHIL NFR BLD AUTO: 2 % (ref 0–6)
ERYTHROCYTE [DISTWIDTH] IN BLOOD BY AUTOMATED COUNT: 13.6 % (ref 11.6–15.1)
ERYTHROCYTE [SEDIMENTATION RATE] IN BLOOD: 56 MM/HOUR (ref 0–29)
GFR SERPL CREATININE-BSD FRML MDRD: 92 ML/MIN/1.73SQ M
GLUCOSE P FAST SERPL-MCNC: 75 MG/DL (ref 65–99)
HBV CORE AB SER QL: NORMAL
HBV CORE IGM SER QL: NORMAL
HBV SURFACE AG SER QL: NORMAL
HCT VFR BLD AUTO: 44.3 % (ref 34.8–46.1)
HCV AB SER QL: NORMAL
HGB BLD-MCNC: 13.7 G/DL (ref 11.5–15.4)
IMM GRANULOCYTES # BLD AUTO: 0.02 THOUSAND/UL (ref 0–0.2)
IMM GRANULOCYTES NFR BLD AUTO: 0 % (ref 0–2)
LYMPHOCYTES # BLD AUTO: 1.63 THOUSANDS/ÂΜL (ref 0.6–4.47)
LYMPHOCYTES NFR BLD AUTO: 23 % (ref 14–44)
MCH RBC QN AUTO: 27.4 PG (ref 26.8–34.3)
MCHC RBC AUTO-ENTMCNC: 30.9 G/DL (ref 31.4–37.4)
MCV RBC AUTO: 89 FL (ref 82–98)
MONOCYTES # BLD AUTO: 0.6 THOUSAND/ÂΜL (ref 0.17–1.22)
MONOCYTES NFR BLD AUTO: 8 % (ref 4–12)
NEUTROPHILS # BLD AUTO: 4.77 THOUSANDS/ÂΜL (ref 1.85–7.62)
NEUTS SEG NFR BLD AUTO: 66 % (ref 43–75)
NRBC BLD AUTO-RTO: 0 /100 WBCS
PLATELET # BLD AUTO: 342 THOUSANDS/UL (ref 149–390)
PMV BLD AUTO: 10.9 FL (ref 8.9–12.7)
POTASSIUM SERPL-SCNC: 4.2 MMOL/L (ref 3.5–5.3)
PROT SERPL-MCNC: 7 G/DL (ref 6.4–8.4)
RBC # BLD AUTO: 5 MILLION/UL (ref 3.81–5.12)
SODIUM SERPL-SCNC: 141 MMOL/L (ref 135–147)
WBC # BLD AUTO: 7.23 THOUSAND/UL (ref 4.31–10.16)

## 2023-10-16 PROCEDURE — 86803 HEPATITIS C AB TEST: CPT | Performed by: INTERNAL MEDICINE

## 2023-10-16 PROCEDURE — 36415 COLL VENOUS BLD VENIPUNCTURE: CPT | Performed by: INTERNAL MEDICINE

## 2023-10-16 PROCEDURE — 99214 OFFICE O/P EST MOD 30 MIN: CPT | Performed by: INTERNAL MEDICINE

## 2023-10-16 PROCEDURE — 86480 TB TEST CELL IMMUN MEASURE: CPT | Performed by: INTERNAL MEDICINE

## 2023-10-16 PROCEDURE — 86140 C-REACTIVE PROTEIN: CPT | Performed by: INTERNAL MEDICINE

## 2023-10-16 PROCEDURE — 86704 HEP B CORE ANTIBODY TOTAL: CPT | Performed by: INTERNAL MEDICINE

## 2023-10-16 PROCEDURE — 85652 RBC SED RATE AUTOMATED: CPT | Performed by: INTERNAL MEDICINE

## 2023-10-16 PROCEDURE — 87340 HEPATITIS B SURFACE AG IA: CPT | Performed by: INTERNAL MEDICINE

## 2023-10-16 PROCEDURE — 80053 COMPREHEN METABOLIC PANEL: CPT | Performed by: INTERNAL MEDICINE

## 2023-10-16 PROCEDURE — 86705 HEP B CORE ANTIBODY IGM: CPT | Performed by: INTERNAL MEDICINE

## 2023-10-16 PROCEDURE — 85025 COMPLETE CBC W/AUTO DIFF WBC: CPT | Performed by: INTERNAL MEDICINE

## 2023-10-16 RX ORDER — MELOXICAM 15 MG/1
15 TABLET ORAL DAILY
Qty: 90 TABLET | Refills: 1 | Status: SHIPPED | OUTPATIENT
Start: 2023-10-16

## 2023-10-16 RX ORDER — LEFLUNOMIDE 20 MG/1
20 TABLET ORAL DAILY
Qty: 90 TABLET | Refills: 1 | Status: SHIPPED | OUTPATIENT
Start: 2023-10-16

## 2023-10-16 NOTE — PROGRESS NOTES
Assessment and Plan: Jadyn Koenig is a 61 y.o. female who presents for follow-up of her psoriatic arthritis. Rinvoq was not effective, and Daisy Yang is not lasting the full 12-weeks in controlling her psoriasis. She would be a good candidate for Tremfya injections that has a similar mechanism of action as Skyrizi, IL-23 inhibitor, but has a maintenance dose that is every 8 weeks. Double daily Vit. D intake  Continue leflunomide 20mg daily  Continue meloxicam daily as needed for joint pain  Continue diclofenac gel as needed for joint pain  Will switch from Cherene Trey to Tremfya injections at weeks 0, 4, then every 8 weeks  Do labs today    Return to clinic in 6 months     Plan:  Diagnoses and all orders for this visit:    Psoriatic arthritis ([address] )  -     CBC and differential  -     Comprehensive metabolic panel  -     C-reactive protein  -     Sedimentation rate, automated    Seronegative erosive rheumatoid arthritis ([address])  -     leflunomide (ARAVA) 20 MG tablet; Take 1 tablet (20 mg total) by mouth daily    Primary osteoarthritis of both knees  -     meloxicam (MOBIC) 15 mg tablet; Take 1 tablet (15 mg total) by mouth daily  -     Diclofenac Sodium (VOLTAREN) 1 %; Apply 2 g topically 4 (four) times a day    High risk medication use  -     Chronic Hepatitis Panel  -     Quantiferon TB Gold Plus Assay    High risk medication use - Benefits and risks of leflunomide use, including but not limited to gastrointestinal disturbances such as nausea, diarrhea, stomatitis, hair loss, fatigue, leukopenia, and hepatotoxicity were discussed with the patient. CBC, CMP will be monitored regularly. Benefits and risks of Tremfya discussed, and include but are not limited to leukopenia, reactivation of hepatitis B/C or TB, infusion or site reactions, and increased risk of infections. A baseline CBC, CMP, Hepatitis B/C status, and TB test will be checked. CBC, CMP will be regularly monitored.      Follow-up plan: Return to clinic in 6 months           Rheumatic Disease Summary:  Marina Davis is a 61 y.o. female who originally presented on 9/16/19 as a Rheumatology consult referred by her PCP Robbie Lowery MD for evaluation and management of her seronegative Rheumatoid arthritis. Patient had a DAS28 score of 3.32, consistent with moderate disease activity. She was having active inflammatory arthritis despite being on prednisone 10mg po daily, which she cannot stay on long-term without being tapered. Patient had already been tried on methotrexate and sulfasalazine DMARD therapy, both of which caused patient to develop a rash. It was deemed appropriate to pursue treatment with a biologic medication such as Humira to better control her disease, though this took several weeks to get approved. Vit. D level was low, which was being supplemented. DEXA scan returned normal. Prescribed diclofenac gel to apply to painful knees and shoulders as needed for both her RA and OA. Ordered bilateral hand and feet x-rays to obtain patient's latest baseline, which revealed an erosion at the 3rd DIP joint on the right hand, which is an atypical location for RA. Since patient had a history of miscarriage, ordered an antiphospholipid panel, which returned unremarkable. She presented for follow-up on 10/29/19, at which time she had not yet started on Humira, and her RA symptoms were overall stable. She was encouraged to go ahead and start Humira while starting to taper her prednisone by 1mg every 2 weeks. Also had started patient on the DMARD hydroxychloroquine 200mg po bid to work in conjunction with Humira to control her RA symptoms. She was to continue her ergocalciferol 50,000 units po weekly for her Vit. D deficiency until her Vit. D level was repeated. She was to continue to apply diclofenac gel as needed for her knee osteoarthritis-related pain.       She then presented for follow-up on 12/10/19 as an urgent clinic visit after being managed at the [facility] ED for an extensive rash that she developed as an allergic reaction to Humira, which had significantly improved upon the medication's discontinuation and increased prednisone course. 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 Besides rash, which had resolved, patient had no signs or symptoms of a vasculitis, such as kidney or lung involvement. Had decided to hold off initiating any further DMARD or biologic therapy for patient's seronegative RA. Asked patient to increase her prednisone to 30mg po daily for a week, then decrease to 20mg po daily for a week, then 15mg po daily for a week, then 10mg po daily for a week, then go down by 1mg every two weeks as tolerated. She was to continue ergocalciferol 50,000 units po weekly for her Vit. D deficiency. 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 She next presented for follow-up on 5/18/20 via telemedicine for her seronegative Rheumatoid arthritis. She had a rash to methotrexate, sulfasalazine, and Humira so far. Was considering starting patient on leflunomide 10mg po daily for DMARD therapy, so that her prednisone could be tapered down in the future. Ordered baseline CBC, CMP prior to starting per patient's request, and ESR, CRP for disease activity monitoring. She was to continue prednisone 10mg po daily for time-being, and diclofenac gel as needed for joint pain. 8/6/20: Her right shoulder has been bothering her as well as her right knee, in which she has OA.  She can restart taking HCQ 200mg po bid, since she didn't actually have a reaction to it in the past, rather it was Humira that she developed a severe rash to. She can use diclofenac gel or ibuprofen for knee pain as needed (asked her to not use them at the same time). She is to continue 2,000 units a day of Vit. D for low Vit. D. Also, she is to contact Orthopedics regarding possible knee replacement. 6/10/21: With patient's recently active psoriasis, it is now apparent that she actually has psoriatic arthritis rather than seronegative RA, especially since her x-rays reveal inflammatory joint changes at her DIPs, which is much more common in psoriatic arthritis than RA. Patient would be a good candidate for Taltz to help both her extensive psoriasis as well as arthritis symptoms; it would be 160mg once followed by 80mg every 4 weeks. She had a severe rash allergic reaction to Humira in the past, so is hesitant to try any other TNF inhibitors at this time. Has had allergic reactions of rash to methotrexate sulfasalazine in the past. Restarted leflunomide 10mg po daily while awaiting Taltz approval; do not believe leflunomide alone can help resolved patient's extensive skin psoriasis. Can use clobetasol ointment or powder as needed for psoriasis  Take 2,000 units a day of Vit. D over the counter  Can take diclofenac and diclofenac gel as needed for joint pain  Orthopedics referral made for patient to re-establish with Dr. William Garcia; is s/p bilateral knee replacement at [facility], but patient wants to follow-up with [facility]     8/2/21: The honey crusted lesions on her knees seem consistent with impetigo; asked patient to finish doxycycline course twice a day. Recommend that she follow up with Dermatology as soon as possible. Also recommended that she finish 14 days ibuprofen 3 times a day for possible impetigo on her knees. Asked patient to hold the OpenDNS until the coast and some areas on her her knees completely heal. If crusted areas don't heal, patient is to let me know and I can prescribe another antibiotic such as Bactrim.  Continue leflunomide 10mg po daily. Provided wound care for her knees lesions. 11/2/21: Patient was not having complete improvement/resolution of symptoms on Taltz, despite being on it for several months; it temporarily helped her rash but had not helped her joint pain. She wanted like to try Orencia weekly injections as an alternative. She had already tried and failed or had adverse effects to methotrexate, Humira, hydroxychloroquine, sulfasalazine, and now [facility]. She also had been successfully tapered off prednisone, and did not wish to restart it. Increased her leflunomide dose to 20 mg p.o. daily; however, patient needed the addition of a biologic for better control of her disease. 2/10/22: Patient was started on Orensia for  about 3 month ago. Her leflunomide dose was increased last visit. Patient tolerates medications well now. She was on Taltz before that did not keep her symptoms under control. She previously had side effects on methotrexate, humira, hydrochloroquine, sulfasalazine. She currently has no swelling, redness in her joints, however she continues to have moderate knee pain when she walks. She uses voltaren gel topically. She was recommended to take meloxcame as needed for her pain. She has psoriatic rash on her face and ears. I recommended to use triamcinolone cream for her face. She continues to have blistering rash around her knees and back. Patient was seen by dermatologist who recommended biopsy. Patient mentions numbness on her palms bilaterally. Recommended to get splint for the wrist and wear at night. Follow up in 3 month    11/3/22: Patient presents for follow-up of her psoriatic arthritis (is more likely than seronegative Rheumatoid arthritis). patient states neither her psoriasis or joint pain is controlled on Skyrizi; would like to switch to another biologic. Would be a good candidate for Rinvoq. Cyclosporine prescribed to help with her current psoriasis flare.   Continue leflunomide 20mg daily  Take cyclosporine 1 capsule twice a day for a week, then 2 capsule in the morning and 1 capsule in the evening  Can take meloxicam daily as needed for joint pain  Continue diclofenac gel as needed for joint pain  Will work on prior auth for Rinvoq 15mg daily  Do labs       HPI   Hang Romo is a 61 y.o.  female who presents for follow-up of her psoriatic arthritis. Last clinic visit was 11/3/22. Since then, Rinvoq was not helpful so patient was switched back to Picoto every 12-week injections, which was more helpful for her psoriasis, but patient admits that her psoriasis flareups keep coming back before she is due for her next injection; injections do not last the full 12 weeks, more like 8 weeks. She has been on Picoto for 9 months now. She currently has a rash on her right ear and forearms    The following portions of the patient's history were reviewed and updated as appropriate: allergies, current medications, past family history, past medical history, past social history, past surgical history and problem list.    Review of Systems:   Review of Systems   Constitutional:  Negative for fatigue. HENT:  Negative for mouth sores. Eyes:  Negative for pain. Respiratory:  Negative for shortness of breath. Cardiovascular:  Negative for leg swelling. Gastrointestinal:  Positive for nausea and vomiting. Musculoskeletal:  Positive for arthralgias and joint swelling. Skin:  Positive for rash. Neurological:  Negative for weakness. Hematological:  Negative for adenopathy. Psychiatric/Behavioral:  Negative for sleep disturbance. Answers submitted by the patient for this visit:  Rash Questionnaire (Submitted on 10/11/2023)  Chief Complaint: Rash  Chronicity: recurrent  Onset: more than 1 year ago  Progression since onset: unchanged  Characteristics: itchiness  Exposed to: nothing  anorexia: Yes    Reviewed and agree.     Home Medications:    Current Outpatient Medications:     albuterol (2.5 mg/3 mL) 0.083 % nebulizer solution, Take 1 vial (2.5 mg total) by nebulization every 6 (six) hours as needed for wheezing, Disp: 150 mL, Rfl: 3    buPROPion (WELLBUTRIN SR) 150 mg 12 hr tablet, 2 am, 1 pm (Patient taking differently: 2 (two) times a day), Disp: 90 tablet, Rfl: 5    clobetasol (TEMOVATE) 0.05 % ointment, , Disp: , Rfl:     coal tar (LCD) 20 %, Rub small amount topically into rash tid. For topical use only, Disp: 473 mL, Rfl: 0    Diclofenac Sodium (VOLTAREN) 1 %, Apply 2 g topically 4 (four) times a day, Disp: 100 g, Rfl: 6    fluticasone (FLONASE) 50 mcg/act nasal spray, 1 spray into each nostril as needed  , Disp: , Rfl:     hydrochlorothiazide (HYDRODIURIL) 25 mg tablet, TAKE 1 TABLET (25 MG TOTAL) BY MOUTH DAILY. , Disp: , Rfl:     hydrOXYzine HCL (ATARAX) 25 mg tablet, TAKE 1 TABLET (25 MG TOTAL) BY MOUTH EVERY 6 (SIX) HOURS AS NEEDED FOR ITCHING, Disp: 120 tablet, Rfl: 0    leflunomide (ARAVA) 20 MG tablet, Take 1 tablet (20 mg total) by mouth daily, Disp: 90 tablet, Rfl: 1    meloxicam (MOBIC) 15 mg tablet, Take 1 tablet (15 mg total) by mouth daily, Disp: 90 tablet, Rfl: 1    montelukast (SINGULAIR) 10 mg tablet, TAKE 1 TABLET BY MOUTH DAILY AT BEDTIME, Disp: 90 tablet, Rfl: 0    Risankizumab-rzaa (Skyrizi Pen) 150 MG/ML SOAJ, Inject 150 mg subcutaneously on week 16 then every 12 weeks there after for maintenance dose., Disp: 1 mL, Rfl: 4    albuterol (PROVENTIL HFA,VENTOLIN HFA) 90 mcg/act inhaler, Inhale 2 puffs 4 (four) times a day As directed, Disp: 3 Inhaler, Rfl: 1    fluticasone-umeclidinium-vilanterol (Trelegy Ellipta) 200-62.5-25 mcg/actuation AEPB inhaler, Inhale 1 puff daily Rinse mouth after use., Disp: 60 each, Rfl: 0    levalbuterol (XOPENEX HFA) 45 mcg/act inhaler, Inhale 1-2 puffs every 4 (four) hours as needed for wheezing (Patient not taking: Reported on 10/16/2023), Disp: 1 Inhaler, Rfl: 5    mupirocin (BACTROBAN) 2 % ointment, Apply topically 2 (two) times a day (Patient not taking: Reported on 8/9/2022), Disp: 22 g, Rfl: 0    nystatin (MYCOSTATIN) powder, Apply topically 3 (three) times a day (Patient not taking: Reported on 10/16/2023), Disp: 60 g, Rfl: 3    Risankizumab-rzaa (Skyrizi Pen) 150 MG/ML SOAJ, Inject 150 mg subcutaneously on week 0 and week 4 for loading dose. (Patient not taking: Reported on 10/16/2023), Disp: 2 mL, Rfl: 0    Objective:    Vitals:    10/16/23 1058   BP: 116/68   Pulse: 75   SpO2: 98%   Weight: 91.7 kg (202 lb 3.2 oz)   Height: 5' 2" (1.575 m)       Physical Exam  Constitutional:       General: She is not in acute distress. HENT:      Head: Normocephalic and atraumatic. Eyes:      Conjunctiva/sclera: Conjunctivae normal.   Cardiovascular:      Rate and Rhythm: Normal rate and regular rhythm. Heart sounds: S1 normal and S2 normal.      No friction rub. Pulmonary:      Effort: Pulmonary effort is normal. No respiratory distress. Breath sounds: Normal breath sounds. No wheezing, rhonchi or rales. Musculoskeletal:         General: Tenderness present. Cervical back: Neck supple. Skin:     Coloration: Skin is not pale. Findings: Rash present. Comments: psoriasis on right ear and both forearms   Neurological:      Mental Status: She is alert. Mental status is at baseline. Psychiatric:         Mood and Affect: Mood normal.         Behavior: Behavior normal.       Reviewed labs and imaging. Imaging:  XR right knee 8/14/20  Moderate tricompartmental osteoarthritis as evidenced by joint space narrowing, osteophyte formation and subchondral sclerosis     CXR 9/19/19  No acute cardiopulmonary disease. DXA 9/16/19  RESULTS:   LUMBAR SPINE:  L1-L3,L4:  BMD 1.019 gm/cm2  T-score normal, -0.3  Z-score 0.7     LEFT TOTAL HIP:  BMD 1.076 gm/cm2  T-score above normal, +1.1  Z-score 1.8     LEFT FEMORAL NECK:  BMD 0.765 gm/cm2  T-score normal, -0.8  Z-score 0.3     IMPRESSION:  1.  Based on the [facility] classification, this study is normal and the patient is considered at low risk for fracture     XR bilateral hand and feet 9/16/19  Left foot: Mild joint space narrowing of the 1st MTP joint is seen. Degenerative spurring of the midfoot is noted with accessory ossicle adjacent to the tarsal navicular. Calcaneal spurring is seen. No erosions are identified. Right foot: Degenerative changes of the 1st MTP joint are noted. There is widening of the 5th PIP joint without change which may be related to prior surgery. No erosions are identified. Mild spurring of the midfoot is demonstrated with a sensory   ossicle adjacent to the navicular. The appearance is similar to 2016. Right hand: The intercarpal joints are unremarkable as are the MCP and PIP joints. Periarticular calcification is adjacent to the 3rd DIP joint with equivocal erosion noted. Left hand: The intercarpal joint sensory minimal spurring at the 1st metacarpal carpal joint. The MCP and PIP joints are unremarkable. The DIP joints demonstrate minimal degenerative spurring involving the 2nd DIP. No erosions can be identified. Outside Bilateral Hand x-rays 11/29/19  Small areas suggestive of erosions, most notable right hand lunate,  along the scapholunate joint. Mild osteoarthritis of the IP joints. Right third finger DIP joint differential includes gouty arthropathy as well as arthritis. Outside Bilateral Feet x-rays 11/29/19  IMPRESSION:  Impression: Degenerative changes are demonstrated at the right and at the left foot. No erosive arthropathy is seen. Labs:   Outside Labs from [facility] 1/27-28/19: anti-dsDNA negative, anti-SSA/SSB negative, anti-Smith negative, anti-RNP negative, anti-Scl70 Ab negative, ANCA slightly positive at 1:80, GA-3 positive at 24, normal C3, C4 elevated at 40.7, CRP <3, cryoglobulin negative    No visits with results within 6 Month(s) from this visit.    Latest known visit with results is:   Appointment on 11/14/2022 Component Date Value Ref Range Status    Cholesterol 11/14/2022 172  See Comment mg/dL Final    Cholesterol:         Pediatric <18 Years        Desirable          <170 mg/dL      Borderline High    170-199 mg/dL      High               >=200 mg/dL        Adult >=18 Years            Desirable         <200 mg/dL      Borderline High   200-239 mg/dL      High              >239 mg/dL      Triglycerides 11/14/2022 63  See Comment mg/dL Final    Triglyceride:     0-9Y            <75mg/dL     10Y-17Y         <90 mg/dL       >=18Y     Normal          <150 mg/dL     Borderline High 150-199 mg/dL     High            200-499 mg/dL        Very High       >499 mg/dL    Specimen collection should occur prior to N-Acetylcysteine or Metamizole administration due to the potential for falsely depressed results. HDL, Direct 11/14/2022 59  >=50 mg/dL Final    Specimen collection should occur prior to Metamizole administration due to the potential for falsley depressed results. LDL Calculated 11/14/2022 100  0 - 100 mg/dL Final    LDL Cholesterol:     Optimal           <100 mg/dl     Near Optimal      100-129 mg/dl     Above Optimal       Borderline High 130-159 mg/dl       High            160-189 mg/dl       Very High       >189 mg/dl         This screening LDL is a calculated result. It does not have the accuracy of the Direct Measured LDL in the monitoring of patients with hyperlipidemia and/or statin therapy. Direct Measure LDL (STX860) must be ordered separately in these patients.     Non-HDL-Chol (CHOL-HDL) 11/14/2022 113  mg/dl Final

## 2023-10-16 NOTE — PATIENT INSTRUCTIONS
Double your daily Vit. D intake  Continue leflunomide 20mg daily  Continue meloxicam daily as needed for joint pain  Continue diclofenac gel as needed for joint pain  Will switch from Skyrizi to Tremfya injections at weeks 0, 4, then every 8 weeks  Do labs today    Return to clinic in 6 months    Psoriatic Arthritis in Adults    What is psoriatic arthritis? -- Psoriatic arthritis is a condition that causes joint pain, swelling, and stiffness. It happens in people who have a long-term skin condition called psoriasis. People with psoriasis have patches of thick, red skin that are often covered by silver or white scales  Doctors don't know what causes psoriasis or psoriatic arthritis. What are the symptoms of psoriatic arthritis? -- Psoriatic arthritis causes pain, stiffness, and swelling in the affected joints. It can also affect the spine in some people. Because of the joint and spine problems, people can have trouble moving their body. Stiffness in the joints or low back is usually worse in the morning and lasts 30 minutes or longer. It usually gets better with exercise. Psoriatic arthritis can affect joints on one or both sides of the body. It usually affects more than one joint. In addition to joint symptoms (and the skin symptoms of psoriasis), people sometimes have other symptoms. These can include:  ? Swelling of a finger or toe, or the hands or feet  ? Swelling and pain in the back of the ankle or in the heel   ? Nail symptoms - The nails can look "pitted," as if they were pricked by a pin. The nail can also come up off the nail bed. ? Eye pain or redness  Is there a test for psoriatic arthritis? -- Yes. Your doctor or nurse will ask about your symptoms and do an exam. He or she will order X-rays of your painful joints. He or she might order an imaging test called an MRI. Imaging tests create pictures of the inside of the body.   To check that another condition isn't causing your symptoms, your doctor or nurse might also order:  ?Blood tests  ? Lab tests on a sample of fluid from a swollen joint - To get a sample of fluid, the doctor will put a thin needle in your joint. How is psoriatic arthritis treated? -- There is no cure for psoriatic arthritis, but different treatments can help ease and control symptoms. Treatment for joint symptoms usually involves one or more of the following:  ?Medicines called nonsteroidal antiinflammatory drugs, or "NSAIDs" for short - Examples of NSAIDs are aspirin, ibuprofen (sample brand names: Advil, Motrin), and naproxen (sample brand name: Aleve). ? Medicines that are usually used to treat other types of arthritis - Some of these include methotrexate and leflunomide. ? Medicines that block a substance called tumor necrosis factor, or "TNF" for short - TNF plays a role in psoriasis and psoriatic arthritis. Medicines that block TNF are called "anti-TNF" medicines. Examples include etanercept (brand name: Enbrel) and adalimumab (brand name: Humira). ?Other medicines - If the options above don't help, your doctor might suggest trying a different medicine. Examples include ustekinumab (brand name: Mono Holt), secukinumab (brand name: Cosentyx), tofacitinib (brand name: Manjinder Capes), abatacept (brand name: Wilfrid Echavarria), and apremilast (brand name: Mahi Miranda). ? Shots of medicines called steroids that go into the painful joint - These are not the same as the steroids some athletes take illegally. These steroids help reduce swelling and pain. ? Heat - Heat, especially in the morning, can help reduce pain and stiffness. Do not use heat for longer than 20 minutes at a time. Also, do not use anything too hot that could burn your skin. ? Physical and occupational therapy - This involves learning exercises, movements, and ways of doing everyday tasks. ? Special shoe inserts (called "orthotics") - These can help keep your feet, ankles, and knees in the proper position.   ?Treatment for psoriatic arthritis is usually long term. That's because even after symptoms get better, they sometimes return later on. Is there anything I can do on my own to feel better? -- Yes. It is very important that you stay active. You might want to avoid being active because you are in pain. But this can make things worse. It can make your muscles weak and your joints stiffer than they already are. Your doctor, nurse, or physical therapist can help you figure out which activities and exercises are right for you.

## 2023-10-16 NOTE — TELEPHONE ENCOUNTER
Please obtain prior authorization for Tremfya (guselkumab) 100mg subcutaneous pen injection at weeks 0, 4, then every 8  weeks for treatment of her uncontrolled psoriasis and psoriatic arthritis. Is currently failing Skyrizi, effects are not lasting full 12 weeks. Is also on leflunomide 20mg daily. Has tried and failed or was intolerant to Taltz, methotrexate, Humira, hydrochloroquine, sulfasalazine, and Rinvoq. She has recent TB test and viral hepatitis panel on file, no active infections, and is up to date on her immunizations.

## 2023-10-17 DIAGNOSIS — L40.50 PSORIATIC ARTHRITIS (HCC): Primary | ICD-10-CM

## 2023-10-17 LAB
GAMMA INTERFERON BACKGROUND BLD IA-ACNC: <0 IU/ML
M TB IFN-G BLD-IMP: NEGATIVE
M TB IFN-G CD4+ BCKGRND COR BLD-ACNC: 0 IU/ML
M TB IFN-G CD4+ BCKGRND COR BLD-ACNC: 0 IU/ML
MITOGEN IGNF BCKGRD COR BLD-ACNC: 7.87 IU/ML

## 2023-11-22 ENCOUNTER — TELEPHONE (OUTPATIENT)
Dept: OBGYN CLINIC | Facility: HOSPITAL | Age: 58
End: 2023-11-22

## 2023-11-22 NOTE — TELEPHONE ENCOUNTER
Caller: [facility]    Doctor: Serge Orellana    Reason for call: Specialty pharmacy calling to make us aware that they are unable to contact the patient for delivery of her Tremfya.     Call back#: [phone]

## 2023-11-27 ENCOUNTER — TELEPHONE (OUTPATIENT)
Dept: RHEUMATOLOGY | Facility: CLINIC | Age: 58
End: 2023-11-27

## 2023-11-27 NOTE — TELEPHONE ENCOUNTER
Left patient a message in regard to calling [facility] in regards to getting the medication delivered.

## 2023-11-28 NOTE — TELEPHONE ENCOUNTER
Caller: [facility]     Doctor: Reny Pierce     Reason for call: Patient has been non compliant to schedule delivery of Tremfya. They will be discontinuing the script since they have not been able to reach the patient after several attempts.      Call back#: [phone]

## 2023-11-28 NOTE — TELEPHONE ENCOUNTER
Please try to call and reach out to patient and let her know that [facility] has been trying to reach out to her regarding Tremfya delivery. She should call them back.

## 2023-11-28 NOTE — TELEPHONE ENCOUNTER
Left message for pt letting her know that [facility] has been trying to reach out to her regarding Tremfya delivery and to contact them.

## 2023-12-03 NOTE — TELEPHONE ENCOUNTER
That's fine, you can try calling patient one more time to let her know the situation. Otherwise, she has to do without her medication since she is not setting up delivery through the specialty pharmacy. Will be following up with her in April.

## 2024-02-21 PROBLEM — J01.90 ACUTE NON-RECURRENT SINUSITIS: Status: RESOLVED | Noted: 2019-10-01 | Resolved: 2024-02-21

## 2024-06-30 NOTE — RESULT NOTES
Consult received and chart reviewed. Ms. Fuentes is admitted with left sided empyema and will require surgical decortication. She has elected to transfer to [facility] for evaluation and management and arrangements are being made. Please call if we can be of assistance prior to her transfer.    Verified Results  * NM BONE SCAN 3 PHASE [id number] 10:50AM Nacho Bourne     Test Name Result Flag Reference   NM BONE SCAN 3 PHASE (Report)     THREE PHASE BONE SCAN OF THE KNEES     INDICATION: Left knee pain, swelling     PREVIOUS FILM CORRELATION:  This study is correlated with left knee MRI 9/14/2016 and priors     RADIOPHARMACEUTICAL 25 3 mCi Tc-99m MDP IV     TECHNIQUE: Perfusion images of the knees were acquired in the anterior and posterior projection  Blood pool and 2-3 hour delayed images were acquired of the knees in multiple projections  FINDINGS:      PERFUSION:  Increased perfusion to the left knee  BLOOD POOL: Early osseous uptake in the left knee  DELAYED: Nonspecific increased radiotracer activity in the left femoral condyles and along the tibial plateau, as well as patella  This may be due to underlying infection/inflammation  Minimal right knee degenerative change  IMPRESSION:     1  Left knee hyperperfusion with persistent activity involving the left femoral condyles, along the tibial plateau, and patella  This may be due to underlying infection/inflammation  Clinical correlation is recommended  Workstation performed: [id number]     Signed by:   Ashley Barrera MD   10/18/16       Plan  Left knee pain    · (1) CBC/ PLT (NO DIFF); Status:Active; Requested for:18Oct2016;    · (1) C-REACTIVE PROTEIN; Status:Active; Requested for:18Oct2016;    · (1) SED RATE; Status:Active;  Requested for:18Oct2016;

## 2024-12-04 ENCOUNTER — TELEPHONE (OUTPATIENT)
Age: 59
End: 2024-12-04

## 2024-12-04 NOTE — TELEPHONE ENCOUNTER
Pt calling to schedule a f/u apt with Dr. Fernandes. Pt last seen 10/223, she would like for Dr. Fernandes to know she has not been back due to her having a lap band removal surgery, chest tube insertion, and then shortly after chronic covid/pneumonia. Due to her conditions [facility] had to take her off her arthritis medications, and she has been suffering from swollen hands/feet    Scheduled first urgent f/u 12/10 at 3pm with Dr. Fernandes in [place]

## 2024-12-10 ENCOUNTER — OFFICE VISIT (OUTPATIENT)
Dept: RHEUMATOLOGY | Facility: CLINIC | Age: 59
End: 2024-12-10
Payer: COMMERCIAL

## 2024-12-10 VITALS
DIASTOLIC BLOOD PRESSURE: 70 MMHG | SYSTOLIC BLOOD PRESSURE: 122 MMHG | BODY MASS INDEX: 35.06 KG/M2 | HEART RATE: 96 BPM | HEIGHT: 60 IN | WEIGHT: 178.6 LBS | OXYGEN SATURATION: 96 %

## 2024-12-10 DIAGNOSIS — L40.50 PSORIATIC ARTHRITIS (HCC): Primary | ICD-10-CM

## 2024-12-10 DIAGNOSIS — R79.89 LOW VITAMIN D LEVEL: ICD-10-CM

## 2024-12-10 DIAGNOSIS — Z79.899 HIGH RISK MEDICATION USE: ICD-10-CM

## 2024-12-10 DIAGNOSIS — L40.9 PSORIASIS: ICD-10-CM

## 2024-12-10 DIAGNOSIS — M17.0 PRIMARY OSTEOARTHRITIS OF BOTH KNEES: ICD-10-CM

## 2024-12-10 PROCEDURE — 99215 OFFICE O/P EST HI 40 MIN: CPT | Performed by: INTERNAL MEDICINE

## 2024-12-10 RX ORDER — HYDROCORTISONE 25 MG/G
OINTMENT TOPICAL
COMMUNITY
Start: 2024-11-18

## 2024-12-10 RX ORDER — CYCLOBENZAPRINE HCL 10 MG
TABLET ORAL
COMMUNITY
Start: 2024-09-20

## 2024-12-10 RX ORDER — PANTOPRAZOLE SODIUM 20 MG/1
1 TABLET, DELAYED RELEASE ORAL DAILY
COMMUNITY
Start: 2024-11-19

## 2024-12-10 RX ORDER — METHOCARBAMOL 750 MG/1
TABLET, FILM COATED ORAL
COMMUNITY
Start: 2024-10-13

## 2024-12-10 RX ORDER — SEMAGLUTIDE 1 MG/.5ML
INJECTION, SOLUTION SUBCUTANEOUS
COMMUNITY

## 2024-12-10 RX ORDER — METHYLPREDNISOLONE 4 MG/1
TABLET ORAL
COMMUNITY
Start: 2024-09-11 | End: 2024-12-10 | Stop reason: ALTCHOICE

## 2024-12-10 RX ORDER — PREDNISONE 50 MG/1
TABLET ORAL
COMMUNITY
Start: 2024-12-06

## 2024-12-10 RX ORDER — TRIAMCINOLONE ACETONIDE 40 MG/ML
80 INJECTION, SUSPENSION INTRA-ARTICULAR; INTRAMUSCULAR ONCE
Status: COMPLETED | OUTPATIENT
Start: 2024-12-10 | End: 2024-12-10

## 2024-12-10 RX ORDER — MELOXICAM 15 MG/1
15 TABLET ORAL DAILY
Qty: 90 TABLET | Refills: 2 | Status: SHIPPED | OUTPATIENT
Start: 2024-12-10 | End: 2024-12-20 | Stop reason: ALTCHOICE

## 2024-12-10 RX ADMIN — TRIAMCINOLONE ACETONIDE 80 MG: 40 INJECTION, SUSPENSION INTRA-ARTICULAR; INTRAMUSCULAR at 16:00

## 2024-12-10 NOTE — PATIENT INSTRUCTIONS
Take daily Vit. D intake  Can take meloxicam daily as needed for joint pain  Continue diclofenac gel as needed for joint pain  Restart Tremfya every 8 week pen injections  Do labs as soon as possible  Right arm steroid injection given in clinic today     Return to clinic in 6 months

## 2024-12-10 NOTE — PROGRESS NOTES
Name: Elham Self      : 1965      MRN: [id number]  Encounter Provider: Doni Fernandes MD  Encounter Date: 12/10/2024   Encounter department: [facility] RHEUMATOLOGY [facility]  :  Assessment & Plan  Psoriatic arthritis (HCC)  She is experiencing a flare-up of psoriatic arthritis, with symptoms including dactylitis of the left third finger, right first MCP, right fifth PIP, and right big toe. Her disease is progressing off medication. She reports significant pain and swelling, particularly in her knees and left toe. She has been off her arthritis medication for a year due to previous infections and is currently on a short course of prednisone. A steroid injection will be administered today to help manage the flare-up. She will be restarted on Tremfya injections every 8 weeks, which actually has a low infection risk profile. Will not restart leflunomide since it has a worse infection risk profile and her recent history of multiple infections. She is advised to take meloxicam daily as needed for joint pain and to continue using diclofenac gel as needed. Laboratory tests, including an updated TB test and hepatitis panel, will be conducted as soon as possible. Patient's rheumatologic disease(s) threaten long-term function if not appropriately managed.    Can take meloxicam daily as needed for joint pain  Continue diclofenac gel as needed for joint pain  Restart Tremfya every 8 week pen injections  Do labs as soon as possible  Right arm steroid injection given in clinic today    Orders:  •  CBC and differential  •  Comprehensive metabolic panel  •  C-reactive protein  •  Sedimentation rate, automated  •  triamcinolone acetonide (KENALOG-40) 40 mg/mL injection 80 mg    Psoriasis         Low vitamin D level    Orders:  •  Vitamin D 25 hydroxy    Primary osteoarthritis of both knees    Orders:  •  Diclofenac Sodium (VOLTAREN) 1 %; Apply 2 g topically 4 (four) times a day  •  meloxicam (MOBIC) 15  mg tablet; Take 1 tablet (15 mg total) by mouth daily    High risk medication use  Benefits and risks of Tremfya discussed, and include but are not limited to leukopenia, reactivation of hepatitis B/C or TB, infusion or site reactions, and increased risk of infections. A baseline CBC, CMP, Hepatitis B/C status, and TB test will be checked. CBC, CMP will be regularly monitored.   Orders:  •  Quantiferon TB Gold Plus Assay  •  Chronic Hepatitis Panel      Assessment & Plan  Assessment & Plan  1. Psoriatic arthritis.  She is experiencing a flare-up of psoriatic arthritis, with symptoms including dactylitis of the left third finger, right first MCP, right fifth PIP, and right big toe. She reports significant pain and swelling, particularly in her knees and left toe. She has been off her arthritis medication for a year due to previous infections and is currently on a short course of prednisone. A steroid injection will be administered today to help manage the flare-up. She will be restarted on Tremfya injections every 8 weeks. She is advised to take meloxicam daily as needed for joint pain and to continue using diclofenac gel as needed. Laboratory tests, including an updated TB test and hepatitis panel, will be conducted as soon as possible.    2. Empyema and lung complications.  She has a history of empyema, double pneumonia, and COVID-19, which required hospitalization and a chest tube. She continues to experience pain in her left lung when taking a deep breath. A recent fluoroscopic diaphragm sniff test showed normal results. She is advised to follow up with her pulmonologist to address ongoing symptoms and to ensure there is no nerve damage or other underlying issues.    3. Medication management.  She is currently on Wellbutrin twice a day, asthma medication, and Wegovy for weight loss. She has been taken off her blood pressure medication as her readings are stable. She is advised to continue her current medications  and to ensure they do not interfere with her arthritis treatment.    Follow-up  The patient will follow up in 6 to 9 months.      Take daily Vit. D intake  Can take meloxicam daily as needed for joint pain  Continue diclofenac gel as needed for joint pain  Restart Tremfya every 8 week pen injections  Do labs as soon as possible  Right arm steroid injection given in clinic today     Return to clinic in 6 months        Pertinent Medical History        History of Present Illness   Elham Self is a 58 y.o. female who presents for follow-up of her psoriatic arthritis.    History of Present Illness  History of Present Illness  The patient is a 58-year-old female who presents for a follow-up visit.    She has been experiencing significant health issues, including a lap band that was three-quarters embedded in her stomach, necessitating major surgery in 11/2023. She was hospitalized from 12/2023 to 01/2024 due to empyema of the lungs, double pneumonia, and COVID-19, during which she required a chest tube. Her arthritis medication was discontinued due to potential interference with her recovery. A recent sniff test revealed bleeding, and she continues to experience pain in her left lung upon deep inhalation. She occasionally experiences difficulty breathing, similar to Tourette's syndrome. She is currently not on any antibiotics and has not been ill since 12/2023. She has no history of gout and does not believe her current flare-up is related to dietary changes. She has reduced her food intake. She is not currently taking vitamin D or leflunomide. She is not taking meloxicam but uses Voltaren gel, which provides minimal relief. She has attempted to manage her symptoms with turmeric.    She reports swelling in her knees and foot, which she attributes to a flare-up of her arthritis due to discontinuation of her medication for the past year. She has been unable to move her legs or bend her knees at night for the past few  weeks. Her family doctor recommended a return to Dr. Wills for treatment. She was prescribed prednisone, which she dislikes due to associated weight gain, but it has provided some relief. She is on day 2 of a 5-day course of 50 mg prednisone. She also reports a flare-up of her psoriasis and swelling in her left toe.    She has been taken off her blood pressure medication as her condition has improved. She is currently on Wegovy for weight loss, asthma medication, Wellbutrin twice daily, and gels for skin rashes prescribed by her family doctor.    MEDICATIONS  Current: Wegovy, Wellbutrin, prednisone, asthma medicine, diclofenac gel  Discontinued: Tremfya, Skyrizi, leflunomide    PROCEDURE  The patient underwent major surgery in 11/2023 to remove a lap band that was three-quarters embedded in her stomach. During her hospitalization from 12/2023 to 01/2024, a chest tube was inserted due to empyema of the lungs, double pneumonia, and COVID-19.     Review of Systems  See HPI      Objective   /70   Pulse 96   Ht 5' (1.524 m)   Wt 81 kg (178 lb 9.6 oz)   SpO2 96%   BMI 34.88 kg/m²     Physical Exam  Constitutional:    General: He is not in acute distress.  HENT:   Head: Normocephalic and atraumatic.   Eyes:   Conjunctiva/sclera: Conjunctivae normal.   Cardiovascular:   Rate and Rhythm: Normal rate and regular rhythm.   Heart sounds: S1 normal and S2 normal.   No friction rub.   Pulmonary:   Effort: Pulmonary effort is normal. No respiratory distress.   Breath sounds: Normal breath sounds. No wheezing, rhonchi or rales.   Musculoskeletal: dactylitis of the left third finger, right first MCP, right fifth PIP, and right big toe  Cervical back: Neck supple.   Skin:  Coloration: Skin is not pale.   Neurological:   Mental Status: He is alert. Mental status is at baseline.   Psychiatric:      Mood and Affect: Mood normal.      Behavior: Behavior normal.    Physical Exam    Results  Results  Imaging  Fluoroscopic  diaphragm sniff test shows normal results with symmetric motion.    Recent labs:  Lab Results   Component Value Date/Time    SODIUM 144 06/12/2024 08:39 AM    K 4.3 06/12/2024 08:39 AM    BUN 17 06/12/2024 08:39 AM    CREATININE 0.80 06/12/2024 08:39 AM    GLUC 81 06/12/2024 08:39 AM    CALCIUM 9.2 06/12/2024 08:39 AM    AST 13 06/12/2024 08:39 AM    ALT 11 06/12/2024 08:39 AM    ALB 3.9 06/12/2024 08:39 AM    TP 6.7 06/12/2024 08:39 AM    EGFR 85 06/12/2024 08:39 AM    EGFR 96 11/11/2020 08:57 AM     Lab Results   Component Value Date/Time    HGB 13.7 10/16/2023 12:05 PM    HGB 10.4 (L) 05/04/2021 03:25 AM    WBC 7.23 10/16/2023 12:05 PM     10/16/2023 12:05 PM    INR 0.9 04/22/2021 02:54 PM    PTT 36 (H) 05/04/2018 01:02 PM    PTT 38 (H) 05/04/2018 01:02 PM     Lab Results   Component Value Date/Time    RAM4ZRQEGKER 2.340 08/31/2019 07:21 AM    WYV2TAZRWLRG 0.803 08/14/2015 05:07 PM

## 2024-12-16 ENCOUNTER — TELEPHONE (OUTPATIENT)
Dept: RHEUMATOLOGY | Facility: CLINIC | Age: 59
End: 2024-12-16

## 2024-12-16 ENCOUNTER — TELEPHONE (OUTPATIENT)
Age: 59
End: 2024-12-16

## 2024-12-16 DIAGNOSIS — L40.50 PSORIATIC ARTHRITIS (HCC): ICD-10-CM

## 2024-12-16 PROBLEM — L40.9 PSORIASIS: Status: ACTIVE | Noted: 2024-12-16

## 2024-12-16 PROBLEM — Z79.899 HIGH RISK MEDICATION USE: Status: ACTIVE | Noted: 2024-12-16

## 2024-12-16 NOTE — TELEPHONE ENCOUNTER
Rheumatology Prior Authorization Request      Medication/Disease Information:   Diagnosis:  psoriatic arthritis, psoriasis  Medication:  Tremfya (guselkumab) 100mg subcutaneous pen injection every 8 weeks, continuation of therapy  Failed or Intolerant to or Contraindicated:  Taltz, methotrexate, Humira, hydrochloroquine, sulfasalazine, Rinvoq, and leflunomide   Screening  Hepatitis B/C:  negative  Tuberculosis: negative  No active infections  Up to Date on immunizations    [facility]

## 2024-12-16 NOTE — TELEPHONE ENCOUNTER
PA for Tremfya 100mg auto injector SUBMITTED to [email or web address]     via    []CMM-KEY:   []Surescripts-Case ID #   []Availity-Auth ID # NDC #   [x]Faxed to plan - scanned into chart.  []Other website   []Phone call Case ID #     [x]PA sent as URGENT    All office notes, labs and other pertaining documents and studies sent. Clinical questions answered. Awaiting determination from insurance company.     Turnaround time for your insurance to make a decision on your Prior Authorization can take 7-21 business days.

## 2024-12-16 NOTE — ASSESSMENT & PLAN NOTE
Benefits and risks of Tremfya discussed, and include but are not limited to leukopenia, reactivation of hepatitis B/C or TB, infusion or site reactions, and increased risk of infections. A baseline CBC, CMP, Hepatitis B/C status, and TB test will be checked. CBC, CMP will be regularly monitored.   Orders:  •  Quantiferon TB Gold Plus Assay  •  Chronic Hepatitis Panel

## 2024-12-17 NOTE — TELEPHONE ENCOUNTER
PA for Tremfya 100mg auto injector  APPROVED     Date(s) approved 12/16/2024 - 12/16/2025      Patient advised by          []MyChart Message  [x]Phone call   []LMOM  []L/M to call office as no active Communication consent on file  []Unable to leave detailed message as VM not approved on Communication consent       Pharmacy advised by    [x]Fax  []Phone call    Approval letter scanned into [employer] Yes

## 2024-12-20 DIAGNOSIS — L40.50 PSORIATIC ARTHRITIS (HCC): Primary | ICD-10-CM

## 2024-12-20 DIAGNOSIS — M17.0 PRIMARY OSTEOARTHRITIS OF BOTH KNEES: ICD-10-CM

## 2024-12-20 RX ORDER — CELECOXIB 200 MG/1
200 CAPSULE ORAL 2 TIMES DAILY
Qty: 60 CAPSULE | Refills: 6 | Status: SHIPPED | OUTPATIENT
Start: 2024-12-20

## 2024-12-20 NOTE — TELEPHONE ENCOUNTER
Pt states that he Meloxicam doesn't help with the pain management and wants to know if there is something else that you can prescribe her and send to [facility] on file.       Also calling on labs she had done on 12/16 at [facility]. Asking if there is anything to be concerned about.      Please advise

## 2024-12-20 NOTE — TELEPHONE ENCOUNTER
Of note, Tremfya script has already been sent to [facility], and she should follow-up with them regarding delivery

## 2024-12-20 NOTE — TELEPHONE ENCOUNTER
Spoke with pt and made her aware that Celebrex was sent to the [facility] and that the Tremfya has been approved to reach out to the specialty pharmacy to set up delivery

## 2024-12-20 NOTE — TELEPHONE ENCOUNTER
Please let her know that I confirm that they have the paperwork, and are still processing it, could take at least a week

## 2024-12-20 NOTE — TELEPHONE ENCOUNTER
Sending celecoxib to her pharmacy, the labs showed elevated inflammation which is understandable since she has been off her Tremfya, which has now been approved, and she should call [facility] to arrange delivery

## 2025-01-09 ENCOUNTER — TRANSCRIBE ORDERS (OUTPATIENT)
Age: 60
End: 2025-01-09

## (undated) DEVICE — SINGLE-USE BIOPSY FORCEPS: Brand: RADIAL JAW 4